# Patient Record
Sex: MALE | Race: WHITE | NOT HISPANIC OR LATINO | Employment: FULL TIME | URBAN - METROPOLITAN AREA
[De-identification: names, ages, dates, MRNs, and addresses within clinical notes are randomized per-mention and may not be internally consistent; named-entity substitution may affect disease eponyms.]

---

## 2020-04-24 ENCOUNTER — TRANSCRIBE ORDERS (OUTPATIENT)
Dept: CARDIOLOGY | Facility: CLINIC | Age: 45
End: 2020-04-24

## 2020-04-24 ENCOUNTER — OFFICE VISIT (OUTPATIENT)
Dept: CARDIOLOGY | Facility: CLINIC | Age: 45
End: 2020-04-24

## 2020-04-24 ENCOUNTER — HOSPITAL ENCOUNTER (OUTPATIENT)
Dept: CARDIOLOGY | Facility: HOSPITAL | Age: 45
Discharge: HOME | End: 2020-04-24
Admitting: INTERNAL MEDICINE

## 2020-04-24 VITALS
HEART RATE: 98 BPM | BODY MASS INDEX: 39.56 KG/M2 | DIASTOLIC BLOOD PRESSURE: 88 MMHG | WEIGHT: 282.6 LBS | SYSTOLIC BLOOD PRESSURE: 158 MMHG | HEIGHT: 71 IN | OXYGEN SATURATION: 98 %

## 2020-04-24 DIAGNOSIS — R06.02 SHORTNESS OF BREATH: ICD-10-CM

## 2020-04-24 DIAGNOSIS — Z13.6 SCREENING FOR CARDIOVASCULAR CONDITION: Primary | ICD-10-CM

## 2020-04-24 DIAGNOSIS — R07.2 PRECORDIAL PAIN: Primary | ICD-10-CM

## 2020-04-24 DIAGNOSIS — Z01.810 PREPROCEDURAL CARDIOVASCULAR EXAMINATION: ICD-10-CM

## 2020-04-24 DIAGNOSIS — R07.2 PRECORDIAL PAIN: ICD-10-CM

## 2020-04-24 LAB
AORTIC ROOT ANNULUS: 2.2 CM
ASCENDING AORTA: 3.3 CM
BH CV ECHO MEAS - ACS: 1.9 CM
BH CV ECHO MEAS - AO ROOT AREA (BSA CORRECTED): 1.2
BH CV ECHO MEAS - AO ROOT AREA: 6.7 CM^2
BH CV ECHO MEAS - AO ROOT DIAM: 2.9 CM
BH CV ECHO MEAS - ASC AORTA: 3.3 CM
BH CV ECHO MEAS - BSA(HAYCOCK): 2.6 M^2
BH CV ECHO MEAS - BSA: 2.4 M^2
BH CV ECHO MEAS - BZI_BMI: 40.2 KILOGRAMS/M^2
BH CV ECHO MEAS - BZI_METRIC_HEIGHT: 177.8 CM
BH CV ECHO MEAS - BZI_METRIC_WEIGHT: 127 KG
BH CV ECHO MEAS - EDV(MOD-SP2): 87 ML
BH CV ECHO MEAS - EDV(MOD-SP4): 94 ML
BH CV ECHO MEAS - EDV(TEICH): 65.5 ML
BH CV ECHO MEAS - EF(CUBED): 60.3 %
BH CV ECHO MEAS - EF(MOD-BP): 62 %
BH CV ECHO MEAS - EF(MOD-SP2): 66.7 %
BH CV ECHO MEAS - EF(MOD-SP4): 55.3 %
BH CV ECHO MEAS - EF(TEICH): 52.5 %
BH CV ECHO MEAS - ESV(MOD-SP2): 29 ML
BH CV ECHO MEAS - ESV(MOD-SP4): 42 ML
BH CV ECHO MEAS - ESV(TEICH): 31.1 ML
BH CV ECHO MEAS - FS: 26.5 %
BH CV ECHO MEAS - IVS/LVPW: 0.94
BH CV ECHO MEAS - IVSD: 1.1 CM
BH CV ECHO MEAS - LAT PEAK E' VEL: 10 CM/SEC
BH CV ECHO MEAS - LV DIASTOLIC VOL/BSA (35-75): 39 ML/M^2
BH CV ECHO MEAS - LV MASS(C)D: 152.6 GRAMS
BH CV ECHO MEAS - LV MASS(C)DI: 63.4 GRAMS/M^2
BH CV ECHO MEAS - LV MAX PG: 13.4 MMHG
BH CV ECHO MEAS - LV SYSTOLIC VOL/BSA (12-30): 17.4 ML/M^2
BH CV ECHO MEAS - LV V1 MAX: 183.2 CM/SEC
BH CV ECHO MEAS - LVIDD: 3.9 CM
BH CV ECHO MEAS - LVIDS: 2.9 CM
BH CV ECHO MEAS - LVLD AP2: 8.2 CM
BH CV ECHO MEAS - LVLD AP4: 8.4 CM
BH CV ECHO MEAS - LVLS AP2: 7.1 CM
BH CV ECHO MEAS - LVLS AP4: 7.3 CM
BH CV ECHO MEAS - LVPWD: 1.2 CM
BH CV ECHO MEAS - MED PEAK E' VEL: 8 CM/SEC
BH CV ECHO MEAS - MV A DUR: 0.1 SEC
BH CV ECHO MEAS - MV A MAX VEL: 64.9 CM/SEC
BH CV ECHO MEAS - MV DEC SLOPE: 680.8 CM/SEC^2
BH CV ECHO MEAS - MV DEC TIME: 0.14 SEC
BH CV ECHO MEAS - MV E MAX VEL: 101 CM/SEC
BH CV ECHO MEAS - MV E/A: 1.6
BH CV ECHO MEAS - MV P1/2T MAX VEL: 90.2 CM/SEC
BH CV ECHO MEAS - MV P1/2T: 38.8 MSEC
BH CV ECHO MEAS - MVA P1/2T LCG: 2.4 CM^2
BH CV ECHO MEAS - MVA(P1/2T): 5.7 CM^2
BH CV ECHO MEAS - PULM A REVS DUR: 0.1 SEC
BH CV ECHO MEAS - PULM A REVS VEL: 26.2 CM/SEC
BH CV ECHO MEAS - PULM DIAS VEL: 64.3 CM/SEC
BH CV ECHO MEAS - PULM S/D: 0.9
BH CV ECHO MEAS - PULM SYS VEL: 58.1 CM/SEC
BH CV ECHO MEAS - RAP SYSTOLE: 3 MMHG
BH CV ECHO MEAS - RVSP: 24.8 MMHG
BH CV ECHO MEAS - SI(CUBED): 14.7 ML/M^2
BH CV ECHO MEAS - SI(MOD-SP2): 24.1 ML/M^2
BH CV ECHO MEAS - SI(MOD-SP4): 21.6 ML/M^2
BH CV ECHO MEAS - SI(TEICH): 14.3 ML/M^2
BH CV ECHO MEAS - SV(CUBED): 35.5 ML
BH CV ECHO MEAS - SV(MOD-SP2): 58 ML
BH CV ECHO MEAS - SV(MOD-SP4): 52 ML
BH CV ECHO MEAS - SV(TEICH): 34.4 ML
BH CV ECHO MEAS - TAPSE (>1.6): 2.5 CM2
BH CV ECHO MEAS - TR MAX VEL: 233.5 CM/SEC
BH CV ECHO MEASUREMENTS AVERAGE E/E' RATIO: 11.22
BH CV STRESS BP STAGE 1: NORMAL
BH CV STRESS BP STAGE 2: NORMAL
BH CV STRESS BP STAGE 3: NORMAL
BH CV STRESS DURATION MIN STAGE 1: 3
BH CV STRESS DURATION MIN STAGE 2: 3
BH CV STRESS DURATION MIN STAGE 3: 3
BH CV STRESS DURATION SEC STAGE 1: 0
BH CV STRESS DURATION SEC STAGE 2: 0
BH CV STRESS DURATION SEC STAGE 3: 0
BH CV STRESS ECHO POST STRESS EJECTION FRACTION EF: 74 %
BH CV STRESS GRADE STAGE 1: 10
BH CV STRESS GRADE STAGE 2: 12
BH CV STRESS GRADE STAGE 3: 14
BH CV STRESS HR STAGE 1: 122
BH CV STRESS HR STAGE 2: 145
BH CV STRESS HR STAGE 3: 167
BH CV STRESS METS STAGE 1: 5
BH CV STRESS METS STAGE 2: 7.5
BH CV STRESS METS STAGE 3: 10
BH CV STRESS PROTOCOL 1: NORMAL
BH CV STRESS SPEED STAGE 1: 1.7
BH CV STRESS SPEED STAGE 2: 2.5
BH CV STRESS SPEED STAGE 3: 3.4
BH CV STRESS STAGE 1: 1
BH CV STRESS STAGE 2: 2
BH CV STRESS STAGE 3: 3
BH CV VAS BP RIGHT ARM: NORMAL MMHG
BH CV XLRA - RV BASE: 2.8 CM
BH CV XLRA - RV LENGTH: 7.9 CM
BH CV XLRA - RV MID: 2.8 CM
BH CV XLRA - TDI S': 13 CM/SEC
LEFT ATRIUM VOLUME INDEX: 20 ML/M2
MAXIMAL PREDICTED HEART RATE: 177 BPM
PERCENT MAX PREDICTED HR: 94.35 %
SINUS: 3 CM
STJ: 2.9 CM
STRESS BASELINE BP: NORMAL MMHG
STRESS BASELINE HR: 85 BPM
STRESS O2 SAT REST: 98 %
STRESS PERCENT HR: 111 %
STRESS POST ESTIMATED WORKLOAD: 10 METS
STRESS POST EXERCISE DUR MIN: 9 MIN
STRESS POST EXERCISE DUR SEC: 0 SEC
STRESS POST PEAK BP: NORMAL MMHG
STRESS POST PEAK HR: 167 BPM
STRESS TARGET HR: 150 BPM

## 2020-04-24 PROCEDURE — 99244 OFF/OP CNSLTJ NEW/EST MOD 40: CPT | Performed by: INTERNAL MEDICINE

## 2020-04-24 PROCEDURE — 93325 DOPPLER ECHO COLOR FLOW MAPG: CPT

## 2020-04-24 PROCEDURE — 93320 DOPPLER ECHO COMPLETE: CPT

## 2020-04-24 PROCEDURE — 93320 DOPPLER ECHO COMPLETE: CPT | Performed by: INTERNAL MEDICINE

## 2020-04-24 PROCEDURE — 25010000002 PERFLUTREN (DEFINITY) 8.476 MG IN SODIUM CHLORIDE (PF) 0.9 % 10 ML INJECTION: Performed by: INTERNAL MEDICINE

## 2020-04-24 PROCEDURE — 93350 STRESS TTE ONLY: CPT

## 2020-04-24 PROCEDURE — 93350 STRESS TTE ONLY: CPT | Performed by: INTERNAL MEDICINE

## 2020-04-24 PROCEDURE — 93017 CV STRESS TEST TRACING ONLY: CPT

## 2020-04-24 PROCEDURE — 93325 DOPPLER ECHO COLOR FLOW MAPG: CPT | Performed by: INTERNAL MEDICINE

## 2020-04-24 PROCEDURE — 93016 CV STRESS TEST SUPVJ ONLY: CPT | Performed by: INTERNAL MEDICINE

## 2020-04-24 PROCEDURE — 93018 CV STRESS TEST I&R ONLY: CPT | Performed by: INTERNAL MEDICINE

## 2020-04-24 PROCEDURE — 93352 ADMIN ECG CONTRAST AGENT: CPT | Performed by: INTERNAL MEDICINE

## 2020-04-24 RX ADMIN — PERFLUTREN 3 ML: 6.52 INJECTION, SUSPENSION INTRAVENOUS at 14:38

## 2020-04-24 NOTE — H&P (VIEW-ONLY)
Subjective:     Encounter Date:04/24/2020      Patient ID: Blaek Dennis is a 45 y.o. male.    Dear Herminia thank you for referring this patient for consultation of recurrent exertional chest pressure and shortness of breath.  Chief Complaint:  Shortness of Breath   This is a new problem. The current episode started 1 to 4 weeks ago. The problem has been gradually worsening. Associated symptoms include chest pain.   Chest Pain    Associated symptoms include shortness of breath.       45-year-old gentleman who presents today for evaluation.  He said back in March she started having an upper respiratory infection.  He said he had a cough chest pain fever.  He has had a persistent with a sore throat for about a month.  The cough ultimately resolved but he continues to still have chest pain as well as shortness of breath with exertion.  He said he had an episode at work where he started having symptoms.  He went and had his blood pressure checked which was 160/112 he was not sure if that was correct because it was so unusually high so he went to his primary care physician and they got 130/80.  He continues to have episodes of exertional shortness of breath and tightness in his chest.  With that he was seen today for further evaluation.    Review of Systems   Cardiovascular: Positive for chest pain.   Respiratory: Positive for shortness of breath and snoring.    Musculoskeletal: Positive for joint pain and joint swelling.   All other systems reviewed and are negative.      Procedures       Objective:     Physical Exam   Constitutional: He is oriented to person, place, and time. He appears well-developed.   HENT:   Head: Normocephalic.   Eyes: Conjunctivae are normal.   Neck: Normal range of motion.   Cardiovascular: Normal rate, regular rhythm and normal heart sounds.   Pulmonary/Chest: Breath sounds normal.   Abdominal: Soft. Bowel sounds are normal.   Musculoskeletal: Normal range of motion. He exhibits no edema.    Neurological: He is alert and oriented to person, place, and time.   Skin: Skin is warm and dry.   Psychiatric: He has a normal mood and affect. His behavior is normal.   Vitals reviewed.      Lab Review:       Assessment:          Diagnosis Plan   1. Precordial pain  Adult Stress Echo W/ Cont or Stress Agent if Necessary Per Protocol    Case Request Cath Lab: Coronary angiography, Left heart cath, Left ventriculography   2. Shortness of breath  Adult Stress Echo W/ Cont or Stress Agent if Necessary Per Protocol    Case Request Cath Lab: Coronary angiography, Left heart cath, Left ventriculography          Plan:       1.  Chest tightness and shortness of breath with exertion.  It is actually coming concerning his history.  He did have his illness back in March I wonder if he had COVID then but that was really not spoke of very much.  In either case although symptoms have resolved but he continues to have chest discomfort with exertion and shortness of breath.  In light of that I did a stress echocardiogram on him.  His ECG changed with 1 mm ST depression in his anterior apical wall became hypokinetic at peak exercise.  With that I would proceed with a cardiac catheterization risk and benefits of the cath were explained and were going to set him up for Monday.

## 2020-04-24 NOTE — PROGRESS NOTES
Subjective:     Encounter Date:04/24/2020      Patient ID: Blake Dennis is a 45 y.o. male.    Dear Herminia thank you for referring this patient for consultation of recurrent exertional chest pressure and shortness of breath.  Chief Complaint:  Shortness of Breath   This is a new problem. The current episode started 1 to 4 weeks ago. The problem has been gradually worsening. Associated symptoms include chest pain.   Chest Pain    Associated symptoms include shortness of breath.       45-year-old gentleman who presents today for evaluation.  He said back in March she started having an upper respiratory infection.  He said he had a cough chest pain fever.  He has had a persistent with a sore throat for about a month.  The cough ultimately resolved but he continues to still have chest pain as well as shortness of breath with exertion.  He said he had an episode at work where he started having symptoms.  He went and had his blood pressure checked which was 160/112 he was not sure if that was correct because it was so unusually high so he went to his primary care physician and they got 130/80.  He continues to have episodes of exertional shortness of breath and tightness in his chest.  With that he was seen today for further evaluation.    Review of Systems   Cardiovascular: Positive for chest pain.   Respiratory: Positive for shortness of breath and snoring.    Musculoskeletal: Positive for joint pain and joint swelling.   All other systems reviewed and are negative.      Procedures       Objective:     Physical Exam   Constitutional: He is oriented to person, place, and time. He appears well-developed.   HENT:   Head: Normocephalic.   Eyes: Conjunctivae are normal.   Neck: Normal range of motion.   Cardiovascular: Normal rate, regular rhythm and normal heart sounds.   Pulmonary/Chest: Breath sounds normal.   Abdominal: Soft. Bowel sounds are normal.   Musculoskeletal: Normal range of motion. He exhibits no edema.    Neurological: He is alert and oriented to person, place, and time.   Skin: Skin is warm and dry.   Psychiatric: He has a normal mood and affect. His behavior is normal.   Vitals reviewed.      Lab Review:       Assessment:          Diagnosis Plan   1. Precordial pain  Adult Stress Echo W/ Cont or Stress Agent if Necessary Per Protocol    Case Request Cath Lab: Coronary angiography, Left heart cath, Left ventriculography   2. Shortness of breath  Adult Stress Echo W/ Cont or Stress Agent if Necessary Per Protocol    Case Request Cath Lab: Coronary angiography, Left heart cath, Left ventriculography          Plan:       1.  Chest tightness and shortness of breath with exertion.  It is actually coming concerning his history.  He did have his illness back in March I wonder if he had COVID then but that was really not spoke of very much.  In either case although symptoms have resolved but he continues to have chest discomfort with exertion and shortness of breath.  In light of that I did a stress echocardiogram on him.  His ECG changed with 1 mm ST depression in his anterior apical wall became hypokinetic at peak exercise.  With that I would proceed with a cardiac catheterization risk and benefits of the cath were explained and were going to set him up for Monday.

## 2020-04-26 PROBLEM — R07.2 PRECORDIAL PAIN: Status: ACTIVE | Noted: 2020-04-26

## 2020-04-26 PROBLEM — R06.02 SHORTNESS OF BREATH: Status: ACTIVE | Noted: 2020-04-26

## 2020-04-27 ENCOUNTER — HOSPITAL ENCOUNTER (OUTPATIENT)
Facility: HOSPITAL | Age: 45
Setting detail: HOSPITAL OUTPATIENT SURGERY
Discharge: HOME | End: 2020-04-27
Attending: INTERNAL MEDICINE | Admitting: INTERNAL MEDICINE

## 2020-04-27 VITALS
SYSTOLIC BLOOD PRESSURE: 148 MMHG | DIASTOLIC BLOOD PRESSURE: 98 MMHG | RESPIRATION RATE: 18 BRPM | OXYGEN SATURATION: 98 % | TEMPERATURE: 98 F | HEART RATE: 59 BPM

## 2020-04-27 DIAGNOSIS — R07.2 PRECORDIAL PAIN: ICD-10-CM

## 2020-04-27 DIAGNOSIS — R06.02 SHORTNESS OF BREATH: ICD-10-CM

## 2020-04-27 DIAGNOSIS — I25.118 CORONARY ARTERY DISEASE OF NATIVE ARTERY OF NATIVE HEART WITH STABLE ANGINA PECTORIS (HCC): Primary | ICD-10-CM

## 2020-04-27 LAB
ANION GAP SERPL CALCULATED.3IONS-SCNC: 12.4 MMOL/L (ref 5–15)
BASOPHILS # BLD AUTO: 0.03 10*3/MM3 (ref 0–0.2)
BASOPHILS NFR BLD AUTO: 0.4 % (ref 0–1.5)
BUN BLD-MCNC: 11 MG/DL (ref 6–20)
BUN/CREAT SERPL: 11.5 (ref 7–25)
CALCIUM SPEC-SCNC: 9.6 MG/DL (ref 8.6–10.5)
CHLORIDE SERPL-SCNC: 101 MMOL/L (ref 98–107)
CO2 SERPL-SCNC: 27.6 MMOL/L (ref 22–29)
CREAT BLD-MCNC: 0.96 MG/DL (ref 0.76–1.27)
DEPRECATED RDW RBC AUTO: 43 FL (ref 37–54)
EOSINOPHIL # BLD AUTO: 0.07 10*3/MM3 (ref 0–0.4)
EOSINOPHIL NFR BLD AUTO: 0.9 % (ref 0.3–6.2)
ERYTHROCYTE [DISTWIDTH] IN BLOOD BY AUTOMATED COUNT: 13.7 % (ref 12.3–15.4)
GFR SERPL CREATININE-BSD FRML MDRD: 85 ML/MIN/1.73
GLUCOSE BLD-MCNC: 97 MG/DL (ref 65–99)
HCT VFR BLD AUTO: 46.6 % (ref 37.5–51)
HGB BLD-MCNC: 15.8 G/DL (ref 13–17.7)
IMM GRANULOCYTES # BLD AUTO: 0.05 10*3/MM3 (ref 0–0.05)
IMM GRANULOCYTES NFR BLD AUTO: 0.6 % (ref 0–0.5)
LYMPHOCYTES # BLD AUTO: 1.59 10*3/MM3 (ref 0.7–3.1)
LYMPHOCYTES NFR BLD AUTO: 19.7 % (ref 19.6–45.3)
MCH RBC QN AUTO: 29.2 PG (ref 26.6–33)
MCHC RBC AUTO-ENTMCNC: 33.9 G/DL (ref 31.5–35.7)
MCV RBC AUTO: 86 FL (ref 79–97)
MONOCYTES # BLD AUTO: 0.69 10*3/MM3 (ref 0.1–0.9)
MONOCYTES NFR BLD AUTO: 8.5 % (ref 5–12)
NEUTROPHILS # BLD AUTO: 5.66 10*3/MM3 (ref 1.7–7)
NEUTROPHILS NFR BLD AUTO: 69.9 % (ref 42.7–76)
NRBC BLD AUTO-RTO: 0 /100 WBC (ref 0–0.2)
PLATELET # BLD AUTO: 284 10*3/MM3 (ref 140–450)
PMV BLD AUTO: 9.9 FL (ref 6–12)
POTASSIUM BLD-SCNC: 4 MMOL/L (ref 3.5–5.2)
RBC # BLD AUTO: 5.42 10*6/MM3 (ref 4.14–5.8)
SODIUM BLD-SCNC: 141 MMOL/L (ref 136–145)
WBC NRBC COR # BLD: 8.09 10*3/MM3 (ref 3.4–10.8)

## 2020-04-27 PROCEDURE — 93458 L HRT ARTERY/VENTRICLE ANGIO: CPT | Performed by: INTERNAL MEDICINE

## 2020-04-27 PROCEDURE — C1769 GUIDE WIRE: HCPCS | Performed by: INTERNAL MEDICINE

## 2020-04-27 PROCEDURE — C1874 STENT, COATED/COV W/DEL SYS: HCPCS | Performed by: INTERNAL MEDICINE

## 2020-04-27 PROCEDURE — C1887 CATHETER, GUIDING: HCPCS | Performed by: INTERNAL MEDICINE

## 2020-04-27 PROCEDURE — C1725 CATH, TRANSLUMIN NON-LASER: HCPCS | Performed by: INTERNAL MEDICINE

## 2020-04-27 PROCEDURE — 85025 COMPLETE CBC W/AUTO DIFF WBC: CPT | Performed by: INTERNAL MEDICINE

## 2020-04-27 PROCEDURE — C9600 PERC DRUG-EL COR STENT SING: HCPCS | Performed by: INTERNAL MEDICINE

## 2020-04-27 PROCEDURE — 99153 MOD SED SAME PHYS/QHP EA: CPT | Performed by: INTERNAL MEDICINE

## 2020-04-27 PROCEDURE — 0 IOPAMIDOL PER 1 ML: Performed by: INTERNAL MEDICINE

## 2020-04-27 PROCEDURE — 25010000002 HEPARIN (PORCINE) PER 1000 UNITS: Performed by: INTERNAL MEDICINE

## 2020-04-27 PROCEDURE — 25010000002 MIDAZOLAM PER 1 MG: Performed by: INTERNAL MEDICINE

## 2020-04-27 PROCEDURE — C1894 INTRO/SHEATH, NON-LASER: HCPCS | Performed by: INTERNAL MEDICINE

## 2020-04-27 PROCEDURE — 80048 BASIC METABOLIC PNL TOTAL CA: CPT | Performed by: INTERNAL MEDICINE

## 2020-04-27 PROCEDURE — 92928 PRQ TCAT PLMT NTRAC ST 1 LES: CPT | Performed by: INTERNAL MEDICINE

## 2020-04-27 PROCEDURE — 99152 MOD SED SAME PHYS/QHP 5/>YRS: CPT | Performed by: INTERNAL MEDICINE

## 2020-04-27 PROCEDURE — 93010 ELECTROCARDIOGRAM REPORT: CPT | Performed by: INTERNAL MEDICINE

## 2020-04-27 PROCEDURE — 85347 COAGULATION TIME ACTIVATED: CPT

## 2020-04-27 PROCEDURE — 25010000002 FENTANYL CITRATE (PF) 100 MCG/2ML SOLUTION: Performed by: INTERNAL MEDICINE

## 2020-04-27 PROCEDURE — 93005 ELECTROCARDIOGRAM TRACING: CPT | Performed by: INTERNAL MEDICINE

## 2020-04-27 DEVICE — XIENCE SIERRA™ EVEROLIMUS ELUTING CORONARY STENT SYSTEM 2.50 MM X 23 MM / RAPID-EXCHANGE
Type: IMPLANTABLE DEVICE | Status: FUNCTIONAL
Brand: XIENCE SIERRA™

## 2020-04-27 DEVICE — XIENCE SIERRA™ EVEROLIMUS ELUTING CORONARY STENT SYSTEM 3.00 MM X 15 MM / RAPID-EXCHANGE
Type: IMPLANTABLE DEVICE | Status: FUNCTIONAL
Brand: XIENCE SIERRA™

## 2020-04-27 RX ORDER — ACETAMINOPHEN 325 MG/1
650 TABLET ORAL EVERY 4 HOURS PRN
Status: DISCONTINUED | OUTPATIENT
Start: 2020-04-27 | End: 2020-04-27 | Stop reason: HOSPADM

## 2020-04-27 RX ORDER — LIDOCAINE HYDROCHLORIDE 20 MG/ML
INJECTION, SOLUTION INFILTRATION; PERINEURAL AS NEEDED
Status: DISCONTINUED | OUTPATIENT
Start: 2020-04-27 | End: 2020-04-27 | Stop reason: HOSPADM

## 2020-04-27 RX ORDER — SODIUM CHLORIDE 9 MG/ML
75 INJECTION, SOLUTION INTRAVENOUS CONTINUOUS
Status: DISCONTINUED | OUTPATIENT
Start: 2020-04-27 | End: 2020-04-27 | Stop reason: HOSPADM

## 2020-04-27 RX ORDER — SODIUM CHLORIDE 9 MG/ML
100 INJECTION, SOLUTION INTRAVENOUS CONTINUOUS
Status: DISCONTINUED | OUTPATIENT
Start: 2020-04-27 | End: 2020-04-27 | Stop reason: HOSPADM

## 2020-04-27 RX ORDER — LIDOCAINE HYDROCHLORIDE 10 MG/ML
0.1 INJECTION, SOLUTION EPIDURAL; INFILTRATION; INTRACAUDAL; PERINEURAL ONCE AS NEEDED
Status: DISCONTINUED | OUTPATIENT
Start: 2020-04-27 | End: 2020-04-27 | Stop reason: HOSPADM

## 2020-04-27 RX ORDER — ASPIRIN 325 MG
TABLET ORAL AS NEEDED
Status: DISCONTINUED | OUTPATIENT
Start: 2020-04-27 | End: 2020-04-27 | Stop reason: HOSPADM

## 2020-04-27 RX ORDER — SODIUM CHLORIDE 0.9 % (FLUSH) 0.9 %
3 SYRINGE (ML) INJECTION EVERY 12 HOURS SCHEDULED
Status: DISCONTINUED | OUTPATIENT
Start: 2020-04-27 | End: 2020-04-27 | Stop reason: HOSPADM

## 2020-04-27 RX ORDER — NITROGLYCERIN 0.4 MG/1
0.4 TABLET SUBLINGUAL
Qty: 25 TABLET | Refills: 3 | Status: SHIPPED | OUTPATIENT
Start: 2020-04-27 | End: 2020-04-27 | Stop reason: SDUPTHER

## 2020-04-27 RX ORDER — CLOPIDOGREL BISULFATE 75 MG/1
75 TABLET ORAL DAILY
Status: DISCONTINUED | OUTPATIENT
Start: 2020-04-28 | End: 2020-04-27 | Stop reason: HOSPADM

## 2020-04-27 RX ORDER — ASPIRIN 81 MG/1
81 TABLET, CHEWABLE ORAL DAILY
Qty: 90 TABLET | Refills: 3 | Status: SHIPPED | OUTPATIENT
Start: 2020-04-27 | End: 2020-04-27 | Stop reason: SDUPTHER

## 2020-04-27 RX ORDER — SODIUM CHLORIDE 0.9 % (FLUSH) 0.9 %
10 SYRINGE (ML) INJECTION AS NEEDED
Status: DISCONTINUED | OUTPATIENT
Start: 2020-04-27 | End: 2020-04-27 | Stop reason: HOSPADM

## 2020-04-27 RX ORDER — CLOPIDOGREL BISULFATE 75 MG/1
75 TABLET ORAL DAILY
Qty: 90 TABLET | Refills: 3 | Status: SHIPPED | OUTPATIENT
Start: 2020-04-27 | End: 2020-05-14 | Stop reason: SDUPTHER

## 2020-04-27 RX ORDER — ASPIRIN 81 MG/1
81 TABLET, CHEWABLE ORAL DAILY
Qty: 90 TABLET | Refills: 3 | Status: SHIPPED | OUTPATIENT
Start: 2020-04-27 | End: 2020-05-14 | Stop reason: SDUPTHER

## 2020-04-27 RX ORDER — HEPARIN SODIUM 1000 [USP'U]/ML
INJECTION, SOLUTION INTRAVENOUS; SUBCUTANEOUS AS NEEDED
Status: DISCONTINUED | OUTPATIENT
Start: 2020-04-27 | End: 2020-04-27 | Stop reason: HOSPADM

## 2020-04-27 RX ORDER — ATORVASTATIN CALCIUM 40 MG/1
40 TABLET, FILM COATED ORAL DAILY
Qty: 90 TABLET | Refills: 3 | Status: SHIPPED | OUTPATIENT
Start: 2020-04-27 | End: 2020-05-14 | Stop reason: SDUPTHER

## 2020-04-27 RX ORDER — ASPIRIN 81 MG/1
81 TABLET, CHEWABLE ORAL DAILY
Status: DISCONTINUED | OUTPATIENT
Start: 2020-04-28 | End: 2020-04-27 | Stop reason: HOSPADM

## 2020-04-27 RX ORDER — NITROGLYCERIN 0.4 MG/1
0.4 TABLET SUBLINGUAL
Qty: 25 TABLET | Refills: 3 | Status: SHIPPED | OUTPATIENT
Start: 2020-04-27 | End: 2020-05-14 | Stop reason: SDUPTHER

## 2020-04-27 RX ORDER — ATORVASTATIN CALCIUM 40 MG/1
40 TABLET, FILM COATED ORAL DAILY
Qty: 90 TABLET | Refills: 3 | Status: SHIPPED | OUTPATIENT
Start: 2020-04-27 | End: 2020-04-27 | Stop reason: SDUPTHER

## 2020-04-27 RX ORDER — CLOPIDOGREL BISULFATE 75 MG/1
75 TABLET ORAL DAILY
Qty: 90 TABLET | Refills: 3 | Status: SHIPPED | OUTPATIENT
Start: 2020-04-27 | End: 2020-04-27 | Stop reason: SDUPTHER

## 2020-04-27 RX ORDER — CLOPIDOGREL BISULFATE 75 MG/1
TABLET ORAL AS NEEDED
Status: DISCONTINUED | OUTPATIENT
Start: 2020-04-27 | End: 2020-04-27 | Stop reason: HOSPADM

## 2020-04-27 RX ORDER — FENTANYL CITRATE 50 UG/ML
INJECTION, SOLUTION INTRAMUSCULAR; INTRAVENOUS AS NEEDED
Status: DISCONTINUED | OUTPATIENT
Start: 2020-04-27 | End: 2020-04-27 | Stop reason: HOSPADM

## 2020-04-27 RX ORDER — ATORVASTATIN CALCIUM 20 MG/1
40 TABLET, FILM COATED ORAL NIGHTLY
Status: DISCONTINUED | OUTPATIENT
Start: 2020-04-27 | End: 2020-04-27 | Stop reason: HOSPADM

## 2020-04-27 RX ORDER — MIDAZOLAM HYDROCHLORIDE 1 MG/ML
INJECTION INTRAMUSCULAR; INTRAVENOUS AS NEEDED
Status: DISCONTINUED | OUTPATIENT
Start: 2020-04-27 | End: 2020-04-27 | Stop reason: HOSPADM

## 2020-04-27 RX ADMIN — SODIUM CHLORIDE 75 ML/HR: 9 INJECTION, SOLUTION INTRAVENOUS at 08:06

## 2020-04-27 RX ADMIN — METOPROLOL TARTRATE 25 MG: 25 TABLET ORAL at 11:31

## 2020-04-27 NOTE — INTERVAL H&P NOTE
H&P reviewed. The patient was examined and there are no changes to the H&P.   Stress echo was abnormal with anterior wall hypokinesis

## 2020-04-27 NOTE — DISCHARGE INSTRUCTIONS
You have had your doses of aspirin, plavix, and metoprolol today.      [facility]  [address] [place] [id number]    Coronary Angiogram with Stent (Radial Approach) After Care    Refer to this sheet in the next few weeks. These instructions provide you with information on caring for yourself after your procedure. Your health care provider may also give you more specific instructions. Your treatment has been planned according to current medical practices, but problems sometimes occur. Call your health care provider if you have any problems or questions after your procedure.       Home Care Instructions:  · You may shower the day after the procedure. Remove the bandage (dressing) and gently wash the site with plain soap and water. Gently pat the site dry. You may apply a band aid daily for 2 days if desired.    · Do not apply powder or lotion to the site.  · Do not submerge the affected site in water for 3 to 5 days or until the site is completely healed.   · Do not flex or bend the affected arm for 24 hours.  · Do not lift, push or pull anything over 10 pounds for 2 days after your procedure.  · Do not operate machinery or power tools for 24 hours.  · Inspect the site at least twice daily. You may notice some bruising at the site and it may be tender for 1 to 2 weeks.     · Increase your fluid intake for the next 2 days.    · Keep arm elevated for 24 hours.  For the remainder of the day, keep your arm in the “Pledge of Allegiance” position when up and about.    · Limit your activity for the next 48 hours and avoid strenuous activity as directed by your physician.   · Cardiac Rehab may or may not be ordered.  Please consult with your physician  · You may drive 24 hours after the procedure unless otherwise instructed by your caregiver.  · A responsible adult should be with you for the first 24 hours after you arrive home.   · Do not make any important legal decisions or sign legal papers for 24  hours. Do not drink alcohol for 24 hours.    · Take medicines only as directed by your health care provider.  Blood thinners may be prescribed after your procedure to improve blood flow through the stent.    · Metformin or any medications containing Metformin should not be taken for 48 hours after your procedure.    · Eat a heart-healthy diet. This should include plenty of fresh fruits and vegetables. Meat should be lean cuts. Avoid the following types of food:    ¨ Food that is high in salt.    ¨ Canned or highly processed food.    ¨ Food that is high in saturated fat or sugar.    ¨ Fried food.    · Make any other lifestyle changes recommended by your health care provider. This may include:    ¨ Not using any tobacco products including cigarettes, chewing tobacco, or electronic cigarettes.   ¨ Managing your weight.    ¨ Getting regular exercise.    ¨ Managing your blood pressure.    ¨ Limiting your alcohol intake.    ¨ Managing other health problems, such as diabetes.    · If you need an MRI after your heart stent was placed, be sure to tell the health care provider who orders the MRI that you have a heart stent.    · Keep all follow-up visits as directed by your health care provider.    ·   Call Your Doctor If:  · You have unusual pain at the radial/ulnar (wrist) site.  · You have redness, warmth, swelling, or pain at the radial/ulnar (wrist) site.  · You have drainage (other than a small amount of blood on the dressing).  · `You have chills or a fever > 101.  · Your arm becomes pale or dark, cool, tingly, or numb.  · You develop chest pain, shortness of breath, feel faint or pass out.    · You have heavy bleeding from the site, hold pressure on the site for 20 minutes.  If the bleeding stops, apply a fresh bandage and call your cardiologist.  However, if you continue to have bleeding, call 911.        Make Sure You:   · Understand these instructions.  · Will watch your condition.  · Will get help right away if  you are not doing well or get worse.

## 2020-04-28 LAB
ACT BLD: 257 SECONDS (ref 82–152)
ACT BLD: 268 SECONDS (ref 82–152)

## 2020-05-06 ENCOUNTER — TELEMEDICINE (OUTPATIENT)
Dept: CARDIOLOGY | Facility: CLINIC | Age: 45
End: 2020-05-06

## 2020-05-06 VITALS — HEIGHT: 71 IN | BODY MASS INDEX: 39.41 KG/M2

## 2020-05-06 DIAGNOSIS — I25.10 CORONARY ARTERY DISEASE INVOLVING NATIVE CORONARY ARTERY OF NATIVE HEART WITHOUT ANGINA PECTORIS: Primary | ICD-10-CM

## 2020-05-06 PROCEDURE — 99214 OFFICE O/P EST MOD 30 MIN: CPT | Performed by: NURSE PRACTITIONER

## 2020-05-06 NOTE — PROGRESS NOTES
"    [facility] CARDIOLOGY  [address]  [facility] [address]  Phone: [phone]      Patient Name: Blake Dennis  :1975  Age: 45 y.o.  Primary Cardiologist: Bo Torres MD  Encounter Provider:  NIKI Pack      Chief Complaint:   Chief Complaint   Patient presents with   • Follow-up     video         HPI  Blake Dennis is a 45 y.o. male who presents today via video visit secondary to COVID pandemic. Patient presents today for hospital follow-up.     Pt has a  history significant for CAD, HTN.    Patient was recently seen by Dr. Torres for chest pain and shortness of breath.  Patient had stress echocardiogram performed in office which revealed evidence of myocardial ischemia in the anterior wall with ST segment depression of 1 mm during stress.  Patient was subsequently taken to the cardiac catheterization lab on 2020 which revealed 70% stenosis in the mid LAD as well as an 80% lesion in the mid LAD.  Patient was successfully treated with TANNER and PCI.  He was started on GDMT.    The following portions of the patient's history were reviewed and updated as appropriate: allergies, current medications, past family history, past medical history, past social history, past surgical history and problem list.      Review of Systems   Constitution: Negative for malaise/fatigue.   Cardiovascular: Negative for chest pain and leg swelling.   Respiratory: Negative for shortness of breath.    Neurological: Negative for light-headedness.   All other systems reviewed and are negative.      OBJECTIVE:     Vitals:    20 1440   Height: 180.3 cm (71\")     Body mass index is 39.41 kg/m².    Physical Exam   Constitutional: He is oriented to person, place, and time. He appears well-developed.   HENT:   Head: Normocephalic and atraumatic.   Eyes: Conjunctivae are normal.   Pulmonary/Chest: No respiratory distress.   Neurological: He is alert and oriented to " person, place, and time. GCS eye subscore is 4. GCS verbal subscore is 5. GCS motor subscore is 6.   Skin:   Right radial cath site well healed without erythema or ecchymosis,  good motor function of hand.     Psychiatric: He has a normal mood and affect. His speech is normal and behavior is normal. Judgment and thought content normal. Cognition and memory are normal.         Procedures    Cardiac Procedures:  1. Stress echocardiogram 4/24/2020.  Myocardial ischemia noted in the anterior wall.  2. Cardiac catheterization 4/27/2020.  Single-vessel CAD with 70% and 80% stenosis in the mid LAD.  Patient with successful PCI and 2 TANNER to the mid LAD.  Mildly elevated LV filling pressures.    ASSESSMENT:      Diagnosis Plan   1. Coronary artery disease involving native coronary artery of native heart without angina pectoris           PLAN OF CARE:     1. CAD.  Patient with recent episode of angina and dyspnea.  Patient found to have single-vessel CAD with 70% and 80% stenosis in the mid LAD.  Patient was successfully treated with PCI and TANNER to the LAD.  Patient is on DAPT with aspirin and Plavix.  He was educated on the importance of compliance for a minimum of 1 year without stopping.  Patient also on GDMT with atorvastatin and metoprolol tartrate.  Patient states that he has his lipid panel checked routinely by his primary care physician and reports that it is been stable.  He is unsure what caused him to have CAD at such a young age.  Patient was advised to get 30 minutes a day at least 3 days a week of cardiovascular exercise.  His catheter insertion site is healing well without complications.  Patient is currently asymptomatic since stent placement.  2. Follow-up with Dr. Torres in 3-4 months.  Sooner for any problems or complications.    This patient has consented to a telehealth visit via video. I spent 27 minutes in total including but not limited to the direct conversation with this patient. All vitals recorded  within this visit are reported by the patient.         Discharge Medications           Accurate as of May 6, 2020  3:16 PM. If you have any questions, ask your nurse or doctor.               Continue These Medications      Instructions Start Date   aspirin 81 MG chewable tablet   81 mg, Oral, Daily      atorvastatin 40 MG tablet  Commonly known as:  LIPITOR   40 mg, Oral, Daily      clopidogrel 75 MG tablet  Commonly known as:  PLAVIX   75 mg, Oral, Daily      metoprolol tartrate 25 MG tablet  Commonly known as:  LOPRESSOR   25 mg, Oral, 2 Times Daily      nitroglycerin 0.4 MG SL tablet  Commonly known as:  NITROSTAT   0.4 mg, Sublingual, Every 5 Minutes PRN, Place one tablet under tongue as needed for chest pain.             Thank you for allowing me to participate in the care of your patient,         NIKI Pack  [facility]  05/06/20  2:51 PM      **Adolph Disclaimer:**  Much of this encounter note is an electronic transcription/translation of spoken language to printed text. The electronic translation of spoken language may permit erroneous, or at times, nonsensical words or phrases to be inadvertently transcribed. Although I have reviewed the note for such errors, some may still exist.

## 2020-05-14 RX ORDER — ATORVASTATIN CALCIUM 40 MG/1
40 TABLET, FILM COATED ORAL DAILY
Qty: 90 TABLET | Refills: 3 | Status: SHIPPED | OUTPATIENT
Start: 2020-05-14 | End: 2021-04-22

## 2020-05-14 RX ORDER — CLOPIDOGREL BISULFATE 75 MG/1
75 TABLET ORAL DAILY
Qty: 90 TABLET | Refills: 3 | Status: SHIPPED | OUTPATIENT
Start: 2020-05-14 | End: 2021-04-16

## 2020-05-14 RX ORDER — ASPIRIN 81 MG/1
81 TABLET, CHEWABLE ORAL DAILY
Qty: 90 TABLET | Refills: 3 | Status: SHIPPED | OUTPATIENT
Start: 2020-05-14 | End: 2022-04-21 | Stop reason: HOSPADM

## 2020-05-14 RX ORDER — NITROGLYCERIN 0.4 MG/1
0.4 TABLET SUBLINGUAL
Qty: 25 TABLET | Refills: 3 | Status: SHIPPED | OUTPATIENT
Start: 2020-05-14

## 2020-06-17 ENCOUNTER — TRANSCRIBE ORDERS (OUTPATIENT)
Dept: NUTRITION | Facility: HOSPITAL | Age: 45
End: 2020-06-17

## 2020-06-17 DIAGNOSIS — E66.9 OBESITY, UNSPECIFIED CLASSIFICATION, UNSPECIFIED OBESITY TYPE, UNSPECIFIED WHETHER SERIOUS COMORBIDITY PRESENT: ICD-10-CM

## 2020-06-17 DIAGNOSIS — Z03.89 CORONARY ARTERY DISEASE (CAD) EXCLUDED: Primary | ICD-10-CM

## 2020-07-10 ENCOUNTER — HOSPITAL ENCOUNTER (OUTPATIENT)
Dept: NUTRITION | Facility: HOSPITAL | Age: 45
Discharge: HOME | End: 2020-07-10
Admitting: NURSE PRACTITIONER

## 2020-07-10 PROCEDURE — 97802 MEDICAL NUTRITION INDIV IN: CPT | Performed by: DIETITIAN, REGISTERED

## 2020-08-04 ENCOUNTER — TELEPHONE (OUTPATIENT)
Dept: CARDIOLOGY | Facility: CLINIC | Age: 45
End: 2020-08-04

## 2020-08-04 NOTE — TELEPHONE ENCOUNTER
Fax received from [facility] saying the pt needs fillings, extractions and crowns.  They are asking if the can have epi, if he needs premedicated and how long he can hold the Plavix and ASA?     Fax in your box to review.    LOV 05-06-20 tele med with APRN & Echo  04-24-20.  LOV with you 04-24-20    Heart cath on 04-27-20      HPI  05/06/2020  Blake Dennis is a 45 y.o. male who presents today via video visit secondary to COVID pandemic. Patient presents today for hospital follow-up.      Pt has a  history significant for CAD, HTN.     Patient was recently seen by Dr. Torres for chest pain and shortness of breath.  Patient had stress echocardiogram performed in office which revealed evidence of myocardial ischemia in the anterior wall with ST segment depression of 1 mm during stress.  Patient was subsequently taken to the cardiac catheterization lab on 4/27/2020 which revealed 70% stenosis in the mid LAD as well as an 80% lesion in the mid LAD.  Patient was successfully treated with TANNER and PCI.  He was started on GDMT.

## 2020-08-04 NOTE — TELEPHONE ENCOUNTER
So recommendations for dental procedures are the stents have to be over 30 days old which clearly this patient falls and that category.  They cannot hold the aspirin and Plavix need to minimize epinephrine as much as possible.

## 2020-08-04 NOTE — TELEPHONE ENCOUNTER
8/4/20  sg at 2:32  Left by Shaji with [facility] patient has appt today at 3:40 - asking for the med release form to be faxed backto them with instructions.  I called her back and read Dr. Torres's response to her but told her we fax to her.  Their fax is [phone].  /galen

## 2021-01-27 ENCOUNTER — OFFICE VISIT (OUTPATIENT)
Dept: CARDIOLOGY | Facility: CLINIC | Age: 46
End: 2021-01-27

## 2021-01-27 VITALS
HEIGHT: 70 IN | WEIGHT: 273 LBS | SYSTOLIC BLOOD PRESSURE: 122 MMHG | BODY MASS INDEX: 39.08 KG/M2 | OXYGEN SATURATION: 99 % | DIASTOLIC BLOOD PRESSURE: 68 MMHG | HEART RATE: 51 BPM

## 2021-01-27 DIAGNOSIS — R07.2 PRECORDIAL PAIN: ICD-10-CM

## 2021-01-27 DIAGNOSIS — I25.10 CORONARY ARTERY DISEASE INVOLVING NATIVE CORONARY ARTERY OF NATIVE HEART WITHOUT ANGINA PECTORIS: Primary | ICD-10-CM

## 2021-01-27 PROCEDURE — 99214 OFFICE O/P EST MOD 30 MIN: CPT | Performed by: INTERNAL MEDICINE

## 2021-01-27 NOTE — PROGRESS NOTES
"      CARDIOLOGY    Bo Torres MD    ENCOUNTER DATE:  01/27/2021    Blake Dennis / 45 y.o. / male        CHIEF COMPLAINT / REASON FOR OFFICE VISIT     Chest Pain  Coronary artery disease    HISTORY OF PRESENT ILLNESS       HPI  Blake Dennis is a 45 y.o. male who presents today for evaluation for recurrent chest discomfort.  Patient said that last week he had episodes of chest discomfort.  They were substernal in nature and said it was different than what he had prior to his angioplasties.  He says he walks about a mile to work and up three flights of stairs.  Before he could not do that he had chest pain shortness of breath.  Has not been the current scenario.  Said last week he started having chest discomforts that pretty much persisted for many days.  He saw his primary care provider did an EKG that was available to me which was completely normal.  He also had a troponin that was negative this was after having several days of chest discomfort.  He said the pain went away has not come back he is feeling fine.  His primary care provider also gave him a nitroglycerin which did not affect the pain.      The following portions of the patient's history were reviewed and updated as appropriate: allergies, current medications, past family history, past medical history, past social history, past surgical history and problem list.      VITAL SIGNS     Visit Vitals  /68   Pulse 51   Ht 177.8 cm (70\")   Wt 124 kg (273 lb)   SpO2 99%   BMI 39.17 kg/m²         Wt Readings from Last 3 Encounters:   01/27/21 124 kg (273 lb)   04/24/20 128 kg (282 lb 9.6 oz)     Body mass index is 39.17 kg/m².      REVIEW OF SYSTEMS   Review of Systems   All other systems reviewed and are negative.          PHYSICAL EXAMINATION     Vitals signs reviewed.   Constitutional:       Appearance: Healthy appearance.   Pulmonary:      Effort: Pulmonary effort is normal.   Cardiovascular:      PMI at left midclavicular line. Normal rate. " Regular rhythm. Normal S1. Normal S2.      Murmurs: There is no murmur.      No gallop. No click. No rub.   Pulses:     Intact distal pulses.   Edema:     Peripheral edema absent.           REVIEWED DATA     Procedures    Cardiac Procedures:  1.           ASSESSMENT & PLAN      Diagnosis Plan   1. Coronary artery disease involving native coronary artery of native heart without angina pectoris     2. Precordial pain           SUMMARY/DISCUSSION  1. Patient with known coronary artery disease who is now having chest discomfort.  Patient's pain is very atypical.  ECG is unremarkable pain resolved on its own and was not exertional.  Nitroglycerin also did not help.  At this point I do not think any further testing is necessary.  Follow him clinically he is going to get back in the gym I told him to do so see how he does.  It does not sound to be cardiac in origin.  2. He did ask about taking his medications due to his young age I would continue them indefinitely unless there is a contraindication.  3. Follow-up 1 year        MEDICATIONS         Discharge Medications          Accurate as of January 27, 2021  1:41 PM. If you have any questions, ask your nurse or doctor.            Continue These Medications      Instructions Start Date   aspirin 81 MG chewable tablet   81 mg, Oral, Daily      atorvastatin 40 MG tablet  Commonly known as: LIPITOR   40 mg, Oral, Daily      clopidogrel 75 MG tablet  Commonly known as: PLAVIX   75 mg, Oral, Daily      metoprolol tartrate 25 MG tablet  Commonly known as: LOPRESSOR   25 mg, Oral, 2 Times Daily      nitroglycerin 0.4 MG SL tablet  Commonly known as: NITROSTAT   0.4 mg, Sublingual, Every 5 Minutes PRN, Place one tablet under tongue as needed for chest pain.                 **Dragon Disclaimer:   Much of this encounter note is an electronic transcription/translation of spoken language to printed text. The electronic translation of spoken language may permit erroneous, or at times,  nonsensical words or phrases to be inadvertently transcribed. Although I have reviewed the note for such errors, some may still exist.

## 2021-02-16 ENCOUNTER — TELEPHONE (OUTPATIENT)
Dept: CARDIOLOGY | Facility: CLINIC | Age: 46
End: 2021-02-16

## 2021-02-16 NOTE — TELEPHONE ENCOUNTER
The patient would like to talk to you about his labs that were sent over from [facility] on 1/21/21. These have been scanned into epic. According to the patient the APRN was waiting to hear back and discuss the labs.

## 2021-02-17 ENCOUNTER — TELEPHONE (OUTPATIENT)
Dept: CARDIOLOGY | Facility: CLINIC | Age: 46
End: 2021-02-17

## 2021-02-17 NOTE — TELEPHONE ENCOUNTER
Patient calling wanting to know if we received the labs from his PCP. I informed him that we did and we are awaiting BH to review when he is back in office.

## 2021-02-18 NOTE — TELEPHONE ENCOUNTER
02/18/21  15:07 EST    Called and spoke with patient.  Informed him that laboratory results were normal.  He states that he continues to have chest discomfort from time to time that is described as midsternal.  He is unable to ascertain any exacerbating or alleviating symptoms.  He does work out very vigorously and does not have any exertional symptoms.  He states that when he has pain that he gets anxious and then feels like the pain worsens.  I explained to patient that this sounded atypical and could be from stress/anxiety.  Patient verbalized understanding.  I informed him that if pain became exertional to please notify the office.      NIKI Ojeda  [facility]

## 2021-04-16 RX ORDER — CLOPIDOGREL BISULFATE 75 MG/1
TABLET ORAL
Qty: 90 TABLET | Refills: 3 | Status: SHIPPED | OUTPATIENT
Start: 2021-04-16 | End: 2022-04-11

## 2021-04-22 RX ORDER — ATORVASTATIN CALCIUM 40 MG/1
TABLET, FILM COATED ORAL
Qty: 90 TABLET | Refills: 3 | Status: SHIPPED | OUTPATIENT
Start: 2021-04-22 | End: 2022-03-16

## 2022-01-10 ENCOUNTER — HOSPITAL ENCOUNTER (EMERGENCY)
Facility: HOSPITAL | Age: 47
Discharge: HOME | End: 2022-01-10
Attending: EMERGENCY MEDICINE | Admitting: EMERGENCY MEDICINE

## 2022-01-10 ENCOUNTER — APPOINTMENT (OUTPATIENT)
Dept: GENERAL RADIOLOGY | Facility: HOSPITAL | Age: 47
End: 2022-01-10

## 2022-01-10 VITALS
BODY MASS INDEX: 40.09 KG/M2 | SYSTOLIC BLOOD PRESSURE: 123 MMHG | RESPIRATION RATE: 16 BRPM | WEIGHT: 280 LBS | TEMPERATURE: 98 F | HEART RATE: 58 BPM | HEIGHT: 70 IN | DIASTOLIC BLOOD PRESSURE: 80 MMHG | OXYGEN SATURATION: 100 %

## 2022-01-10 DIAGNOSIS — R07.89 ATYPICAL CHEST PAIN: Primary | ICD-10-CM

## 2022-01-10 LAB
ALBUMIN SERPL-MCNC: 4.9 G/DL (ref 3.5–5.2)
ALBUMIN/GLOB SERPL: 1.8 G/DL
ALP SERPL-CCNC: 58 U/L (ref 39–117)
ALT SERPL W P-5'-P-CCNC: 49 U/L (ref 1–41)
ANION GAP SERPL CALCULATED.3IONS-SCNC: 13 MMOL/L (ref 5–15)
AST SERPL-CCNC: 30 U/L (ref 1–40)
BASOPHILS # BLD AUTO: 0.03 10*3/MM3 (ref 0–0.2)
BASOPHILS NFR BLD AUTO: 0.3 % (ref 0–1.5)
BILIRUB SERPL-MCNC: 0.7 MG/DL (ref 0–1.2)
BUN SERPL-MCNC: 18 MG/DL (ref 6–20)
BUN/CREAT SERPL: 12.9 (ref 7–25)
CALCIUM SPEC-SCNC: 10 MG/DL (ref 8.6–10.5)
CHLORIDE SERPL-SCNC: 99 MMOL/L (ref 98–107)
CO2 SERPL-SCNC: 25 MMOL/L (ref 22–29)
CREAT SERPL-MCNC: 1.39 MG/DL (ref 0.76–1.27)
DEPRECATED RDW RBC AUTO: 39.5 FL (ref 37–54)
EOSINOPHIL # BLD AUTO: 0.04 10*3/MM3 (ref 0–0.4)
EOSINOPHIL NFR BLD AUTO: 0.4 % (ref 0.3–6.2)
ERYTHROCYTE [DISTWIDTH] IN BLOOD BY AUTOMATED COUNT: 12.8 % (ref 12.3–15.4)
GFR SERPL CREATININE-BSD FRML MDRD: 56 ML/MIN/1.73
GLOBULIN UR ELPH-MCNC: 2.8 GM/DL
GLUCOSE SERPL-MCNC: 91 MG/DL (ref 65–99)
HCT VFR BLD AUTO: 44.9 % (ref 37.5–51)
HGB BLD-MCNC: 15.1 G/DL (ref 13–17.7)
HOLD SPECIMEN: NORMAL
HOLD SPECIMEN: NORMAL
IMM GRANULOCYTES # BLD AUTO: 0.04 10*3/MM3 (ref 0–0.05)
IMM GRANULOCYTES NFR BLD AUTO: 0.4 % (ref 0–0.5)
LYMPHOCYTES # BLD AUTO: 1.38 10*3/MM3 (ref 0.7–3.1)
LYMPHOCYTES NFR BLD AUTO: 14.7 % (ref 19.6–45.3)
MCH RBC QN AUTO: 28.8 PG (ref 26.6–33)
MCHC RBC AUTO-ENTMCNC: 33.6 G/DL (ref 31.5–35.7)
MCV RBC AUTO: 85.7 FL (ref 79–97)
MONOCYTES # BLD AUTO: 0.79 10*3/MM3 (ref 0.1–0.9)
MONOCYTES NFR BLD AUTO: 8.4 % (ref 5–12)
NEUTROPHILS NFR BLD AUTO: 7.1 10*3/MM3 (ref 1.7–7)
NEUTROPHILS NFR BLD AUTO: 75.8 % (ref 42.7–76)
NRBC BLD AUTO-RTO: 0 /100 WBC (ref 0–0.2)
PLATELET # BLD AUTO: 272 10*3/MM3 (ref 140–450)
PMV BLD AUTO: 10.7 FL (ref 6–12)
POTASSIUM SERPL-SCNC: 4.1 MMOL/L (ref 3.5–5.2)
PROT SERPL-MCNC: 7.7 G/DL (ref 6–8.5)
QT INTERVAL: 413 MS
QT INTERVAL: 421 MS
RBC # BLD AUTO: 5.24 10*6/MM3 (ref 4.14–5.8)
SODIUM SERPL-SCNC: 137 MMOL/L (ref 136–145)
TROPONIN T SERPL-MCNC: <0.01 NG/ML (ref 0–0.03)
TROPONIN T SERPL-MCNC: <0.01 NG/ML (ref 0–0.03)
WBC NRBC COR # BLD: 9.38 10*3/MM3 (ref 3.4–10.8)
WHOLE BLOOD HOLD SPECIMEN: NORMAL
WHOLE BLOOD HOLD SPECIMEN: NORMAL

## 2022-01-10 PROCEDURE — 84484 ASSAY OF TROPONIN QUANT: CPT | Performed by: EMERGENCY MEDICINE

## 2022-01-10 PROCEDURE — 85025 COMPLETE CBC W/AUTO DIFF WBC: CPT | Performed by: EMERGENCY MEDICINE

## 2022-01-10 PROCEDURE — 36415 COLL VENOUS BLD VENIPUNCTURE: CPT

## 2022-01-10 PROCEDURE — 93005 ELECTROCARDIOGRAM TRACING: CPT | Performed by: EMERGENCY MEDICINE

## 2022-01-10 PROCEDURE — 99283 EMERGENCY DEPT VISIT LOW MDM: CPT

## 2022-01-10 PROCEDURE — 71045 X-RAY EXAM CHEST 1 VIEW: CPT

## 2022-01-10 PROCEDURE — 93010 ELECTROCARDIOGRAM REPORT: CPT | Performed by: INTERNAL MEDICINE

## 2022-01-10 PROCEDURE — 99283 EMERGENCY DEPT VISIT LOW MDM: CPT | Performed by: EMERGENCY MEDICINE

## 2022-01-10 PROCEDURE — 80053 COMPREHEN METABOLIC PANEL: CPT | Performed by: EMERGENCY MEDICINE

## 2022-01-10 RX ORDER — SODIUM CHLORIDE 0.9 % (FLUSH) 0.9 %
10 SYRINGE (ML) INJECTION AS NEEDED
Status: DISCONTINUED | OUTPATIENT
Start: 2022-01-10 | End: 2022-01-11 | Stop reason: HOSPADM

## 2022-01-10 NOTE — ED PROVIDER NOTES
"Subjective     History provided by:  Patient and medical records    History of Present Illness    · Chief complaint: Chest pain    · Location: Lower substernal that moves to the lower left chest into the lower right chest.  Not radiating, just changing location.    · Quality/Severity: Patient describes it as a a dull pain.  Is been intermittent until today when it was constant more severe.  The pain is not similar to the pain he had before he had his stents placed.    · Timing/Onset: Been present for over a month.    · Modifying Factors: No aggravating factors.  He did took a sublingual nitroglycerin today and that improve the pain.    · Associated symptoms: Denies associated shortness of breath, diaphoresis or fatigue.    · Narrative: The patient is a 46-year-old white male who states he has had a localized chest pain that he describes as dull in the lower substernal area that goes and comes and sometimes occurs in the lower left chest or in the lower right chest.  The pain does not radiate.  He saw his PCP Herminia Simmons in [facility] for the pain the week before [place] and was diagnosed with \"a pulled muscle\".  He states the pain today has been constant and more intense.  He took a sublingual nitroglycerin that improved the discomfort.  The patient states that he walked and ran 5 miles yesterday on the treadmill without any chest discomfort.  He is status post 2 stents placed in his mid and distal LAD 4/26/2020.  At that time that Showed some luminal irregularities in the RCA and the 2 diagonal branches.  There is 30% disease in the circumflex.  His mother does not have a history of heart disease and he does not know his father.  He is not a smoker and is not diabetic.  He does have a history of hypertension.    Review of Systems   Constitutional: Negative for activity change, appetite change, chills, diaphoresis, fatigue and fever.   HENT: Negative for congestion, dental problem, ear pain, hearing " "loss, mouth sores, postnasal drip, rhinorrhea, sinus pressure, sore throat and voice change.    Eyes: Negative for photophobia, pain, discharge, redness and visual disturbance.   Respiratory: Negative for cough, chest tightness, shortness of breath, wheezing and stridor.    Cardiovascular: Positive for chest pain. Negative for palpitations and leg swelling.   Gastrointestinal: Negative for abdominal pain, diarrhea, nausea and vomiting.   Genitourinary: Negative for difficulty urinating, dysuria, flank pain, frequency, hematuria and urgency.   Musculoskeletal: Negative for arthralgias, back pain, gait problem, joint swelling, myalgias, neck pain and neck stiffness.   Skin: Negative for color change and rash.   Neurological: Negative for dizziness, tremors, seizures, syncope, facial asymmetry, speech difficulty, weakness, light-headedness, numbness and headaches.   Hematological: Negative for adenopathy.   Psychiatric/Behavioral: Negative.  Negative for confusion and decreased concentration. The patient is not nervous/anxious.      Past Medical History:   Diagnosis Date   • BMI 38.0-38.9,adult    • CAD (coronary artery disease)    • Chest pain    • Hypertension      /80   Pulse 58   Temp 98 °F (36.7 °C) (Oral)   Resp 16   Ht 177.8 cm (70\")   Wt 127 kg (280 lb)   SpO2 100%   BMI 40.18 kg/m²     Past Medical History:   Diagnosis Date   • BMI 38.0-38.9,adult    • CAD (coronary artery disease)    • Chest pain    • Hypertension        No Known Allergies    Past Surgical History:   Procedure Laterality Date   • CARDIAC CATHETERIZATION N/A 4/27/2020    Procedure: Coronary angiography;  Surgeon: Mj Anne MD;  Location: [facility] INVASIVE LOCATION;  Service: Cardiovascular;  Laterality: N/A;   • CARDIAC CATHETERIZATION N/A 4/27/2020    Procedure: Left heart cath;  Surgeon: Mj Anne MD;  Location: [facility] CATH INVASIVE LOCATION;  Service: Cardiovascular;  Laterality: N/A;   • CARDIAC " CATHETERIZATION N/A 4/27/2020    Procedure: Left ventriculography;  Surgeon: Mj Anne MD;  Location:  [facility] CATH INVASIVE LOCATION;  Service: Cardiovascular;  Laterality: N/A;   • CARDIAC CATHETERIZATION N/A 4/27/2020    Procedure: Stent TANNER coronary;  Surgeon: Mj Anne MD;  Location:  [facility] CATH INVASIVE LOCATION;  Service: Cardiovascular;  Laterality: N/A;       Family History   Problem Relation Age of Onset   • Hypertension Mother    • Cancer Mother        Social History     Socioeconomic History   • Marital status:    Tobacco Use   • Smoking status: Never Smoker   • Smokeless tobacco: Never Used   • Tobacco comment: caffeine use 1 cup coffee daily   Substance and Sexual Activity   • Alcohol use: Yes     Comment: social   • Drug use: Not Currently   • Sexual activity: Defer           Objective   Physical Exam  Vitals and nursing note reviewed.   Constitutional:       General: He is not in acute distress.     Appearance: Normal appearance. He is well-developed. He is not ill-appearing, toxic-appearing or diaphoretic.   HENT:      Head: Normocephalic and atraumatic.      Nose: Nose normal. No congestion or rhinorrhea.      Mouth/Throat:      Mouth: Mucous membranes are moist.      Pharynx: Oropharynx is clear. No oropharyngeal exudate.   Eyes:      General: No scleral icterus.        Right eye: No discharge.         Left eye: No discharge.      Conjunctiva/sclera: Conjunctivae normal.      Pupils: Pupils are equal, round, and reactive to light.   Neck:      Thyroid: No thyromegaly.      Vascular: No JVD.   Cardiovascular:      Rate and Rhythm: Normal rate and regular rhythm.      Heart sounds: Normal heart sounds. No murmur heard.      Pulmonary:      Effort: Pulmonary effort is normal.      Breath sounds: Normal breath sounds. No wheezing, rhonchi or rales.   Chest:      Chest wall: No tenderness.   Abdominal:      General: Bowel sounds are normal. There is no distension.       Palpations: Abdomen is soft.      Tenderness: There is no abdominal tenderness.   Musculoskeletal:         General: No tenderness or deformity. Normal range of motion.      Cervical back: Normal range of motion and neck supple.   Lymphadenopathy:      Cervical: No cervical adenopathy.   Skin:     General: Skin is warm and dry.      Capillary Refill: Capillary refill takes less than 2 seconds.      Coloration: Skin is not pale.      Findings: No erythema or rash.   Neurological:      General: No focal deficit present.      Mental Status: He is alert and oriented to person, place, and time.      Cranial Nerves: No cranial nerve deficit.      Coordination: Coordination normal.      Comments: No focal motor sensory deficit   Psychiatric:         Mood and Affect: Mood normal.         Behavior: Behavior normal.         Thought Content: Thought content normal.         Judgment: Judgment normal.         Procedures           ED Course  ED Course as of 01/10/22 2321   Mon Valdez 10, 2022   1926 My interpretation of the patient is EKG tracing performed 18:05 is normal sinus rhythm with a rate of 64, normal axis, normal conduction, no acute ST segment elevation or depression consistent with ischemia, no ectopy, normal R wave transition, normal SC and QT intervals.  Normal EKG. [TP]   1927 The patient's chest x-ray was interpreted by me and the radiologist as no acute disease. [TP]   1927 Review of the patient's laboratory studies: His cardiac troponin was negative.  CBC normal.  CMP has normal electrolytes and normal liver function test.  His creatinine was elevated at 1.39 with a diminished GFR at 56 with a normal BUN of 18 consistent with mild renal insufficiency. [TP]   1927 My interpretation of the patient's delta EKG tracing performed 20: 16 is sinus bradycardia with a rate of 50, normal axis, normal conduction, no acute ST segment elevation or depression consistent with ischemia, no ectopy, normal SC and QT intervals.   Normal EKG.  No change in comparison to tracing performed 18: 07 today. [TP]   2104 The patient's delta troponin is negative. [TP]   2105 The patient's heart score is a 1 for risk factors only. [TP]   2319 Is my impression the patient has atypical chest pain.  At 22:12 the patient was discussed with his cardiologist Dr. Torres who requested the patient call tomorrow to make an appointment to follow-up with him or his nurse practitioner in a week. [TP]      ED Course User Index  [TP] Amando Rico MD                                                 MDM  Number of Diagnoses or Management Options  Atypical chest pain: new and requires workup     Amount and/or Complexity of Data Reviewed  Clinical lab tests: reviewed and ordered  Tests in the radiology section of CPT®: ordered and reviewed  Tests in the medicine section of CPT®: ordered and reviewed    Risk of Complications, Morbidity, and/or Mortality  Presenting problems: high  Diagnostic procedures: high  Management options: high  General comments: My differential diagnosis for chest pain includes but is not limited to:  Muscle strain, costochondritis, myositis, pleurisy, rib fracture, intercostal neuritis, herpes zoster, tumor, myocardial infarction, coronary syndrome, unstable angina, angina, aortic dissection, mitral valve prolapse, pericarditis, palpitations, pulmonary embolus, pneumonia, pneumothorax, lung cancer, GERD, esophagitis, esophageal spasm    Patient Progress  Patient progress: stable      Final diagnoses:   Atypical chest pain       ED Disposition  ED Disposition     ED Disposition Condition Comment    Discharge Stable           Bo Torres MD  [address] [address]  [phone]    Call in 1 day  Discussed with him when he can see you.         Medication List      No changes were made to your prescriptions during this visit.         Labs Reviewed   COMPREHENSIVE METABOLIC PANEL - Abnormal; Notable for the following  components:       Result Value    Creatinine 1.39 (*)     ALT (SGPT) 49 (*)     eGFR Non  Amer 56 (*)     All other components within normal limits    Narrative:     GFR Normal >60  Chronic Kidney Disease <60  Kidney Failure <15     CBC WITH AUTO DIFFERENTIAL - Abnormal; Notable for the following components:    Lymphocyte % 14.7 (*)     Neutrophils, Absolute 7.10 (*)     All other components within normal limits   TROPONIN (IN-HOUSE) - Normal    Narrative:     Troponin T Reference Range:  <= 0.03 ng/mL-   Negative for AMI  >0.03 ng/mL-     Abnormal for myocardial necrosis.  Clinicians would have to utilize clinical acumen, EKG, Troponin and serial changes to determine if it is an Acute Myocardial Infarction or myocardial injury due to an underlying chronic condition.       Results may be falsely decreased if patient taking Biotin.     TROPONIN (IN-HOUSE) - Normal    Narrative:     Troponin T Reference Range:  <= 0.03 ng/mL-   Negative for AMI  >0.03 ng/mL-     Abnormal for myocardial necrosis.  Clinicians would have to utilize clinical acumen, EKG, Troponin and serial changes to determine if it is an Acute Myocardial Infarction or myocardial injury due to an underlying chronic condition.       Results may be falsely decreased if patient taking Biotin.     RAINBOW DRAW    Narrative:     The following orders were created for panel order [place] Draw.  Procedure                               Abnormality         Status                     ---------                               -----------         ------                     Green Top (Gel)[[id number]]                                  Final result               Lavender Top[[id number]]                                     Final result               Gold Top - SST[[id number]]                                   Final result               Light Blue Top[[id number]]                                   Final result                 Please view results for these tests on the  individual orders.   GREEN TOP   LAVENDER TOP   GOLD TOP - SST   LIGHT BLUE TOP   CBC AND DIFFERENTIAL    Narrative:     The following orders were created for panel order CBC & Differential.  Procedure                               Abnormality         Status                     ---------                               -----------         ------                     CBC Auto Differential[[id number]]        Abnormal            Final result                 Please view results for these tests on the individual orders.     XR Chest 1 View   Final Result   Low lung volumes with crowding of pulmonary parenchymal markings at bases, otherwise no active disease.      Signer Name: Ayesha Del Real MD    Signed: 1/10/2022 6:52 PM    Workstation Name: [email or web address]     [facility]             Medication List      No changes were made to your prescriptions during this visit.              Amando Rico MD  01/10/22 1842

## 2022-01-11 NOTE — ED NOTES
Called [facility].  Dr Torres to return call.     Bridgett Reyes  01/10/22 2118

## 2022-01-12 ENCOUNTER — OFFICE VISIT (OUTPATIENT)
Dept: CARDIOLOGY | Facility: CLINIC | Age: 47
End: 2022-01-12

## 2022-01-12 ENCOUNTER — TELEPHONE (OUTPATIENT)
Dept: CARDIOLOGY | Facility: CLINIC | Age: 47
End: 2022-01-12

## 2022-01-12 VITALS
HEART RATE: 53 BPM | HEIGHT: 70 IN | DIASTOLIC BLOOD PRESSURE: 78 MMHG | BODY MASS INDEX: 40.49 KG/M2 | WEIGHT: 282.8 LBS | SYSTOLIC BLOOD PRESSURE: 124 MMHG

## 2022-01-12 DIAGNOSIS — G47.33 OSA (OBSTRUCTIVE SLEEP APNEA): ICD-10-CM

## 2022-01-12 DIAGNOSIS — I25.10 CORONARY ARTERY DISEASE INVOLVING NATIVE CORONARY ARTERY OF NATIVE HEART WITHOUT ANGINA PECTORIS: Primary | ICD-10-CM

## 2022-01-12 DIAGNOSIS — R07.89 CHEST PAIN, ATYPICAL: ICD-10-CM

## 2022-01-12 PROCEDURE — 99214 OFFICE O/P EST MOD 30 MIN: CPT | Performed by: NURSE PRACTITIONER

## 2022-01-12 PROCEDURE — 93000 ELECTROCARDIOGRAM COMPLETE: CPT | Performed by: NURSE PRACTITIONER

## 2022-01-12 RX ORDER — LANSOPRAZOLE 30 MG/1
30 CAPSULE, DELAYED RELEASE ORAL DAILY
Qty: 30 CAPSULE | Refills: 3 | Status: ON HOLD | OUTPATIENT
Start: 2022-01-12 | End: 2022-04-21

## 2022-01-12 NOTE — TELEPHONE ENCOUNTER
Please contact PCP for copy of most recent labs.  Please inquire if there is a lipid panel and hemoglobin A1c as well.

## 2022-01-12 NOTE — PROGRESS NOTES
Date of Office Visit: 22  Encounter Provider: NIKI Abdullahi  Place of Service: [facility] CARDIOLOGY  Patient Name: Blake Dennis  :1975    Chief Complaint   Patient presents with   • Coronary Artery Disease   :     HPI: Blake Dennis is a 46 y.o. male  with hypertension, coronary artery disease, obstructive sleep apnea, and obesity.    He has no pertinent family history however he does not know his father's side history.    He is followed by Dr. Torres. I will visit with him for the first time and have reviewed his medical record.     In approximately  he was followed for obstructive sleep apnea but lost approximately 100 pounds and then was retested and was told he did not have sleep apnea.  In 2020 he had an abnormal stress echo with evidence of ischemia in the anterior wall.  He had ST segment depression of 1 mm during stress beginning at 6 minutes.  He had preserved left ventricular systolic function.  Proceeded to have cardiac catheterization where he was found to have a 70% mid LAD stenosis with a sequential 90% mid to distal segment stenosis.  There was 30% in the circumflex.  He received 2 drug-eluting stents to the mid LAD.      Presents for reassessment.  He was in the ER recently with chest pain and ruled out with 2 negative troponin and unremarkable chest x-ray.  He continues to complain of chest pain and he currently has chest pain in the right chest described as a pressure which is rated at 3 on a scale of 10.  There is no radiation or associated symptoms of nausea.  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  He normally does the treadmill 3 days a week for an hour and the last time he did that he actually did not have any chest pain.  He states his current chest pain is not similar to what he had prior to stenting in .  He also reports some weight gain and he has arranged for repeat sleep study.  He is scheduled to have that completed  "tomorrow  No Known Allergies        Family and social history reviewed.     ROS  All other systems were reviewed and are negative          Objective:     Vitals:    01/12/22 1406   BP: 124/78   BP Location: Left arm   Pulse: 53   Weight: 128 kg (282 lb 12.8 oz)   Height: 177.8 cm (70\")     Body mass index is 40.58 kg/m².    PHYSICAL EXAM:  Cardiovascular:      Regular rhythm.           ECG 12 Lead    Date/Time: 1/12/2022 4:34 PM  Performed by: Za Irizarry APRN  Authorized by: Za Irizarry APRN   Comparison: compared with previous ECG   Similar to previous ECG  Rhythm: sinus rhythm  Rate: normal  QRS axis: normal  Comments: No ischemic changes              Current Outpatient Medications   Medication Sig Dispense Refill   • aspirin 81 MG chewable tablet Chew 1 tablet Daily. 90 tablet 3   • atorvastatin (LIPITOR) 40 MG tablet TAKE 1 TABLET DAILY 90 tablet 3   • clopidogrel (PLAVIX) 75 MG tablet TAKE 1 TABLET DAILY 90 tablet 3   • metoprolol tartrate (LOPRESSOR) 25 MG tablet TAKE 1 TABLET TWICE A  tablet 3   • nitroglycerin (NITROSTAT) 0.4 MG SL tablet Place 1 tablet under the tongue Every 5 (Five) Minutes As Needed for Chest Pain. Place one tablet under tongue as needed for chest pain. 25 tablet 3   • lansoprazole (Prevacid) 30 MG capsule Take 1 capsule by mouth Daily. 30 capsule 3     No current facility-administered medications for this visit.     Assessment:       Diagnosis Plan   1. Coronary artery disease involving native coronary artery of native heart without angina pectoris     2. Chest pain, atypical  Adult Stress Echo W/ Cont or Stress Agent if Necessary Per Protocol        Orders Placed This Encounter   Procedures   • ECG 12 Lead     This order was created via procedure documentation     Order Specific Question:   Release to patient     Answer:   Immediate   • Adult Stress Echo W/ Cont or Stress Agent if Necessary Per Protocol     Standing Status:   Future     Standing Expiration Date:   " 1/12/2023     Order Specific Question:   What stress agent will be used?     Answer:   Exercise with [employer]     Order Specific Question:   Reason for exam?     Answer:   Coronary Artery Disease     Order Specific Question:   Reason for exam?     Answer:   Chest Pain     Order Specific Question:   Release to patient     Answer:   Immediate         Plan:   1.  46-year-old gentleman with coronary artery disease, preserved left ventricular systolic function status post 2 drug-eluting stents April 2020.  He currently has atypical chest pain.  This is actually nonexertional.  He has been exercising routinely on treadmill able to achieve up to 60 minutes without chest pain.  He is to return for stress echo to evaluate for ischemia.  In the meantime I will send in Prevacid I believe his symptoms may be GI related  2.  Hypertension blood pressure appears adequate  3.  Obesity BMI 40.58-he would benefit from routine exercise, weight loss, smaller portion sizes, minimal carbohydrates and increase vegetables  4.  History of obstructive sleep apnea.  He is scheduled for repeat sleep study tomorrow  5 hyperlipidemia-we will contact his PCP for most recent labs                  It has been a pleasure to participate in this patient's care.      Thank you,  NIKI Abdullahi      **I used Dragon to dictate this note:**

## 2022-01-13 ENCOUNTER — HOSPITAL ENCOUNTER (OUTPATIENT)
Dept: CARDIOLOGY | Facility: HOSPITAL | Age: 47
Discharge: HOME | End: 2022-01-13
Admitting: NURSE PRACTITIONER

## 2022-01-13 VITALS
SYSTOLIC BLOOD PRESSURE: 124 MMHG | HEIGHT: 70 IN | WEIGHT: 282.19 LBS | DIASTOLIC BLOOD PRESSURE: 80 MMHG | BODY MASS INDEX: 40.4 KG/M2 | HEART RATE: 67 BPM

## 2022-01-13 DIAGNOSIS — R07.89 CHEST PAIN, ATYPICAL: ICD-10-CM

## 2022-01-13 LAB
ASCENDING AORTA: 2.3 CM
BH CV ECHO MEAS - ACS: 1.7 CM
BH CV ECHO MEAS - AO ARCH DIAM (PROXIMAL TRANS.): 2.8 CM
BH CV ECHO MEAS - AO MAX PG (FULL): 5.2 MMHG
BH CV ECHO MEAS - AO MAX PG: 12.6 MMHG
BH CV ECHO MEAS - AO MEAN PG (FULL): 2.8 MMHG
BH CV ECHO MEAS - AO MEAN PG: 6.9 MMHG
BH CV ECHO MEAS - AO ROOT AREA (BSA CORRECTED): 1.3
BH CV ECHO MEAS - AO ROOT AREA: 8 CM^2
BH CV ECHO MEAS - AO ROOT DIAM: 3.2 CM
BH CV ECHO MEAS - AO V2 MAX: 177.8 CM/SEC
BH CV ECHO MEAS - AO V2 MEAN: 122.4 CM/SEC
BH CV ECHO MEAS - AO V2 VTI: 36.2 CM
BH CV ECHO MEAS - ASC AORTA: 2.3 CM
BH CV ECHO MEAS - AVA(I,A): 3.1 CM^2
BH CV ECHO MEAS - AVA(I,D): 3.1 CM^2
BH CV ECHO MEAS - AVA(V,A): 2.7 CM^2
BH CV ECHO MEAS - AVA(V,D): 2.7 CM^2
BH CV ECHO MEAS - BSA(HAYCOCK): 2.6 M^2
BH CV ECHO MEAS - BSA: 2.4 M^2
BH CV ECHO MEAS - BZI_BMI: 40.5 KILOGRAMS/M^2
BH CV ECHO MEAS - BZI_METRIC_HEIGHT: 177.8 CM
BH CV ECHO MEAS - BZI_METRIC_WEIGHT: 127.9 KG
BH CV ECHO MEAS - EDV(MOD-SP2): 129 ML
BH CV ECHO MEAS - EDV(MOD-SP4): 161 ML
BH CV ECHO MEAS - EDV(TEICH): 84.6 ML
BH CV ECHO MEAS - EF(CUBED): 63 %
BH CV ECHO MEAS - EF(MOD-BP): 69.3 %
BH CV ECHO MEAS - EF(MOD-SP2): 68.2 %
BH CV ECHO MEAS - EF(MOD-SP4): 68.3 %
BH CV ECHO MEAS - EF(TEICH): 54.8 %
BH CV ECHO MEAS - ESV(MOD-SP2): 41 ML
BH CV ECHO MEAS - ESV(MOD-SP4): 51 ML
BH CV ECHO MEAS - ESV(TEICH): 38.2 ML
BH CV ECHO MEAS - FS: 28.2 %
BH CV ECHO MEAS - IVS/LVPW: 0.89
BH CV ECHO MEAS - IVSD: 1 CM
BH CV ECHO MEAS - LAT PEAK E' VEL: 12.3 CM/SEC
BH CV ECHO MEAS - LV DIASTOLIC VOL/BSA (35-75): 66.7 ML/M^2
BH CV ECHO MEAS - LV MASS(C)D: 167.3 GRAMS
BH CV ECHO MEAS - LV MASS(C)DI: 69.3 GRAMS/M^2
BH CV ECHO MEAS - LV MAX PG: 7.4 MMHG
BH CV ECHO MEAS - LV MEAN PG: 4.2 MMHG
BH CV ECHO MEAS - LV SYSTOLIC VOL/BSA (12-30): 21.1 ML/M^2
BH CV ECHO MEAS - LV V1 MAX: 136.3 CM/SEC
BH CV ECHO MEAS - LV V1 MEAN: 96.8 CM/SEC
BH CV ECHO MEAS - LV V1 VTI: 31.2 CM
BH CV ECHO MEAS - LVIDD: 4.3 CM
BH CV ECHO MEAS - LVIDS: 3.1 CM
BH CV ECHO MEAS - LVLD AP2: 8 CM
BH CV ECHO MEAS - LVLD AP4: 8.7 CM
BH CV ECHO MEAS - LVLS AP2: 7.3 CM
BH CV ECHO MEAS - LVLS AP4: 7.1 CM
BH CV ECHO MEAS - LVOT AREA (M): 3.5 CM^2
BH CV ECHO MEAS - LVOT AREA: 3.6 CM^2
BH CV ECHO MEAS - LVOT DIAM: 2.1 CM
BH CV ECHO MEAS - LVPWD: 1.2 CM
BH CV ECHO MEAS - MED PEAK E' VEL: 14.5 CM/SEC
BH CV ECHO MEAS - MV A DUR: 0.13 SEC
BH CV ECHO MEAS - MV A MAX VEL: 64.7 CM/SEC
BH CV ECHO MEAS - MV DEC SLOPE: 372.2 CM/SEC^2
BH CV ECHO MEAS - MV DEC TIME: 0.17 SEC
BH CV ECHO MEAS - MV E MAX VEL: 101 CM/SEC
BH CV ECHO MEAS - MV E/A: 1.6
BH CV ECHO MEAS - MV MAX PG: 5 MMHG
BH CV ECHO MEAS - MV MEAN PG: 1.5 MMHG
BH CV ECHO MEAS - MV P1/2T MAX VEL: 108.6 CM/SEC
BH CV ECHO MEAS - MV P1/2T: 85.5 MSEC
BH CV ECHO MEAS - MV V2 MAX: 111.3 CM/SEC
BH CV ECHO MEAS - MV V2 MEAN: 56.1 CM/SEC
BH CV ECHO MEAS - MV V2 VTI: 38 CM
BH CV ECHO MEAS - MVA P1/2T LCG: 2 CM^2
BH CV ECHO MEAS - MVA(P1/2T): 2.6 CM^2
BH CV ECHO MEAS - MVA(VTI): 2.9 CM^2
BH CV ECHO MEAS - PA ACC TIME: 0.13 SEC
BH CV ECHO MEAS - PA MAX PG (FULL): 3.5 MMHG
BH CV ECHO MEAS - PA MAX PG: 6.4 MMHG
BH CV ECHO MEAS - PA PR(ACCEL): 19.6 MMHG
BH CV ECHO MEAS - PA V2 MAX: 126.2 CM/SEC
BH CV ECHO MEAS - PULM A REVS DUR: 0.17 SEC
BH CV ECHO MEAS - PULM A REVS VEL: 29.7 CM/SEC
BH CV ECHO MEAS - PULM DIAS VEL: 45.4 CM/SEC
BH CV ECHO MEAS - PULM S/D: 1.2
BH CV ECHO MEAS - PULM SYS VEL: 52.2 CM/SEC
BH CV ECHO MEAS - PVA(V,A): 2.9 CM^2
BH CV ECHO MEAS - PVA(V,D): 2.9 CM^2
BH CV ECHO MEAS - QP/QS: 0.79
BH CV ECHO MEAS - RV MAX PG: 2.9 MMHG
BH CV ECHO MEAS - RV MEAN PG: 1.9 MMHG
BH CV ECHO MEAS - RV V1 MAX: 85.3 CM/SEC
BH CV ECHO MEAS - RV V1 MEAN: 66.7 CM/SEC
BH CV ECHO MEAS - RV V1 VTI: 20.8 CM
BH CV ECHO MEAS - RVOT AREA: 4.3 CM^2
BH CV ECHO MEAS - RVOT DIAM: 2.3 CM
BH CV ECHO MEAS - SI(AO): 119.6 ML/M^2
BH CV ECHO MEAS - SI(CUBED): 21.3 ML/M^2
BH CV ECHO MEAS - SI(LVOT): 46.2 ML/M^2
BH CV ECHO MEAS - SI(MOD-SP2): 36.4 ML/M^2
BH CV ECHO MEAS - SI(MOD-SP4): 45.5 ML/M^2
BH CV ECHO MEAS - SI(TEICH): 19.2 ML/M^2
BH CV ECHO MEAS - SV(AO): 289 ML
BH CV ECHO MEAS - SV(CUBED): 51.3 ML
BH CV ECHO MEAS - SV(LVOT): 111.5 ML
BH CV ECHO MEAS - SV(MOD-SP2): 88 ML
BH CV ECHO MEAS - SV(MOD-SP4): 110 ML
BH CV ECHO MEAS - SV(RVOT): 88.5 ML
BH CV ECHO MEAS - SV(TEICH): 46.4 ML
BH CV ECHO MEAS - TR MAX VEL: 158.8 CM/SEC
BH CV ECHO MEASUREMENTS AVERAGE E/E' RATIO: 7.54
BH CV STRESS BP STAGE 1: NORMAL
BH CV STRESS BP STAGE 2: NORMAL
BH CV STRESS BP STAGE 3: NORMAL
BH CV STRESS DURATION MIN STAGE 1: 3
BH CV STRESS DURATION MIN STAGE 2: 3
BH CV STRESS DURATION MIN STAGE 3: 3
BH CV STRESS DURATION SEC STAGE 1: 0
BH CV STRESS DURATION SEC STAGE 2: 0
BH CV STRESS DURATION SEC STAGE 3: 0
BH CV STRESS ECHO POST STRESS EJECTION FRACTION EF: 83 %
BH CV STRESS GRADE STAGE 1: 10
BH CV STRESS GRADE STAGE 2: 12
BH CV STRESS GRADE STAGE 3: 14
BH CV STRESS HR STAGE 1: 109
BH CV STRESS HR STAGE 2: 133
BH CV STRESS HR STAGE 3: 158
BH CV STRESS METS STAGE 1: 5
BH CV STRESS METS STAGE 2: 7.5
BH CV STRESS METS STAGE 3: 10
BH CV STRESS PROTOCOL 1: NORMAL
BH CV STRESS SPEED STAGE 1: 1.7
BH CV STRESS SPEED STAGE 2: 2.5
BH CV STRESS SPEED STAGE 3: 3.4
BH CV STRESS STAGE 1: 1
BH CV STRESS STAGE 2: 2
BH CV STRESS STAGE 3: 3
BH CV XLRA - RV BASE: 3.7 CM
BH CV XLRA - RV LENGTH: 7 CM
BH CV XLRA - RV MID: 3.5 CM
BH CV XLRA - TDI S': 12.7 CM/SEC
LEFT ATRIUM VOLUME INDEX: 21 ML/M2
LV EF 2D ECHO EST: 69 %
MAXIMAL PREDICTED HEART RATE: 176 BPM
PERCENT MAX PREDICTED HR: 89.77 %
SINUS: 2.9 CM
STJ: 2.4 CM
STRESS BASELINE BP: NORMAL MMHG
STRESS BASELINE HR: 67 BPM
STRESS PERCENT HR: 106 %
STRESS POST ESTIMATED WORKLOAD: 10 METS
STRESS POST EXERCISE DUR MIN: 9 MIN
STRESS POST EXERCISE DUR SEC: 0 SEC
STRESS POST PEAK BP: NORMAL MMHG
STRESS POST PEAK HR: 158 BPM
STRESS TARGET HR: 150 BPM

## 2022-01-13 PROCEDURE — 93325 DOPPLER ECHO COLOR FLOW MAPG: CPT

## 2022-01-13 PROCEDURE — 93018 CV STRESS TEST I&R ONLY: CPT | Performed by: INTERNAL MEDICINE

## 2022-01-13 PROCEDURE — 93325 DOPPLER ECHO COLOR FLOW MAPG: CPT | Performed by: INTERNAL MEDICINE

## 2022-01-13 PROCEDURE — 93350 STRESS TTE ONLY: CPT | Performed by: INTERNAL MEDICINE

## 2022-01-13 PROCEDURE — 25010000002 PERFLUTREN (DEFINITY) 8.476 MG IN SODIUM CHLORIDE (PF) 0.9 % 10 ML INJECTION: Performed by: NURSE PRACTITIONER

## 2022-01-13 PROCEDURE — 93016 CV STRESS TEST SUPVJ ONLY: CPT | Performed by: INTERNAL MEDICINE

## 2022-01-13 PROCEDURE — 93350 STRESS TTE ONLY: CPT

## 2022-01-13 PROCEDURE — 93320 DOPPLER ECHO COMPLETE: CPT | Performed by: INTERNAL MEDICINE

## 2022-01-13 PROCEDURE — 93352 ADMIN ECG CONTRAST AGENT: CPT | Performed by: INTERNAL MEDICINE

## 2022-01-13 PROCEDURE — 93017 CV STRESS TEST TRACING ONLY: CPT

## 2022-01-13 PROCEDURE — 93320 DOPPLER ECHO COMPLETE: CPT

## 2022-01-13 RX ADMIN — PERFLUTREN 3 ML: 6.52 INJECTION, SUSPENSION INTRAVENOUS at 13:52

## 2022-01-13 NOTE — TELEPHONE ENCOUNTER
Left message with Dr. Herminia Simmons's office re [place]. Office number is [phone]./ ARNAUD

## 2022-01-13 NOTE — TELEPHONE ENCOUNTER
Reviewed blood work.  Called and discussed with patient.  No concern for diabetes with normal fasting glucose.  LDL is 74.  Discussed target LDL 70 or less.  He is cleared to continue exercising on his own with normal stress echo today.  He also stated he picked up Prevacid he will start that.  I advised that he take it for 2 weeks straight we will see if his chest pains improves.  He verbalized understanding

## 2022-01-14 ENCOUNTER — TELEPHONE (OUTPATIENT)
Dept: CARDIOLOGY | Facility: CLINIC | Age: 47
End: 2022-01-14

## 2022-01-14 ENCOUNTER — HOSPITAL ENCOUNTER (OUTPATIENT)
Dept: CT IMAGING | Facility: HOSPITAL | Age: 47
Discharge: HOME | End: 2022-01-14
Admitting: NURSE PRACTITIONER

## 2022-01-14 DIAGNOSIS — R07.89 CHEST PAIN, ATYPICAL: Primary | ICD-10-CM

## 2022-01-14 PROCEDURE — 0 IOPAMIDOL PER 1 ML: Performed by: NURSE PRACTITIONER

## 2022-01-14 PROCEDURE — 71275 CT ANGIOGRAPHY CHEST: CPT

## 2022-01-14 RX ADMIN — IOPAMIDOL 100 ML: 755 INJECTION, SOLUTION INTRAVENOUS at 17:32

## 2022-01-14 NOTE — TELEPHONE ENCOUNTER
1/14/22  Pt called she states he would like to discuss test because he is still having chest pain today.   He is worried that he has blockage.   Pt's call back [phone]  Thanks Mu MONTGOMERY

## 2022-01-14 NOTE — TELEPHONE ENCOUNTER
I called and spoke with patient.  We will arrange for CT of his chest to rule out PE at [facility].  This is closer to his house.  He is to continue Pepcid daily and if he has no PE and continues to have chest pain despite 1 to 2 weeks of Pepcid daily then we will proceed to cardiac catheterization.  He verbalized understanding

## 2022-01-24 ENCOUNTER — TELEPHONE (OUTPATIENT)
Dept: CARDIOLOGY | Facility: CLINIC | Age: 47
End: 2022-01-24

## 2022-01-24 NOTE — TELEPHONE ENCOUNTER
Called patient to follow up on CP. He has not used inhaler at all. He only has inhaler on standby. He had less chest pain overall but still reports intermittent chest discomfort. He is now thinking is may be a musculoskeletal. He is going to increase his physical activity at the gym.  We will give it another 2 weeks.  He understands that my plan is to proceed to cardiac catheterization laboratory if he continues to have issues.  He verbalized understanding

## 2022-02-01 ENCOUNTER — TELEPHONE (OUTPATIENT)
Dept: CARDIOLOGY | Facility: CLINIC | Age: 47
End: 2022-02-01

## 2022-02-01 NOTE — TELEPHONE ENCOUNTER
Triage please call to follow up with patient and how he is feeling. He had recent normal stress echo and unremarkable CTA chest. I started prevacid and his pain started to improve. His pain was not exertional, please verify again. I told him we would check on him in another week.

## 2022-02-01 NOTE — TELEPHONE ENCOUNTER
I recommend no additional testing. He should follow up with his PCP for musculoskeletal imaging in a couple weeks if still present

## 2022-03-01 ENCOUNTER — TELEPHONE (OUTPATIENT)
Dept: CARDIOLOGY | Facility: CLINIC | Age: 47
End: 2022-03-01

## 2022-03-01 NOTE — TELEPHONE ENCOUNTER
Patient called to report a side effect to one of his medications.  He states on 2/12/22 his face, neck and chest broke out in a rash and had tingling of his arms and legs.   He contacted his PCP and was advised to cut Lipitor 40 mg to 20 mg daily.  Patient states there was some improvement but not completely resolved. Patient has taken Lipitor for 2 years due to having two cardiac stents.  He has not started any new medications.  He tried to contact his PCP again but their system has been hacked and can't access any patient records. In addition, his PCP is out of office on vacation this week.  He was reaching out to us due to this issue.  Patient is aware that Za is out of office currently and Dr. Torres is in the hospital this week. He preferred to wait until she returns rather than involve another Provider./ ARNAUD     Patient can be reached at [phone]

## 2022-03-07 NOTE — TELEPHONE ENCOUNTER
Triage- please let him know I was out of the office all last week but was able to review this today. he can stop Lipitor 20 mg for a week to see if symptoms improve off of it. Otherwise, recommend getting back on lipitor 20 mg since he previously tolerated it.

## 2022-03-07 NOTE — TELEPHONE ENCOUNTER
Reviewed recommendations with Blake Dennis.   I asked patient to update office next week and he verbalized understanding.     Thank you,  Sheree Judd RN  Triage Nurse [facility]

## 2022-03-15 NOTE — TELEPHONE ENCOUNTER
"Patient called to report he has been off Lipitor since last Monday.  He states he has improvement in the \"cloudiness in his head\".  Blurred vision has almost resolved.  He still has intermittent chest and abdominal pain but this is improving as well since stopping the Lipitor.      Please advise what plan of action is next for patient. / ARNAUD        He can be reached at [phone]  "

## 2022-03-16 NOTE — TELEPHONE ENCOUNTER
Reviewed recommendations and plan of care with Blake Dennis.  Patient verbalized understanding.     Patient is scheduled to see you on 4/26.    Thank you,  Sheree Judd RN  Triage Nurse [facility]

## 2022-03-16 NOTE — TELEPHONE ENCOUNTER
Triage- advise that he continue off lipitor another 2-4 weeks. I added it to his allergy list. If symptoms persist in 2 weeks, I want him to touch base with his PCP.      He also needs to see me in 6 weeks and we can discuss if chest pain is still happening, possible cath as well as alternative statin therapy to replace lipitor but now is not the best time to do so since still have suspected symptoms from lipitor.

## 2022-03-29 ENCOUNTER — TRANSCRIBE ORDERS (OUTPATIENT)
Dept: ADMINISTRATIVE | Facility: HOSPITAL | Age: 47
End: 2022-03-29

## 2022-03-29 DIAGNOSIS — R06.00 DYSPNEA, UNSPECIFIED TYPE: ICD-10-CM

## 2022-03-29 DIAGNOSIS — Z01.818 OTHER SPECIFIED PRE-OPERATIVE EXAMINATION: ICD-10-CM

## 2022-03-29 DIAGNOSIS — I26.99 PULMONARY THROMBOSIS: Primary | ICD-10-CM

## 2022-04-05 ENCOUNTER — LAB (OUTPATIENT)
Dept: LAB | Facility: HOSPITAL | Age: 47
End: 2022-04-05

## 2022-04-05 DIAGNOSIS — Z01.818 OTHER SPECIFIED PRE-OPERATIVE EXAMINATION: ICD-10-CM

## 2022-04-05 LAB — SARS-COV-2 RNA PNL SPEC NAA+PROBE: NOT DETECTED

## 2022-04-05 PROCEDURE — C9803 HOPD COVID-19 SPEC COLLECT: HCPCS

## 2022-04-05 PROCEDURE — 87635 SARS-COV-2 COVID-19 AMP PRB: CPT | Performed by: INTERNAL MEDICINE

## 2022-04-07 ENCOUNTER — HOSPITAL ENCOUNTER (OUTPATIENT)
Dept: GENERAL RADIOLOGY | Facility: HOSPITAL | Age: 47
Discharge: HOME | End: 2022-04-07

## 2022-04-07 ENCOUNTER — TRANSCRIBE ORDERS (OUTPATIENT)
Dept: ADMINISTRATIVE | Facility: HOSPITAL | Age: 47
End: 2022-04-07

## 2022-04-07 ENCOUNTER — HOSPITAL ENCOUNTER (OUTPATIENT)
Dept: NUCLEAR MEDICINE | Facility: HOSPITAL | Age: 47
Discharge: HOME | End: 2022-04-07

## 2022-04-07 ENCOUNTER — APPOINTMENT (OUTPATIENT)
Dept: PULMONOLOGY | Facility: HOSPITAL | Age: 47
End: 2022-04-07

## 2022-04-07 ENCOUNTER — HOSPITAL ENCOUNTER (OUTPATIENT)
Dept: PULMONOLOGY | Facility: HOSPITAL | Age: 47
Discharge: HOME | End: 2022-04-07

## 2022-04-07 DIAGNOSIS — I26.99 PULMONARY THROMBOSIS: ICD-10-CM

## 2022-04-07 DIAGNOSIS — R06.00 DYSPNEA, UNSPECIFIED TYPE: ICD-10-CM

## 2022-04-07 DIAGNOSIS — R06.00 DYSPNEA, UNSPECIFIED TYPE: Primary | ICD-10-CM

## 2022-04-07 LAB
BDY SITE: ABNORMAL
HGB BLDA-MCNC: 14.9 G/DL (ref 14–18)
Lab: ABNORMAL
SAO2 % BLDCOA: 93.1 % (ref 94–99)

## 2022-04-07 PROCEDURE — 94726 PLETHYSMOGRAPHY LUNG VOLUMES: CPT

## 2022-04-07 PROCEDURE — 94010 BREATHING CAPACITY TEST: CPT

## 2022-04-07 PROCEDURE — 78582 LUNG VENTILAT&PERFUS IMAGING: CPT

## 2022-04-07 PROCEDURE — 78580 LUNG PERFUSION IMAGING: CPT

## 2022-04-07 PROCEDURE — 71045 X-RAY EXAM CHEST 1 VIEW: CPT

## 2022-04-07 PROCEDURE — A9540 TC99M MAA: HCPCS | Performed by: INTERNAL MEDICINE

## 2022-04-07 PROCEDURE — 94729 DIFFUSING CAPACITY: CPT

## 2022-04-07 PROCEDURE — 82820 HEMOGLOBIN-OXYGEN AFFINITY: CPT

## 2022-04-07 PROCEDURE — 0 TECHNETIUM ALBUMIN AGGREGATED: Performed by: INTERNAL MEDICINE

## 2022-04-07 RX ADMIN — KIT FOR THE PREPARATION OF TECHNETIUM TC 99M ALBUMIN AGGREGATED 1 DOSE: 2.5 INJECTION, POWDER, FOR SOLUTION INTRAVENOUS at 12:11

## 2022-04-11 RX ORDER — CLOPIDOGREL BISULFATE 75 MG/1
TABLET ORAL
Qty: 90 TABLET | Refills: 3 | Status: SHIPPED | OUTPATIENT
Start: 2022-04-11 | End: 2023-02-24

## 2022-04-18 RX ORDER — ATORVASTATIN CALCIUM 40 MG/1
TABLET, FILM COATED ORAL
Qty: 90 TABLET | Refills: 3 | OUTPATIENT
Start: 2022-04-18

## 2022-04-20 ENCOUNTER — TELEPHONE (OUTPATIENT)
Dept: CARDIOLOGY | Facility: CLINIC | Age: 47
End: 2022-04-20

## 2022-04-20 ENCOUNTER — HOSPITAL ENCOUNTER (OUTPATIENT)
Facility: HOSPITAL | Age: 47
Setting detail: OBSERVATION
Discharge: ANOTHER HOSPITAL | End: 2022-04-21
Attending: EMERGENCY MEDICINE | Admitting: HOSPITALIST

## 2022-04-20 DIAGNOSIS — R07.9 CHEST PAIN, UNSPECIFIED TYPE: Primary | ICD-10-CM

## 2022-04-20 LAB
BASOPHILS # BLD AUTO: 0.03 10*3/MM3 (ref 0–0.2)
BASOPHILS NFR BLD AUTO: 0.4 % (ref 0–1.5)
DEPRECATED RDW RBC AUTO: 39.9 FL (ref 37–54)
EOSINOPHIL # BLD AUTO: 0.07 10*3/MM3 (ref 0–0.4)
EOSINOPHIL NFR BLD AUTO: 0.8 % (ref 0.3–6.2)
ERYTHROCYTE [DISTWIDTH] IN BLOOD BY AUTOMATED COUNT: 12.4 % (ref 12.3–15.4)
HCT VFR BLD AUTO: 43.5 % (ref 37.5–51)
HGB BLD-MCNC: 14.4 G/DL (ref 13–17.7)
IMM GRANULOCYTES # BLD AUTO: 0.02 10*3/MM3 (ref 0–0.05)
IMM GRANULOCYTES NFR BLD AUTO: 0.2 % (ref 0–0.5)
LYMPHOCYTES # BLD AUTO: 1.83 10*3/MM3 (ref 0.7–3.1)
LYMPHOCYTES NFR BLD AUTO: 22 % (ref 19.6–45.3)
MCH RBC QN AUTO: 28.7 PG (ref 26.6–33)
MCHC RBC AUTO-ENTMCNC: 33.1 G/DL (ref 31.5–35.7)
MCV RBC AUTO: 86.7 FL (ref 79–97)
MONOCYTES # BLD AUTO: 0.72 10*3/MM3 (ref 0.1–0.9)
MONOCYTES NFR BLD AUTO: 8.7 % (ref 5–12)
NEUTROPHILS NFR BLD AUTO: 5.64 10*3/MM3 (ref 1.7–7)
NEUTROPHILS NFR BLD AUTO: 67.9 % (ref 42.7–76)
PLATELET # BLD AUTO: 315 10*3/MM3 (ref 140–450)
PMV BLD AUTO: 10.3 FL (ref 6–12)
RBC # BLD AUTO: 5.02 10*6/MM3 (ref 4.14–5.8)
WBC NRBC COR # BLD: 8.31 10*3/MM3 (ref 3.4–10.8)

## 2022-04-20 PROCEDURE — 85379 FIBRIN DEGRADATION QUANT: CPT | Performed by: EMERGENCY MEDICINE

## 2022-04-20 PROCEDURE — 93005 ELECTROCARDIOGRAM TRACING: CPT | Performed by: EMERGENCY MEDICINE

## 2022-04-20 PROCEDURE — 80053 COMPREHEN METABOLIC PANEL: CPT | Performed by: EMERGENCY MEDICINE

## 2022-04-20 PROCEDURE — 84484 ASSAY OF TROPONIN QUANT: CPT | Performed by: EMERGENCY MEDICINE

## 2022-04-20 PROCEDURE — 99284 EMERGENCY DEPT VISIT MOD MDM: CPT | Performed by: EMERGENCY MEDICINE

## 2022-04-20 PROCEDURE — 85025 COMPLETE CBC W/AUTO DIFF WBC: CPT | Performed by: EMERGENCY MEDICINE

## 2022-04-20 PROCEDURE — 93010 ELECTROCARDIOGRAM REPORT: CPT | Performed by: INTERNAL MEDICINE

## 2022-04-20 PROCEDURE — 99284 EMERGENCY DEPT VISIT MOD MDM: CPT

## 2022-04-20 PROCEDURE — 93005 ELECTROCARDIOGRAM TRACING: CPT

## 2022-04-20 RX ORDER — SODIUM CHLORIDE 0.9 % (FLUSH) 0.9 %
10 SYRINGE (ML) INJECTION AS NEEDED
Status: DISCONTINUED | OUTPATIENT
Start: 2022-04-20 | End: 2022-04-21 | Stop reason: HOSPADM

## 2022-04-20 NOTE — TELEPHONE ENCOUNTER
Spoke with NIKI Bernal. Patient was having chest and jaw pain both yesterday and today. I want him seen tomorrow 4/21 at 3:30 pm. I called patient left voicemail that I will see him at 3:30 tomorrow.     Scheduling- can you try to call him again with this time and confirm he can make it?     Roddy JOAQUIN if he calls voicemail he needs to confirm he can make it

## 2022-04-21 ENCOUNTER — APPOINTMENT (OUTPATIENT)
Dept: GENERAL RADIOLOGY | Facility: HOSPITAL | Age: 47
End: 2022-04-21

## 2022-04-21 ENCOUNTER — APPOINTMENT (OUTPATIENT)
Dept: CARDIOLOGY | Facility: HOSPITAL | Age: 47
End: 2022-04-21

## 2022-04-21 ENCOUNTER — HOSPITAL ENCOUNTER (OUTPATIENT)
Facility: HOSPITAL | Age: 47
Setting detail: HOSPITAL OUTPATIENT SURGERY
Discharge: HOME | End: 2022-04-21
Attending: INTERNAL MEDICINE | Admitting: INTERNAL MEDICINE

## 2022-04-21 VITALS
BODY MASS INDEX: 36.4 KG/M2 | HEART RATE: 45 BPM | RESPIRATION RATE: 16 BRPM | OXYGEN SATURATION: 97 % | WEIGHT: 260 LBS | HEIGHT: 71 IN | SYSTOLIC BLOOD PRESSURE: 114 MMHG | TEMPERATURE: 97 F | DIASTOLIC BLOOD PRESSURE: 77 MMHG

## 2022-04-21 VITALS
BODY MASS INDEX: 36.42 KG/M2 | SYSTOLIC BLOOD PRESSURE: 138 MMHG | TEMPERATURE: 97.9 F | HEIGHT: 71 IN | RESPIRATION RATE: 16 BRPM | DIASTOLIC BLOOD PRESSURE: 89 MMHG | WEIGHT: 260.14 LBS | OXYGEN SATURATION: 98 % | HEART RATE: 50 BPM

## 2022-04-21 PROBLEM — R07.9 CHEST PAIN: Status: ACTIVE | Noted: 2022-04-21

## 2022-04-21 LAB
ALBUMIN SERPL-MCNC: 4.5 G/DL (ref 3.5–5.2)
ALBUMIN/GLOB SERPL: 1.6 G/DL
ALP SERPL-CCNC: 43 U/L (ref 39–117)
ALT SERPL W P-5'-P-CCNC: 23 U/L (ref 1–41)
ANION GAP SERPL CALCULATED.3IONS-SCNC: 11.5 MMOL/L (ref 5–15)
ANION GAP SERPL CALCULATED.3IONS-SCNC: 8.6 MMOL/L (ref 5–15)
AORTIC DIMENSIONLESS INDEX: 0.83 (DI)
AST SERPL-CCNC: 27 U/L (ref 1–40)
BASOPHILS # BLD AUTO: 0.04 10*3/MM3 (ref 0–0.2)
BASOPHILS NFR BLD AUTO: 0.6 % (ref 0–1.5)
BH CV ECHO MEAS - AO MAX PG: 10.9 MMHG
BH CV ECHO MEAS - AO MEAN PG: 5.6 MMHG
BH CV ECHO MEAS - AO V2 MAX: 165.1 CM/SEC
BH CV ECHO MEAS - AO V2 VTI: 38 CM
BH CV ECHO MEAS - AVA(I,D): 3.1 CM2
BH CV ECHO MEAS - EDV(CUBED): 133.9 ML
BH CV ECHO MEAS - EDV(MOD-SP2): 133 ML
BH CV ECHO MEAS - EDV(MOD-SP4): 122 ML
BH CV ECHO MEAS - EF(MOD-BP): 61.7 %
BH CV ECHO MEAS - EF(MOD-SP2): 59.6 %
BH CV ECHO MEAS - EF(MOD-SP4): 62.8 %
BH CV ECHO MEAS - ESV(MOD-SP2): 53.7 ML
BH CV ECHO MEAS - ESV(MOD-SP4): 45.4 ML
BH CV ECHO MEAS - IVS/LVPW: 1.18 CM
BH CV ECHO MEAS - IVSD: 1.11 CM
BH CV ECHO MEAS - LAT PEAK E' VEL: 12.5 CM/SEC
BH CV ECHO MEAS - LV DIASTOLIC VOL/BSA (35-75): 51.8 CM2
BH CV ECHO MEAS - LV MASS(C)D: 195.6 GRAMS
BH CV ECHO MEAS - LV MAX PG: 9.8 MMHG
BH CV ECHO MEAS - LV MEAN PG: 4.2 MMHG
BH CV ECHO MEAS - LV SYSTOLIC VOL/BSA (12-30): 19.3 CM2
BH CV ECHO MEAS - LV V1 MAX: 156.5 CM/SEC
BH CV ECHO MEAS - LV V1 VTI: 32.6 CM
BH CV ECHO MEAS - LVIDD: 5.1 CM
BH CV ECHO MEAS - LVOT AREA: 3.6 CM2
BH CV ECHO MEAS - LVOT DIAM: 2.14 CM
BH CV ECHO MEAS - LVPWD: 0.94 CM
BH CV ECHO MEAS - MED PEAK E' VEL: 8.6 CM/SEC
BH CV ECHO MEAS - MV A MAX VEL: 53.9 CM/SEC
BH CV ECHO MEAS - MV DEC SLOPE: 686.1 CM/SEC2
BH CV ECHO MEAS - MV DEC TIME: 0.14 MSEC
BH CV ECHO MEAS - MV E MAX VEL: 91.2 CM/SEC
BH CV ECHO MEAS - MV E/A: 1.69
BH CV ECHO MEAS - MV MEAN PG: 1.41 MMHG
BH CV ECHO MEAS - MV V2 VTI: 34.6 CM
BH CV ECHO MEAS - MVA(VTI): 3.4 CM2
BH CV ECHO MEAS - PA ACC TIME: 0.14 SEC
BH CV ECHO MEAS - PA PR(ACCEL): 17.2 MMHG
BH CV ECHO MEAS - PA V2 MAX: 89.5 CM/SEC
BH CV ECHO MEAS - PULM A REVS DUR: 0.08 SEC
BH CV ECHO MEAS - PULM A REVS VEL: 24 CM/SEC
BH CV ECHO MEAS - PULM DIAS VEL: 65.7 CM/SEC
BH CV ECHO MEAS - PULM SYS VEL: 58.6 CM/SEC
BH CV ECHO MEAS - RAP SYSTOLE: 8 MMHG
BH CV ECHO MEAS - RV V1 VTI: 14.3 CM
BH CV ECHO MEAS - RVOT DIAM: 2.31 CM
BH CV ECHO MEAS - RVSP: 17 MMHG
BH CV ECHO MEAS - SI(MOD-SP2): 33.7 ML/M2
BH CV ECHO MEAS - SI(MOD-SP4): 32.5 ML/M2
BH CV ECHO MEAS - SV(LVOT): 116.9 ML
BH CV ECHO MEAS - SV(MOD-SP2): 79.3 ML
BH CV ECHO MEAS - SV(MOD-SP4): 76.6 ML
BH CV ECHO MEAS - SV(RVOT): 60.1 ML
BH CV ECHO MEAS - TAPSE (>1.6): 2.6 CM
BH CV ECHO MEAS - TR MAX PG: 9 MMHG
BH CV ECHO MEAS - TR MAX VEL: 149.6 CM/SEC
BH CV ECHO MEASUREMENTS AVERAGE E/E' RATIO: 8.64
BH CV XLRA - RV BASE: 3.7 CM
BH CV XLRA - RV LENGTH: 7.1 CM
BH CV XLRA - TDI S': 13.3 CM/SEC
BILIRUB SERPL-MCNC: 0.5 MG/DL (ref 0–1.2)
BUN SERPL-MCNC: 15 MG/DL (ref 6–20)
BUN SERPL-MCNC: 15 MG/DL (ref 6–20)
BUN/CREAT SERPL: 15.3 (ref 7–25)
BUN/CREAT SERPL: 16.3 (ref 7–25)
CALCIUM SPEC-SCNC: 9.2 MG/DL (ref 8.6–10.5)
CALCIUM SPEC-SCNC: 9.7 MG/DL (ref 8.6–10.5)
CHLORIDE SERPL-SCNC: 101 MMOL/L (ref 98–107)
CHLORIDE SERPL-SCNC: 106 MMOL/L (ref 98–107)
CHOLEST SERPL-MCNC: 169 MG/DL (ref 0–200)
CO2 SERPL-SCNC: 25.4 MMOL/L (ref 22–29)
CO2 SERPL-SCNC: 25.5 MMOL/L (ref 22–29)
CREAT SERPL-MCNC: 0.92 MG/DL (ref 0.76–1.27)
CREAT SERPL-MCNC: 0.98 MG/DL (ref 0.76–1.27)
D DIMER PPP FEU-MCNC: 0.28 MCGFEU/ML (ref 0–0.46)
DEPRECATED RDW RBC AUTO: 40 FL (ref 37–54)
EGFRCR SERPLBLD CKD-EPI 2021: 104.5 ML/MIN/1.73
EGFRCR SERPLBLD CKD-EPI 2021: 96.9 ML/MIN/1.73
EOSINOPHIL # BLD AUTO: 0.11 10*3/MM3 (ref 0–0.4)
EOSINOPHIL NFR BLD AUTO: 1.7 % (ref 0.3–6.2)
ERYTHROCYTE [DISTWIDTH] IN BLOOD BY AUTOMATED COUNT: 12.6 % (ref 12.3–15.4)
FLUAV RNA RESP QL NAA+PROBE: NOT DETECTED
FLUBV RNA RESP QL NAA+PROBE: NOT DETECTED
GLOBULIN UR ELPH-MCNC: 2.8 GM/DL
GLUCOSE SERPL-MCNC: 85 MG/DL (ref 65–99)
GLUCOSE SERPL-MCNC: 87 MG/DL (ref 65–99)
HBA1C MFR BLD: 4.9 % (ref 4.8–5.6)
HCT VFR BLD AUTO: 41.1 % (ref 37.5–51)
HDLC SERPL-MCNC: 33 MG/DL (ref 40–60)
HGB BLD-MCNC: 13.5 G/DL (ref 13–17.7)
HOLD SPECIMEN: NORMAL
HOLD SPECIMEN: NORMAL
IMM GRANULOCYTES # BLD AUTO: 0.02 10*3/MM3 (ref 0–0.05)
IMM GRANULOCYTES NFR BLD AUTO: 0.3 % (ref 0–0.5)
LDLC SERPL CALC-MCNC: 118 MG/DL (ref 0–100)
LDLC/HDLC SERPL: 3.55 {RATIO}
LEFT ATRIUM VOLUME INDEX: 23.9 ML/M2
LYMPHOCYTES # BLD AUTO: 1.72 10*3/MM3 (ref 0.7–3.1)
LYMPHOCYTES NFR BLD AUTO: 27.2 % (ref 19.6–45.3)
MAXIMAL PREDICTED HEART RATE: 175 BPM
MCH RBC QN AUTO: 28.7 PG (ref 26.6–33)
MCHC RBC AUTO-ENTMCNC: 32.8 G/DL (ref 31.5–35.7)
MCV RBC AUTO: 87.4 FL (ref 79–97)
MONOCYTES # BLD AUTO: 0.66 10*3/MM3 (ref 0.1–0.9)
MONOCYTES NFR BLD AUTO: 10.4 % (ref 5–12)
NEUTROPHILS NFR BLD AUTO: 3.77 10*3/MM3 (ref 1.7–7)
NEUTROPHILS NFR BLD AUTO: 59.8 % (ref 42.7–76)
NRBC BLD AUTO-RTO: 0 /100 WBC (ref 0–0.2)
PLATELET # BLD AUTO: 259 10*3/MM3 (ref 140–450)
PMV BLD AUTO: 10.7 FL (ref 6–12)
POTASSIUM SERPL-SCNC: 3.7 MMOL/L (ref 3.5–5.2)
POTASSIUM SERPL-SCNC: 4.4 MMOL/L (ref 3.5–5.2)
PROT SERPL-MCNC: 7.3 G/DL (ref 6–8.5)
QT INTERVAL: 417 MS
RBC # BLD AUTO: 4.7 10*6/MM3 (ref 4.14–5.8)
SARS-COV-2 RNA RESP QL NAA+PROBE: NOT DETECTED
SODIUM SERPL-SCNC: 138 MMOL/L (ref 136–145)
SODIUM SERPL-SCNC: 140 MMOL/L (ref 136–145)
STRESS TARGET HR: 149 BPM
TRIGL SERPL-MCNC: 95 MG/DL (ref 0–150)
TROPONIN T SERPL-MCNC: <0.01 NG/ML (ref 0–0.03)
TROPONIN T SERPL-MCNC: <0.01 NG/ML (ref 0–0.03)
TSH SERPL DL<=0.05 MIU/L-ACNC: 3.06 UIU/ML (ref 0.27–4.2)
VLDLC SERPL-MCNC: 18 MG/DL (ref 5–40)
WBC NRBC COR # BLD: 6.32 10*3/MM3 (ref 3.4–10.8)
WHOLE BLOOD HOLD SPECIMEN: NORMAL
WHOLE BLOOD HOLD SPECIMEN: NORMAL

## 2022-04-21 PROCEDURE — G0378 HOSPITAL OBSERVATION PER HR: HCPCS

## 2022-04-21 PROCEDURE — 83036 HEMOGLOBIN GLYCOSYLATED A1C: CPT | Performed by: HOSPITALIST

## 2022-04-21 PROCEDURE — 85025 COMPLETE CBC W/AUTO DIFF WBC: CPT | Performed by: HOSPITALIST

## 2022-04-21 PROCEDURE — 25010000002 MIDAZOLAM PER 1 MG: Performed by: INTERNAL MEDICINE

## 2022-04-21 PROCEDURE — 99152 MOD SED SAME PHYS/QHP 5/>YRS: CPT | Performed by: INTERNAL MEDICINE

## 2022-04-21 PROCEDURE — C1769 GUIDE WIRE: HCPCS | Performed by: INTERNAL MEDICINE

## 2022-04-21 PROCEDURE — C1894 INTRO/SHEATH, NON-LASER: HCPCS | Performed by: INTERNAL MEDICINE

## 2022-04-21 PROCEDURE — 87636 SARSCOV2 & INF A&B AMP PRB: CPT | Performed by: EMERGENCY MEDICINE

## 2022-04-21 PROCEDURE — 93306 TTE W/DOPPLER COMPLETE: CPT

## 2022-04-21 PROCEDURE — 25010000002 HEPARIN (PORCINE) PER 1000 UNITS: Performed by: INTERNAL MEDICINE

## 2022-04-21 PROCEDURE — 80061 LIPID PANEL: CPT | Performed by: HOSPITALIST

## 2022-04-21 PROCEDURE — 99214 OFFICE O/P EST MOD 30 MIN: CPT | Performed by: INTERNAL MEDICINE

## 2022-04-21 PROCEDURE — 25010000002 PERFLUTREN (DEFINITY) 8.476 MG IN SODIUM CHLORIDE (PF) 0.9 % 10 ML INJECTION: Performed by: HOSPITALIST

## 2022-04-21 PROCEDURE — 0 IOPAMIDOL PER 1 ML: Performed by: INTERNAL MEDICINE

## 2022-04-21 PROCEDURE — 93458 L HRT ARTERY/VENTRICLE ANGIO: CPT | Performed by: INTERNAL MEDICINE

## 2022-04-21 PROCEDURE — 93306 TTE W/DOPPLER COMPLETE: CPT | Performed by: INTERNAL MEDICINE

## 2022-04-21 PROCEDURE — 84443 ASSAY THYROID STIM HORMONE: CPT | Performed by: HOSPITALIST

## 2022-04-21 PROCEDURE — 71046 X-RAY EXAM CHEST 2 VIEWS: CPT

## 2022-04-21 PROCEDURE — 25010000002 FENTANYL CITRATE (PF) 50 MCG/ML SOLUTION: Performed by: INTERNAL MEDICINE

## 2022-04-21 PROCEDURE — 99235 HOSP IP/OBS SAME DATE MOD 70: CPT | Performed by: HOSPITALIST

## 2022-04-21 PROCEDURE — 84484 ASSAY OF TROPONIN QUANT: CPT | Performed by: HOSPITALIST

## 2022-04-21 PROCEDURE — 80048 BASIC METABOLIC PNL TOTAL CA: CPT | Performed by: HOSPITALIST

## 2022-04-21 RX ORDER — MIDAZOLAM HYDROCHLORIDE 1 MG/ML
INJECTION INTRAMUSCULAR; INTRAVENOUS AS NEEDED
Status: DISCONTINUED | OUTPATIENT
Start: 2022-04-21 | End: 2022-04-21 | Stop reason: HOSPADM

## 2022-04-21 RX ORDER — ASPIRIN 81 MG/1
324 TABLET, CHEWABLE ORAL ONCE
Status: COMPLETED | OUTPATIENT
Start: 2022-04-21 | End: 2022-04-21

## 2022-04-21 RX ORDER — SODIUM CHLORIDE 0.9 % (FLUSH) 0.9 %
10 SYRINGE (ML) INJECTION EVERY 12 HOURS SCHEDULED
Status: DISCONTINUED | OUTPATIENT
Start: 2022-04-21 | End: 2022-04-21 | Stop reason: HOSPADM

## 2022-04-21 RX ORDER — SODIUM CHLORIDE 0.9 % (FLUSH) 0.9 %
10 SYRINGE (ML) INJECTION AS NEEDED
Status: DISCONTINUED | OUTPATIENT
Start: 2022-04-21 | End: 2022-04-21 | Stop reason: HOSPADM

## 2022-04-21 RX ORDER — SODIUM CHLORIDE 9 MG/ML
40 INJECTION, SOLUTION INTRAVENOUS AS NEEDED
Status: DISCONTINUED | OUTPATIENT
Start: 2022-04-21 | End: 2022-04-21 | Stop reason: HOSPADM

## 2022-04-21 RX ORDER — NITROGLYCERIN 0.4 MG/1
0.4 TABLET SUBLINGUAL
Status: DISCONTINUED | OUTPATIENT
Start: 2022-04-21 | End: 2022-04-21 | Stop reason: HOSPADM

## 2022-04-21 RX ORDER — SODIUM CHLORIDE 9 MG/ML
75 INJECTION, SOLUTION INTRAVENOUS CONTINUOUS
Status: DISCONTINUED | OUTPATIENT
Start: 2022-04-21 | End: 2022-04-21 | Stop reason: HOSPADM

## 2022-04-21 RX ORDER — CLOPIDOGREL BISULFATE 75 MG/1
75 TABLET ORAL DAILY
Status: DISCONTINUED | OUTPATIENT
Start: 2022-04-21 | End: 2022-04-21 | Stop reason: HOSPADM

## 2022-04-21 RX ORDER — FENTANYL CITRATE 50 UG/ML
INJECTION, SOLUTION INTRAMUSCULAR; INTRAVENOUS AS NEEDED
Status: DISCONTINUED | OUTPATIENT
Start: 2022-04-21 | End: 2022-04-21 | Stop reason: HOSPADM

## 2022-04-21 RX ORDER — ACETAMINOPHEN 325 MG/1
650 TABLET ORAL EVERY 4 HOURS PRN
Status: DISCONTINUED | OUTPATIENT
Start: 2022-04-21 | End: 2022-04-21 | Stop reason: HOSPADM

## 2022-04-21 RX ORDER — PANTOPRAZOLE SODIUM 40 MG/1
40 TABLET, DELAYED RELEASE ORAL DAILY
Qty: 90 TABLET | Refills: 0 | Status: SHIPPED | OUTPATIENT
Start: 2022-04-21 | End: 2022-12-08

## 2022-04-21 RX ORDER — ASPIRIN 81 MG/1
81 TABLET, CHEWABLE ORAL DAILY
Status: DISCONTINUED | OUTPATIENT
Start: 2022-04-21 | End: 2022-04-21 | Stop reason: HOSPADM

## 2022-04-21 RX ORDER — LIDOCAINE HYDROCHLORIDE 20 MG/ML
INJECTION, SOLUTION INFILTRATION; PERINEURAL AS NEEDED
Status: DISCONTINUED | OUTPATIENT
Start: 2022-04-21 | End: 2022-04-21 | Stop reason: HOSPADM

## 2022-04-21 RX ADMIN — SODIUM CHLORIDE 75 ML/HR: 9 INJECTION, SOLUTION INTRAVENOUS at 12:10

## 2022-04-21 RX ADMIN — ASPIRIN 81 MG CHEWABLE TABLET 324 MG: 81 TABLET CHEWABLE at 00:05

## 2022-04-21 RX ADMIN — SODIUM CHLORIDE 2 ML: 9 INJECTION INTRAMUSCULAR; INTRAVENOUS; SUBCUTANEOUS at 09:57

## 2022-04-21 RX ADMIN — ASPIRIN 81 MG CHEWABLE TABLET 81 MG: 81 TABLET CHEWABLE at 08:30

## 2022-04-21 RX ADMIN — Medication 10 ML: at 10:10

## 2022-04-21 NOTE — PLAN OF CARE
Goal Outcome Evaluation:  Plan of Care Reviewed With: patient        Progress: no change  Outcome Evaluation: New admit, VSS, Tele SB, rested well after admit to the floor, NPO this AM

## 2022-04-21 NOTE — DISCHARGE SUMMARY
Blake Dennis  1975  [id number]    Hospitalists Discharge Summary    Date of Admission: 4/20/2022  Date of Discharge:  4/21/2022    Primary Discharge Diagnoses:  1.  Atypical Chest Pain  2.  Obesity Body mass index is 36.28 kg/m².    Secondary Discharge Diagnoses:  1.  CAD    History of Present Illness (taken from H&P):    Blake Dennis is a 47 y.o. male who presented to [facility] on 4/20/2022 complaint of chest pain, retrosternal, radiating to both shoulders, off-and-on for last few months.  Patient had a stress test earlier in the year which was negative for ischemia.  Patient carries medical history of coronary artery disease with a stent in LAD almost 2 years ago.  Patient says he does exercise almost 4-5 times in a week, aerobic in nature.  He do not get pain in chest with exercise.  It just to have any correlation but chest pain can last up to the 2 hours.  Patient has seen the family physician.  Today he said when he took the nitro with chest pain, he realized the pain is getting better, he got concerned and he came into the ER.  Following this we were asked to admit the patient for the further care.  Initial EKG and cardia markers unremarkable.    Hospital Course:  Mr. Dennis was admitted to the Med/Surg unit.  AMI was excluded by serial biomarkers.  He was seen in consultation by Cardiology and felt he needed to undergo coronary angiogram.  I held his dose of Lopressor this morning due to bradycardia in the 40's.      PCP  Patient Care Team:  Herminia Simmons APRN as PCP - General (Family Medicine)    Consults:   Consults     Date and Time Order Name Status Description    4/21/2022  5:01 AM Inpatient Cardiology Consult Completed           Operations and Procedures Performed:       Pulmonary Function Test (Transcribed)    Result Date: 4/10/2022  Narrative: Spirometry No evidence of any obstruction Lung volume study Normal lung volume study with no evidence of hyperinflation or air  trapping noted Normal diffusion capacity flow volume loop does not reflect any obstruction    XR Chest 2 View    Result Date: 4/21/2022  Narrative: CR Chest 2 Vws INDICATION:  Chronic chest pain. COMPARISON:  4/7/2022 FINDINGS: PA and lateral views of the chest.  Heart and mediastinal contours are normal. The lungs are clear. No pneumothorax or pleural effusion.      Impression: No acute cardiopulmonary findings. Signer Name: Ry Mosher MD  Signed: 4/21/2022 1:05 AM  Workstation Name: [facility]  [facility] Lung Ventilation Perfusion    Result Date: 4/7/2022  Narrative: EXAM: PERFUSION STUDY LUNGS 04/07/2022    HISTORY: Chest pain since November thousand 21. Cardiac stents have been placed in the past  DOSE: 5.6 mCi technetium 99m MAA  COMPARISON: Chest x-ray done the day  FINDINGS: After administration of pharmaceutical, images of the chest were obtained in multiple projections. There are no perfusion defects. Study appears normal.  Ventilation study was not performed because of Covid precautions  Today's chest x-ray is normal      Impression: Normal perfusion study of the lungs.  This report was finalized on 4/7/2022 12:39 PM by Dr. Richard Bermudez MD.      XR Chest 1 View    Result Date: 4/7/2022  Narrative: AP PORTABLE CHEST, 04/07/2022  HISTORY: Chest pain since November 2021  COMPARISON: 01/10/2022  TECHNIQUE: AP portable chest x-ray.  FINDINGS: Heart size and pulmonary vascularity are normal. The lungs are expanded and clear. No visible pulmonary infiltrate or pleural effusion.       Impression: Negative single view chest  This report was finalized on 4/7/2022 12:40 PM by Dr. Richard Bermudez MD.        Allergies:  is allergic to atorvastatin.    Uzair  reviewed    Discharge Medications:     Discharge Medications      Continue These Medications      Instructions Start Date   aspirin 81 MG chewable tablet   81 mg, Oral, Daily      clopidogrel 75 MG tablet  Commonly known as: PLAVIX    TAKE 1 TABLET DAILY      metoprolol tartrate 25 MG tablet  Commonly known as: LOPRESSOR   TAKE 1 TABLET TWICE A DAY      nitroglycerin 0.4 MG SL tablet  Commonly known as: NITROSTAT   0.4 mg, Sublingual, Every 5 Minutes PRN, Place one tablet under tongue as needed for chest pain.             Last Lab Results:   Lab Results (most recent)     Procedure Component Value Units Date/Time    TSH [[id number]]  (Normal) Collected: 04/21/22 0607    Specimen: Blood Updated: 04/21/22 0754     TSH 3.060 uIU/mL     Troponin [[id number]]  (Normal) Collected: 04/21/22 0607    Specimen: Blood Updated: 04/21/22 0636     Troponin T <0.010 ng/mL     Narrative:      Troponin T Reference Range:  <= 0.03 ng/mL-   Negative for AMI  >0.03 ng/mL-     Abnormal for myocardial necrosis.  Clinicians would have to utilize clinical acumen, EKG, Troponin and serial changes to determine if it is an Acute Myocardial Infarction or myocardial injury due to an underlying chronic condition.       Results may be falsely decreased if patient taking Biotin.      Basic Metabolic Panel [[id number]]  (Normal) Collected: 04/21/22 0607    Specimen: Blood Updated: 04/21/22 0634     Glucose 85 mg/dL      BUN 15 mg/dL      Creatinine 0.92 mg/dL      Sodium 140 mmol/L      Potassium 3.7 mmol/L      Chloride 106 mmol/L      CO2 25.4 mmol/L      Calcium 9.2 mg/dL      BUN/Creatinine Ratio 16.3     Anion Gap 8.6 mmol/L      eGFR 104.5 mL/min/1.73      Comment: National Kidney Foundation and American Society of Nephrology (ASN) Task Force recommended calculation based on the Chronic Kidney Disease Epidemiology Collaboration (CKD-EPI) equation refit without adjustment for race.       Narrative:      GFR Normal >60  Chronic Kidney Disease <60  Kidney Failure <15      Lipid Panel [[id number]]  (Abnormal) Collected: 04/21/22 0607    Specimen: Blood Updated: 04/21/22 0634     Total Cholesterol 169 mg/dL      Triglycerides 95 mg/dL      HDL Cholesterol 33 mg/dL      LDL  Cholesterol  118 mg/dL      VLDL Cholesterol 18 mg/dL      LDL/HDL Ratio 3.55    Narrative:      Cholesterol Reference Ranges  (U.S. Department of Health and Human Services ATP III Classifications)    Desirable          <200 mg/dL  Borderline High    200-239 mg/dL  High Risk          >240 mg/dL      Triglyceride Reference Ranges  (U.S. Department of Health and Human Services ATP III Classifications)    Normal           <150 mg/dL  Borderline High  150-199 mg/dL  High             200-499 mg/dL  Very High        >500 mg/dL    HDL Reference Ranges  (U.S. Department of Health and Human Services ATP III Classifications)    Low     <40 mg/dl (major risk factor for CHD)  High    >60 mg/dl ('negative' risk factor for CHD)        LDL Reference Ranges  (U.S. Department of Health and Human Services ATP III Classifications)    Optimal          <100 mg/dL  Near Optimal     100-129 mg/dL  Borderline High  130-159 mg/dL  High             160-189 mg/dL  Very High        >189 mg/dL    Hemoglobin A1c [[id number]]  (Normal) Collected: 04/21/22 0607    Specimen: Blood Updated: 04/21/22 0629     Hemoglobin A1C 4.90 %     Narrative:      Hemoglobin A1C Ranges:    Increased Risk for Diabetes  5.7% to 6.4%  Diabetes                     >= 6.5%  Diabetic Goal                < 7.0%    CBC & Differential [[id number]]  (Normal) Collected: 04/21/22 0607    Specimen: Blood Updated: 04/21/22 0615    Narrative:      The following orders were created for panel order CBC & Differential.  Procedure                               Abnormality         Status                     ---------                               -----------         ------                     CBC Auto Differential[[id number]]        Normal              Final result                 Please view results for these tests on the individual orders.    CBC Auto Differential [[id number]]  (Normal) Collected: 04/21/22 0607    Specimen: Blood Updated: 04/21/22 0615     WBC 6.32 10*3/mm3      RBC  4.70 10*6/mm3      Hemoglobin 13.5 g/dL      Hematocrit 41.1 %      MCV 87.4 fL      MCH 28.7 pg      MCHC 32.8 g/dL      RDW 12.6 %      RDW-SD 40.0 fl      MPV 10.7 fL      Platelets 259 10*3/mm3      Neutrophil % 59.8 %      Lymphocyte % 27.2 %      Monocyte % 10.4 %      Eosinophil % 1.7 %      Basophil % 0.6 %      Immature Grans % 0.3 %      Neutrophils, Absolute 3.77 10*3/mm3      Lymphocytes, Absolute 1.72 10*3/mm3      Monocytes, Absolute 0.66 10*3/mm3      Eosinophils, Absolute 0.11 10*3/mm3      Basophils, Absolute 0.04 10*3/mm3      Immature Grans, Absolute 0.02 10*3/mm3      nRBC 0.0 /100 WBC     COVID-19 and FLU A/B PCR - Swab, Nasopharynx [[id number]]  (Normal) Collected: 04/21/22 0122    Specimen: Swab from Nasopharynx Updated: 04/21/22 0144     COVID19 Not Detected     Influenza A PCR Not Detected     Influenza B PCR Not Detected    Narrative:      Fact sheet for providers: https://www.fda.gov/media/561339/download    Fact sheet for patients: https://www.fda.gov/media/254971/download    Test performed by PCR.    Bells Draw [[id number]] Collected: 04/20/22 2351    Specimen: Blood Updated: 04/21/22 0102    Narrative:      The following orders were created for panel order Bells Draw.  Procedure                               Abnormality         Status                     ---------                               -----------         ------                     Green Top (Gel)[[id number]]                                  Final result               Lavender Top[[id number]]                                     Final result               Gold Top - SST[[id number]]                                   Final result               Light Blue Top[[id number]]                                   Final result                 Please view results for these tests on the individual orders.    Green Top (Gel) [[id number]] Collected: 04/20/22 2351    Specimen: Blood Updated: 04/21/22 0102     Extra Tube Hold for add-ons.     Comment:  Auto resulted.       Lavender Top [[id number]] Collected: 04/20/22 2351    Specimen: Blood Updated: 04/21/22 0102     Extra Tube hold for add-on     Comment: Auto resulted       Gold Top - SST [[id number]] Collected: 04/20/22 2351    Specimen: Blood Updated: 04/21/22 0102     Extra Tube Hold for add-ons.     Comment: Auto resulted.       Light Blue Top [[id number]] Collected: 04/20/22 2351    Specimen: Blood Updated: 04/21/22 0102     Extra Tube hold for add-on     Comment: Auto resulted       Troponin [[id number]]  (Normal) Collected: 04/20/22 2351    Specimen: Blood Updated: 04/21/22 0036     Troponin T <0.010 ng/mL     Narrative:      Troponin T Reference Range:  <= 0.03 ng/mL-   Negative for AMI  >0.03 ng/mL-     Abnormal for myocardial necrosis.  Clinicians would have to utilize clinical acumen, EKG, Troponin and serial changes to determine if it is an Acute Myocardial Infarction or myocardial injury due to an underlying chronic condition.       Results may be falsely decreased if patient taking Biotin.      Comprehensive Metabolic Panel [[id number]] Collected: 04/20/22 2351    Specimen: Blood Updated: 04/21/22 0020     Glucose 87 mg/dL      BUN 15 mg/dL      Creatinine 0.98 mg/dL      Sodium 138 mmol/L      Potassium 4.4 mmol/L      Comment: Slight hemolysis detected by analyzer. Results may be affected.        Chloride 101 mmol/L      CO2 25.5 mmol/L      Calcium 9.7 mg/dL      Total Protein 7.3 g/dL      Albumin 4.50 g/dL      ALT (SGPT) 23 U/L      AST (SGOT) 27 U/L      Alkaline Phosphatase 43 U/L      Total Bilirubin 0.5 mg/dL      Globulin 2.8 gm/dL      A/G Ratio 1.6 g/dL      BUN/Creatinine Ratio 15.3     Anion Gap 11.5 mmol/L      eGFR 96.9 mL/min/1.73      Comment: National Kidney Foundation and American Society of Nephrology (ASN) Task Force recommended calculation based on the Chronic Kidney Disease Epidemiology Collaboration (CKD-EPI) equation refit without adjustment for race.       Narrative:       GFR Normal >60  Chronic Kidney Disease <60  Kidney Failure <15      D-dimer, Quantitative [[id number]]  (Normal) Collected: 04/20/22 2351    Specimen: Blood Updated: 04/21/22 0015     D-Dimer, Quantitative 0.28 MCGFEU/mL     Narrative:      Can be elevated in, but is not diagnostic for deep vein thrombosis (DVT) or pulmonary embolis (PE).  It is also elevated in other medical conditions.  Clinical correlation is required.  The negative cut-off value for the D-Dimer is 0.50 mcg FEU/mL for DVT and PE.      CBC & Differential [[id number]]  (Normal) Collected: 04/20/22 2351    Specimen: Blood Updated: 04/20/22 2359    Narrative:      The following orders were created for panel order CBC & Differential.  Procedure                               Abnormality         Status                     ---------                               -----------         ------                     CBC Auto Differential[[id number]]        Normal              Final result                 Please view results for these tests on the individual orders.    CBC Auto Differential [[id number]]  (Normal) Collected: 04/20/22 2351    Specimen: Blood Updated: 04/20/22 2359     WBC 8.31 10*3/mm3      RBC 5.02 10*6/mm3      Hemoglobin 14.4 g/dL      Hematocrit 43.5 %      MCV 86.7 fL      MCH 28.7 pg      MCHC 33.1 g/dL      RDW 12.4 %      RDW-SD 39.9 fl      MPV 10.3 fL      Platelets 315 10*3/mm3      Neutrophil % 67.9 %      Lymphocyte % 22.0 %      Monocyte % 8.7 %      Eosinophil % 0.8 %      Basophil % 0.4 %      Immature Grans % 0.2 %      Neutrophils, Absolute 5.64 10*3/mm3      Lymphocytes, Absolute 1.83 10*3/mm3      Monocytes, Absolute 0.72 10*3/mm3      Eosinophils, Absolute 0.07 10*3/mm3      Basophils, Absolute 0.03 10*3/mm3      Immature Grans, Absolute 0.02 10*3/mm3         Imaging Results (Most Recent)     Procedure Component Value Units Date/Time    XR Chest 2 View [[id number]] Collected: 04/21/22 0105     Updated: 04/21/22 0108    Narrative:       CR Chest 2 Vws    INDICATION:    Chronic chest pain.    COMPARISON:    4/7/2022    FINDINGS:   PA and lateral views of the chest.  Heart and mediastinal contours are normal. The lungs are clear. No pneumothorax or pleural effusion.        Impression:      No acute cardiopulmonary findings.    Signer Name: Ry Mosher MD   Signed: 4/21/2022 1:05 AM   Workstation Name: [facility]    [facility]          PROCEDURES      Condition on Discharge:  Stable    Physical Exam at Discharge  Vital Signs  Temp:  [96.9 °F (36.1 °C)-98.2 °F (36.8 °C)] 96.9 °F (36.1 °C)  Heart Rate:  [50-78] 78  Resp:  [16-20] 16  BP: ()/(61-86) 94/61    Physical Exam:  Physical Exam   Constitutional: Patient appears well-developed and well-nourished and in no acute distress   Cardiovascular: Regular rate, regular rhythm, S1 normal and S2 normal.  Exam reveals no gallop and no friction rub.  No murmur heard.  Pulmonary/Chest: Lungs are clear to auscultation bilaterally. No respiratory distress. No wheezes. No rhonchi. No rales.   Abdominal: Obese. Soft. Bowel sounds are normal. There is no tenderness.   Neurological: Cranial nerves II-XII are grossly intact with no focal deficits.    Discharge Disposition  [facility]    Visiting Nurse:    No     Home PT/OT:  No     Home Safety Evaluation:  No     DME  None    Discharge Diet:      Dietary Orders (From admission, onward)     Start     Ordered    04/20/22 2350  NPO Diet NPO Type: Strict NPO  (Chest Pain > 35 (Standing Order))  Diet Effective Now        Question:  NPO Type  Answer:  Strict NPO    04/20/22 2350                Activity at Discharge:  Bedrest      Follow-up Appointments  Future Appointments   Date Time Provider Department Center   4/21/2022  3:30 PM Za Irizarry, NIKI [facility] [place]         Test Results Pending at Discharge  None     Aram Mendes MD  04/21/22  08:51 EDT

## 2022-04-21 NOTE — CASE MANAGEMENT/SOCIAL WORK
Case Management Discharge Note      Final Note: Discharged to [facility] for cardiac cath.    Provided Post Acute Provider List?: Refused  Refused Provider List Comment: Offered community resources but patient declines the need for them at this time.    Selected Continued Care - Discharged on 4/21/2022 Admission date: 4/20/2022 - Discharge disposition: Short Term Hospital (DC - External)    Destination Coordination complete.    Service Provider Selected Services Address Phone Fax Patient Preferred    [facility] [address] [id number] [phone] -- --          Durable Medical Equipment    No services have been selected for the patient.              Dialysis/Infusion    No services have been selected for the patient.              Home Medical Care    No services have been selected for the patient.              Therapy    No services have been selected for the patient.              Community Resources    No services have been selected for the patient.              Community & DME    No services have been selected for the patient.                       Final Discharge Disposition Code: 02 - short [facility] hospital for  care

## 2022-04-21 NOTE — INTERVAL H&P NOTE
H&P reviewed. The patient was examined and there are no changes to the H&P. I have explained the risks and benefits of the procedure to the patient.  The patient understands and agrees to proceed

## 2022-04-21 NOTE — CASE MANAGEMENT/SOCIAL WORK
Discharge Planning Assessment  CAD[facility]     Patient Name: Blake Dennis  MRN: [id number]  Today's Date: 4/21/2022    Admit Date: 4/20/2022     Discharge Needs Assessment     Row Name 04/21/22 0908       Living Environment    People in Home spouse    Name(s) of People in Home Kaylene Dennis, wife    Current Living Arrangements home  two story house with one step to gain entry    Duration at Residence 16 M    Potentially Unsafe Housing Conditions --  None    Primary Care Provided by self    Provides Primary Care For no one    Caregiving Concerns No care giving concerns voiced by patient at this time.    Family Caregiver if Needed spouse    Family Caregiver Names [place] Maurice    Quality of Family Relationships unable to assess    Able to Return to Prior Arrangements yes    Living Arrangement Comments Patient states he lives with his wife in a two story house with one step to gain entry       Resource/Environmental Concerns    Resource/Environmental Concerns none    Transportation Concerns none       Transition Planning    Patient/Family Anticipates Transition to home with family    Patient/Family Anticipated Services at Transition none    Transportation Anticipated family or friend will provide  pt states wife will be able to provide ride home at discharge       Discharge Needs Assessment    Readmission Within the Last 30 Days no previous admission in last 30 days    Current Outpatient/Agency/Support Group --  none    Equipment Currently Used at Home none    Concerns to be Addressed denies needs/concerns at this time;discharge planning    Concerns Comments no discharge needs voiced by patient at this time.    Anticipated Changes Related to Illness none    Equipment Needed After Discharge none    Outpatient/Agency/Support Group Needs --  pt states he will not need these services at discharge    Discharge Facility/Level of Care Needs --  pt states he will not need these services at discharge    Provided Post  "Acute Provider List? Refused    Refused Provider List Comment Offered community resources but patient declines the need for them at this time.    Patient's Choice of Community Agency(s) none    Current Discharge Risk --  none    Discharge Coordination/Progress Patient states he plans on returning home at discharge with his wife to help if needed, no discharge needs voiced by patient at this time.               Discharge Plan     Row Name 04/21/22 0913       Plan    Plan Home with wife    Patient/Family in Agreement with Plan yes    Plan Comments Into room and introduced self and role of CM. Discussed discharge disposition with patient at bedside. Patient is currently resting in bed with no complaints and states \"they are going to send me to [place] today for a heart cath\". Patient confirms that the info on his face sheet is correct and that he see's NIKI Bernal as PCP. He states that he uses [facility] in [place] and normally has no problem picking up or paying for his meds. He also states that he does not have a living will and declines information regarding one. Patient states he lives with his wife in a two story house with one step to gain entry and has no problem maneuvering the steps or within the home. He states that he is independent with his ADL's, works and drives but that his wife will be able to provide ride home at discharge. He also states that he currently does no use any DME at home and does not anticipate needing any equipment at discharge. Patient states he has not used home health at home and states he will not need this service at discharge. CM offered community resources such as home health, STR and LTC but patient declines the need for them at this time. Patient states he plans on returning home at after being diischarged from [facility] with his wife to help if needed, no discharge needs voiced by patient at this time. Patient had no other questions or concerns " regarding discharge plans. Name and number placed on white board in room. CM will continue to follow for needs.              Continued Care and Services - Admitted Since 4/20/2022    Coordination has not been started for this encounter.       Expected Discharge Date and Time     Expected Discharge Date Expected Discharge Time    Apr 21, 2022          Demographic Summary     Row Name 04/21/22 0907       General Information    Admission Type observation    Arrived From home    Referral Source admission list    Reason for Consult discharge planning       Contact Information    Permission Granted to Share Info With                Functional Status    No documentation.                Psychosocial    No documentation.                Abuse/Neglect    No documentation.                Legal    No documentation.                Substance Abuse    No documentation.                Patient Forms    No documentation.                   Rosalee Souza RN

## 2022-04-21 NOTE — H&P (VIEW-ONLY)
Patient Name: Blake Dennis  :1975  47 y.o.    Date of Admission: 2022  Date of Consultation:  22  Encounter Provider: Rogelio Lang III, MD  Place of Service: [facility] CARDIOLOGY  Referring Provider: No ref. provider found  Patient Care Team:  Herminia Simmons APRN as PCP - General (Family Medicine)      Chief complaint: Chest pain    History of Present Illness:    Pleasant 47-year-old male who presented with complaint of chest discomfort.    He has a history of CAD with prior stent placement in his LAD.    Patient relates that started in October he started having recurrent episodes of chest discomfort.  This is a dull precordial chest discomfort that radiates into his jaw, into his posterior left shoulder, sometimes into both arms.  When he gets that he feels weak.  No associated nausea or vomiting, no diaphoresis.    He is undergone substantial evaluation since October with no definition of the etiology of his chest discomfort.  He had a stress echocardiogram in January that showed no abnormality.  He has been seen in the emergency room and had both a CT angiogram and a VQ scan on different occasions that have been normal.  Serial EKGs have been normal.  Serial measurements of cardiac troponin have been unremarkable.  He has been treated with empiric PPI with no relief of or change in his discomfort.    Yesterday patient again was having chest discomfort.  He took a nitroglycerin and noted that the chest pain and jaw pain went away.  He then came to the emergency room and is admitted for further evaluation treatment.    Overnight he has had no further chest discomfort, EKG and troponin here are normal.    Patient does note that he had COVID in 2020 and then it was a couple of months after that when he was found to have the coronary artery disease.  He relates that he again got COVID in 2021 and it was about 6 weeks after that that he started  having the chest discomfort and he wonders whether the 2 could be related.    He has not noticed any correlation with activity or exercise.  He is active and exercises lose weight and lost about 30 pounds.    In April 2020 had abnormal stress echocardiogram with evidence of ischemia in the anterior wall.  He had preserved left-ventricular systolic function.  He proceeded to have a cardiac catheterization or is found to have a 70% mid LAD stenosis with a sequential 90% mid to distal segment stenosis.  There is a 30% stenosis in the circumflex.  He received 2 drug-eluting stents to the mid LAD.    Past Medical History:   Diagnosis Date   • BMI 38.0-38.9,adult    • CAD (coronary artery disease)    • Chest pain    • Elevated cholesterol    • Hypertension    • Sleep apnea        Past Surgical History:   Procedure Laterality Date   • CARDIAC CATHETERIZATION N/A 4/27/2020    Procedure: Coronary angiography;  Surgeon: Mj Anne MD;  Location: [facility] CATH INVASIVE LOCATION;  Service: Cardiovascular;  Laterality: N/A;   • CARDIAC CATHETERIZATION N/A 4/27/2020    Procedure: Left heart cath;  Surgeon: Mj Anne MD;  Location: [facility] CATH INVASIVE LOCATION;  Service: Cardiovascular;  Laterality: N/A;   • CARDIAC CATHETERIZATION N/A 4/27/2020    Procedure: Left ventriculography;  Surgeon: Mj Anne MD;  Location: [facility] CATH INVASIVE LOCATION;  Service: Cardiovascular;  Laterality: N/A;   • CARDIAC CATHETERIZATION N/A 4/27/2020    Procedure: Stent TANNER coronary;  Surgeon: Mj Anne MD;  Location: [facility] CATH INVASIVE LOCATION;  Service: Cardiovascular;  Laterality: N/A;         Prior to Admission medications    Medication Sig Start Date End Date Taking? Authorizing Provider   aspirin 81 MG chewable tablet Chew 1 tablet Daily. 5/14/20   Mj Anne MD   clopidogrel (PLAVIX) 75 MG tablet TAKE 1 TABLET DAILY 4/11/22   Bo Torres MD   metoprolol tartrate (LOPRESSOR) 25 MG tablet TAKE  "1 TABLET TWICE A DAY 4/11/22   Bo Torres MD   nitroglycerin (NITROSTAT) 0.4 MG SL tablet Place 1 tablet under the tongue Every 5 (Five) Minutes As Needed for Chest Pain. Place one tablet under tongue as needed for chest pain. 5/14/20   Mj Anne MD   lansoprazole (Prevacid) 30 MG capsule Take 1 capsule by mouth Daily. 1/12/22 4/21/22  Za Irizarry APRN       Allergies   Allergen Reactions   • Atorvastatin Dizziness      \"cloudiness in his head\".  Blurred vision and abdominal pain       Social History     Socioeconomic History   • Marital status:      Spouse name: Kaylene Dennis   • Number of children: 2   • Years of education: 12   • Highest education level: High school graduate   Tobacco Use   • Smoking status: Never Smoker   • Smokeless tobacco: Never Used   • Tobacco comment: caffeine use 1 cup coffee daily   Vaping Use   • Vaping Use: Never used   Substance and Sexual Activity   • Alcohol use: Not Currently     Alcohol/week: 6.0 standard drinks     Types: 6 Shots of liquor per week     Comment: very rarely   • Drug use: Never   • Sexual activity: Defer       Family History   Problem Relation Age of Onset   • Hypertension Mother    • Cancer Mother        REVIEW OF SYSTEMS:   All systems reviewed.  Pertinent positives identified in HPI.  All other systems are negative.      Objective:     Vitals:    04/21/22 0001 04/21/22 0031 04/21/22 0125 04/21/22 0526   BP: 121/83 117/82 128/82 94/61   BP Location:   Right arm Left arm   Patient Position:   Sitting Lying   Pulse: 50  53 78   Resp: 16  16 16   Temp:   97.5 °F (36.4 °C) 96.9 °F (36.1 °C)   TempSrc:   Oral Oral   SpO2: 96% 99% 100% 99%   Weight:   118 kg (260 lb 1.6 oz)    Height:   180.3 cm (71\")      Body mass index is 36.28 kg/m².    Physical Exam:  General Appearance:    Alert, cooperative, in no acute distress   Head:    Normocephalic, without obvious abnormality   Eyes:            Lids and lashes normal, conjunctivae and sclerae " normal, no icterus, no pallor, corneas clear   Ears:    Ears appear intact with no abnormalities noted   Throat:   No oral lesions, oral mucosa moist   Neck:   No adenopathy, supple, trachea midline, no thyromegaly, no carotid bruit, no JVD   Back:     No kyphosis present, no erythema or scars, no tenderness to palpation    Lungs:     Clear to auscultation,respirations regular, even and unlabored    Heart:    Regular rhythm and normal rate, normal S1 and S2, no murmur, no gallop, no rub, no click   Chest Wall:    No abnormalities observed   Abdomen:     Normal bowel sounds, no masses, no organomegaly, soft        non-tender, non-distended, no guarding   Extremities:   Moves all extremities well, no edema, no cyanosis, no redness   Pulses:  Bilateral carotids brisk   Skin:  Psychiatric:   No bleeding or rash    Alert and oriented, normal mood and affect         Lab Review:     Results from last 7 days   Lab Units 04/21/22  0607 04/20/22  2351   SODIUM mmol/L 140 138   POTASSIUM mmol/L 3.7 4.4   CHLORIDE mmol/L 106 101   CO2 mmol/L 25.4 25.5   BUN mg/dL 15 15   CREATININE mg/dL 0.92 0.98   CALCIUM mg/dL 9.2 9.7   BILIRUBIN mg/dL  --  0.5   ALK PHOS U/L  --  43   ALT (SGPT) U/L  --  23   AST (SGOT) U/L  --  27   GLUCOSE mg/dL 85 87     Results from last 7 days   Lab Units 04/21/22  0607 04/20/22  2351   TROPONIN T ng/mL <0.010 <0.010     Results from last 7 days   Lab Units 04/21/22  0607   WBC 10*3/mm3 6.32   HEMOGLOBIN g/dL 13.5   HEMATOCRIT % 41.1   PLATELETS 10*3/mm3 259             Results from last 7 days   Lab Units 04/21/22  0607   CHOLESTEROL mg/dL 169   TRIGLYCERIDES mg/dL 95   HDL CHOL mg/dL 33*   LDL CHOL mg/dL 118*               I personally viewed and interpreted the patient's EKG/Telemetry data.      Current Facility-Administered Medications:   •  aspirin chewable tablet 81 mg, 81 mg, Oral, Daily, Efrain Simon MD  •  clopidogrel (PLAVIX) tablet 75 mg, 75 mg, Oral, Daily, Efrain Simon  MD  •  metoprolol tartrate (LOPRESSOR) tablet 25 mg, 25 mg, Oral, BID, Efrain Simon MD  •  nitroglycerin (NITROSTAT) SL tablet 0.4 mg, 0.4 mg, Sublingual, Q5 Min PRN, Efrain Simon MD  •  sodium chloride 0.9 % flush 10 mL, 10 mL, Intravenous, PRN, Efrain Simon MD  •  sodium chloride 0.9 % flush 10 mL, 10 mL, Intravenous, PRN, Efrain Simon MD  •  sodium chloride 0.9 % flush 10 mL, 10 mL, Intravenous, Q12H, Efrain Simon MD  •  sodium chloride 0.9 % infusion 40 mL, 40 mL, Intravenous, PRN, Efrain Simon MD    Assessment and Plan:       Active Hospital Problems    Diagnosis  POA   • Chest pain [R07.9]  Yes   • Coronary artery disease involving native coronary artery of native heart without angina pectoris [I25.10]  Yes      Resolved Hospital Problems   No resolved problems to display.     1.  Coronary disease, status post 2 drug-eluting stents placed in LAD in April 2020  2.  Chest discomfort- somewhat atypical but he reports that it was nitrate responsive.  Patient's been having recurrent chest discomfort the last 6 months with no etiology found despite fairly comprehensive evaluation including stress echocardiogram, CT angiogram of the chest that was negative for PE, VQ scan on a separate occasion, and empiric PPI therapy.  Given that he was now admitted for recurrent chest pain radiating to jaw that was nitrate responsive I think were obligated to proceed with a diagnostic cardiac catheterization.  If that is unremarkable then I would favor referral to GI for evaluation of noncardiac etiologies of chest discomfort.    Rogelio Lang III, MD  04/21/22  08:30 EDT

## 2022-04-21 NOTE — CONSULTS
Patient Name: Blake Dennis  :1975  47 y.o.    Date of Admission: 2022  Date of Consultation:  22  Encounter Provider: Rogelio Lang III, MD  Place of Service: [facility] CARDIOLOGY  Referring Provider: No ref. provider found  Patient Care Team:  Herminia Simmons APRN as PCP - General (Family Medicine)      Chief complaint: Chest pain    History of Present Illness:    Pleasant 47-year-old male who presented with complaint of chest discomfort.    He has a history of CAD with prior stent placement in his LAD.    Patient relates that started in October he started having recurrent episodes of chest discomfort.  This is a dull precordial chest discomfort that radiates into his jaw, into his posterior left shoulder, sometimes into both arms.  When he gets that he feels weak.  No associated nausea or vomiting, no diaphoresis.    He is undergone substantial evaluation since October with no definition of the etiology of his chest discomfort.  He had a stress echocardiogram in January that showed no abnormality.  He has been seen in the emergency room and had both a CT angiogram and a VQ scan on different occasions that have been normal.  Serial EKGs have been normal.  Serial measurements of cardiac troponin have been unremarkable.  He has been treated with empiric PPI with no relief of or change in his discomfort.    Yesterday patient again was having chest discomfort.  He took a nitroglycerin and noted that the chest pain and jaw pain went away.  He then came to the emergency room and is admitted for further evaluation treatment.    Overnight he has had no further chest discomfort, EKG and troponin here are normal.    Patient does note that he had COVID in 2020 and then it was a couple of months after that when he was found to have the coronary artery disease.  He relates that he again got COVID in 2021 and it was about 6 weeks after that that he started  having the chest discomfort and he wonders whether the 2 could be related.    He has not noticed any correlation with activity or exercise.  He is active and exercises lose weight and lost about 30 pounds.    In April 2020 had abnormal stress echocardiogram with evidence of ischemia in the anterior wall.  He had preserved left-ventricular systolic function.  He proceeded to have a cardiac catheterization or is found to have a 70% mid LAD stenosis with a sequential 90% mid to distal segment stenosis.  There is a 30% stenosis in the circumflex.  He received 2 drug-eluting stents to the mid LAD.    Past Medical History:   Diagnosis Date   • BMI 38.0-38.9,adult    • CAD (coronary artery disease)    • Chest pain    • Elevated cholesterol    • Hypertension    • Sleep apnea        Past Surgical History:   Procedure Laterality Date   • CARDIAC CATHETERIZATION N/A 4/27/2020    Procedure: Coronary angiography;  Surgeon: Mj Anne MD;  Location: [facility] CATH INVASIVE LOCATION;  Service: Cardiovascular;  Laterality: N/A;   • CARDIAC CATHETERIZATION N/A 4/27/2020    Procedure: Left heart cath;  Surgeon: Mj Anne MD;  Location: [facility] CATH INVASIVE LOCATION;  Service: Cardiovascular;  Laterality: N/A;   • CARDIAC CATHETERIZATION N/A 4/27/2020    Procedure: Left ventriculography;  Surgeon: Mj Anne MD;  Location: [facility] CATH INVASIVE LOCATION;  Service: Cardiovascular;  Laterality: N/A;   • CARDIAC CATHETERIZATION N/A 4/27/2020    Procedure: Stent TANNER coronary;  Surgeon: Mj Anne MD;  Location: [facility] CATH INVASIVE LOCATION;  Service: Cardiovascular;  Laterality: N/A;         Prior to Admission medications    Medication Sig Start Date End Date Taking? Authorizing Provider   aspirin 81 MG chewable tablet Chew 1 tablet Daily. 5/14/20   Mj Anne MD   clopidogrel (PLAVIX) 75 MG tablet TAKE 1 TABLET DAILY 4/11/22   Bo Torres MD   metoprolol tartrate (LOPRESSOR) 25 MG tablet TAKE  "1 TABLET TWICE A DAY 4/11/22   Bo Torres MD   nitroglycerin (NITROSTAT) 0.4 MG SL tablet Place 1 tablet under the tongue Every 5 (Five) Minutes As Needed for Chest Pain. Place one tablet under tongue as needed for chest pain. 5/14/20   Mj Anne MD   lansoprazole (Prevacid) 30 MG capsule Take 1 capsule by mouth Daily. 1/12/22 4/21/22  Za Irizarry APRN       Allergies   Allergen Reactions   • Atorvastatin Dizziness      \"cloudiness in his head\".  Blurred vision and abdominal pain       Social History     Socioeconomic History   • Marital status:      Spouse name: Kaylene Dennis   • Number of children: 2   • Years of education: 12   • Highest education level: High school graduate   Tobacco Use   • Smoking status: Never Smoker   • Smokeless tobacco: Never Used   • Tobacco comment: caffeine use 1 cup coffee daily   Vaping Use   • Vaping Use: Never used   Substance and Sexual Activity   • Alcohol use: Not Currently     Alcohol/week: 6.0 standard drinks     Types: 6 Shots of liquor per week     Comment: very rarely   • Drug use: Never   • Sexual activity: Defer       Family History   Problem Relation Age of Onset   • Hypertension Mother    • Cancer Mother        REVIEW OF SYSTEMS:   All systems reviewed.  Pertinent positives identified in HPI.  All other systems are negative.      Objective:     Vitals:    04/21/22 0001 04/21/22 0031 04/21/22 0125 04/21/22 0526   BP: 121/83 117/82 128/82 94/61   BP Location:   Right arm Left arm   Patient Position:   Sitting Lying   Pulse: 50  53 78   Resp: 16  16 16   Temp:   97.5 °F (36.4 °C) 96.9 °F (36.1 °C)   TempSrc:   Oral Oral   SpO2: 96% 99% 100% 99%   Weight:   118 kg (260 lb 1.6 oz)    Height:   180.3 cm (71\")      Body mass index is 36.28 kg/m².    Physical Exam:  General Appearance:    Alert, cooperative, in no acute distress   Head:    Normocephalic, without obvious abnormality   Eyes:            Lids and lashes normal, conjunctivae and sclerae " normal, no icterus, no pallor, corneas clear   Ears:    Ears appear intact with no abnormalities noted   Throat:   No oral lesions, oral mucosa moist   Neck:   No adenopathy, supple, trachea midline, no thyromegaly, no carotid bruit, no JVD   Back:     No kyphosis present, no erythema or scars, no tenderness to palpation    Lungs:     Clear to auscultation,respirations regular, even and unlabored    Heart:    Regular rhythm and normal rate, normal S1 and S2, no murmur, no gallop, no rub, no click   Chest Wall:    No abnormalities observed   Abdomen:     Normal bowel sounds, no masses, no organomegaly, soft        non-tender, non-distended, no guarding   Extremities:   Moves all extremities well, no edema, no cyanosis, no redness   Pulses:  Bilateral carotids brisk   Skin:  Psychiatric:   No bleeding or rash    Alert and oriented, normal mood and affect         Lab Review:     Results from last 7 days   Lab Units 04/21/22  0607 04/20/22  2351   SODIUM mmol/L 140 138   POTASSIUM mmol/L 3.7 4.4   CHLORIDE mmol/L 106 101   CO2 mmol/L 25.4 25.5   BUN mg/dL 15 15   CREATININE mg/dL 0.92 0.98   CALCIUM mg/dL 9.2 9.7   BILIRUBIN mg/dL  --  0.5   ALK PHOS U/L  --  43   ALT (SGPT) U/L  --  23   AST (SGOT) U/L  --  27   GLUCOSE mg/dL 85 87     Results from last 7 days   Lab Units 04/21/22  0607 04/20/22  2351   TROPONIN T ng/mL <0.010 <0.010     Results from last 7 days   Lab Units 04/21/22  0607   WBC 10*3/mm3 6.32   HEMOGLOBIN g/dL 13.5   HEMATOCRIT % 41.1   PLATELETS 10*3/mm3 259             Results from last 7 days   Lab Units 04/21/22  0607   CHOLESTEROL mg/dL 169   TRIGLYCERIDES mg/dL 95   HDL CHOL mg/dL 33*   LDL CHOL mg/dL 118*               I personally viewed and interpreted the patient's EKG/Telemetry data.      Current Facility-Administered Medications:   •  aspirin chewable tablet 81 mg, 81 mg, Oral, Daily, Efrain Simon MD  •  clopidogrel (PLAVIX) tablet 75 mg, 75 mg, Oral, Daily, Efrain Simon  MD  •  metoprolol tartrate (LOPRESSOR) tablet 25 mg, 25 mg, Oral, BID, Efrain Simon MD  •  nitroglycerin (NITROSTAT) SL tablet 0.4 mg, 0.4 mg, Sublingual, Q5 Min PRN, Efrain Simon MD  •  sodium chloride 0.9 % flush 10 mL, 10 mL, Intravenous, PRN, Efrain Simon MD  •  sodium chloride 0.9 % flush 10 mL, 10 mL, Intravenous, PRN, Efrain Simon MD  •  sodium chloride 0.9 % flush 10 mL, 10 mL, Intravenous, Q12H, Efrain Simon MD  •  sodium chloride 0.9 % infusion 40 mL, 40 mL, Intravenous, PRN, Efrain Simon MD    Assessment and Plan:       Active Hospital Problems    Diagnosis  POA   • Chest pain [R07.9]  Yes   • Coronary artery disease involving native coronary artery of native heart without angina pectoris [I25.10]  Yes      Resolved Hospital Problems   No resolved problems to display.     1.  Coronary disease, status post 2 drug-eluting stents placed in LAD in April 2020  2.  Chest discomfort- somewhat atypical but he reports that it was nitrate responsive.  Patient's been having recurrent chest discomfort the last 6 months with no etiology found despite fairly comprehensive evaluation including stress echocardiogram, CT angiogram of the chest that was negative for PE, VQ scan on a separate occasion, and empiric PPI therapy.  Given that he was now admitted for recurrent chest pain radiating to jaw that was nitrate responsive I think were obligated to proceed with a diagnostic cardiac catheterization.  If that is unremarkable then I would favor referral to GI for evaluation of noncardiac etiologies of chest discomfort.    Rogelio Lang III, MD  04/21/22  08:30 EDT

## 2022-04-21 NOTE — ED PROVIDER NOTES
Subjective   History of Present Illness  History of Present Illness    Chief complaint: Chest pain    Location: Substernal, radiating to the jaw    Quality/Severity: 6/10 at its worst, 0/10 currently the pain is like his anginal equivalent.  Pressure type pain    Timing/Onset/Duration: 2 hours ago    Modifying Factors: Relieved with nitroglycerin    Associated Symptoms: No headache.  No fever chills or cough.  No sore throat earache or nasal congestion.  No nausea or vomiting.  No diaphoresis.  No abdominal pain.  No diarrhea or burning when he urinates.    Narrative: This 47-year-old male presents with chest pain.  The patient has a history of coronary artery disease and hypertension.  The patient had stents placed in 2020.  He had a stress test in February.  The patient has had increasing bouts of chest pain over the last 2 days.  The patient's had a baby aspirin today.  The patient is supposed to see Evelin Lovell, midlevel with local cardiology, tomorrow.    PCP: Herminia Simmons    Cardiology: [facility] [facility]    Review of Systems   Constitutional: Negative for chills, diaphoresis and fever.   HENT: Negative for ear pain and sore throat.    Respiratory: Negative for shortness of breath.    Cardiovascular: Positive for chest pain.   Gastrointestinal: Negative for abdominal pain, diarrhea, nausea and vomiting.   Genitourinary: Negative for difficulty urinating.   Musculoskeletal: Negative for back pain.   Skin: Negative for rash.   Neurological: Negative for headaches.        Medication List      ASK your doctor about these medications    aspirin 81 MG chewable tablet  Chew 1 tablet Daily.     clopidogrel 75 MG tablet  Commonly known as: PLAVIX  TAKE 1 TABLET DAILY     lansoprazole 30 MG capsule  Commonly known as: Prevacid  Take 1 capsule by mouth Daily.     metoprolol tartrate 25 MG tablet  Commonly known as: LOPRESSOR  TAKE 1 TABLET TWICE A DAY     nitroglycerin 0.4 MG SL tablet  Commonly known as:  "NITROSTAT  Place 1 tablet under the tongue Every 5 (Five) Minutes As Needed for Chest Pain. Place one tablet under tongue as needed for chest pain.            Past Medical History:   Diagnosis Date   • BMI 38.0-38.9,adult    • CAD (coronary artery disease)    • Chest pain    • Hypertension        Allergies   Allergen Reactions   • Atorvastatin Dizziness      \"cloudiness in his head\".  Blurred vision and abdominal pain       Past Surgical History:   Procedure Laterality Date   • CARDIAC CATHETERIZATION N/A 4/27/2020    Procedure: Coronary angiography;  Surgeon: Mj Anne MD;  Location:  [facility];  Service: Cardiovascular;  Laterality: N/A;   • CARDIAC CATHETERIZATION N/A 4/27/2020    Procedure: Left heart cath;  Surgeon: Mj Anne MD;  Location:  [facility];  Service: Cardiovascular;  Laterality: N/A;   • CARDIAC CATHETERIZATION N/A 4/27/2020    Procedure: Left ventriculography;  Surgeon: Mj Anne MD;  Location:  [facility];  Service: Cardiovascular;  Laterality: N/A;   • CARDIAC CATHETERIZATION N/A 4/27/2020    Procedure: Stent TANNER coronary;  Surgeon: Mj Anne MD;  Location:  [facility];  Service: Cardiovascular;  Laterality: N/A;       Family History   Problem Relation Age of Onset   • Hypertension Mother    • Cancer Mother        Social History     Socioeconomic History   • Marital status:    Tobacco Use   • Smoking status: Never Smoker   • Smokeless tobacco: Never Used   • Tobacco comment: caffeine use 1 cup coffee daily   Substance and Sexual Activity   • Alcohol use: Yes     Comment: social   • Drug use: Not Currently   • Sexual activity: Defer           Objective   Physical Exam  Vitals (The temperature is 98.2 °F, pulse 50, respirations 20, /86, room air pulse ox 100%.) and nursing note reviewed.   Constitutional:       Appearance: He is well-developed.   HENT:      Head: Normocephalic " and atraumatic.   Cardiovascular:      Rate and Rhythm: Normal rate and regular rhythm.      Heart sounds: No murmur heard.    No friction rub. No gallop.   Pulmonary:      Effort: Pulmonary effort is normal.      Breath sounds: Normal breath sounds.   Chest:      Chest wall: No tenderness.   Abdominal:      General: Bowel sounds are normal.      Palpations: Abdomen is soft. There is no mass.      Tenderness: There is no abdominal tenderness. There is no guarding or rebound.   Musculoskeletal:      Cervical back: Normal range of motion and neck supple.      Right lower leg: No edema.      Left lower leg: No edema.   Skin:     General: Skin is warm and dry.   Neurological:      General: No focal deficit present.      Mental Status: He is alert and oriented to person, place, and time.         Procedures           ED Course  ED Course as of 04/21/22 0045   Thu Apr 21, 2022   0030 The laboratory values were reviewed by me.  They are unremarkable.  The troponin is pending. [RC]   0039 The troponin is normal. [RC]      ED Course User Index  [RC] Pop Lopes MD      23:46 EDT, 04/20/22:  The EKG was obtained at 2342 and read by me at 2343.  The EKG shows a sinus bradycardia with rate of 50.  There is a normal axis with no hypertrophy.  The AL, QRS, and QT intervals are unremarkable.  There is no ectopy.  There is ST elevation is probably normal early repull pattern.  There is no acute ST elevation or depression.          00:45 EDT, 04/21/22:  The patient was reassessed.  He has no new complaints.  His vital signs reviewed and are stable.  He has no chest pain.    00:45 EDT, 04/21/22:  Spoke with Dr. Simon, on-call for the hospitalist, he will bring the patient in for observation.    00:45 EDT, 04/21/22:  The patient's diagnosis of chest pain was discussed with him.  He will be brought in for observation and cardiology consultation.  Patient's question were answered he will be admitted in stable condition                                  MDM    Final diagnoses:   Chest pain, unspecified type       ED Disposition  ED Disposition     None          No follow-up provider specified.       Medication List      No changes were made to your prescriptions during this visit.          Pop Lopes MD  04/21/22 0048

## 2022-04-21 NOTE — H&P
[facility]      Patient Name: Blake Dennis  : 1975  MRN: [id number]  Primary Care Physician:  Herminia Simmons APRN  Date of admission: 2022      Subjective      Chief Complaint: Chest pain    History of Present Illness: Blake Dennis is a 47 y.o. male who presented to [facility] on 2022 complaint of chest pain, retrosternal, radiating to both shoulders, off-and-on for last few months.  Patient had a stress test earlier in the year which was negative for ischemia.  Patient carries medical history of coronary artery disease with a stent in LAD almost 2 years ago.  Patient says he does exercise almost 4-5 times in a week, aerobic in nature.  He do not get pain in chest with exercise.  It just to have any correlation but chest pain can last up to the 2 hours.  Patient has seen the family physician.  Today he said when he took the nitro with chest pain, he realized the pain is getting better, he got concerned and he came into the ER.  Following this we were asked to admit the patient for the further care.  Initial EKG and cardia markers unremarkable.      Review of Systems   All other systems reviewed and are negative.       Personal History     Past Medical History:   Diagnosis Date   • BMI 38.0-38.9,adult    • CAD (coronary artery disease)    • Chest pain    • Elevated cholesterol    • Hypertension    • Sleep apnea        Past Surgical History:   Procedure Laterality Date   • CARDIAC CATHETERIZATION N/A 2020    Procedure: Coronary angiography;  Surgeon: Mj Anne MD;  Location: [facility] INVASIVE LOCATION;  Service: Cardiovascular;  Laterality: N/A;   • CARDIAC CATHETERIZATION N/A 2020    Procedure: Left heart cath;  Surgeon: Mj Anne MD;  Location: [facility] CATH INVASIVE LOCATION;  Service: Cardiovascular;  Laterality: N/A;   • CARDIAC CATHETERIZATION N/A 2020    Procedure: Left ventriculography;  Surgeon:  "Mj Anne MD;  Location: [facility] CATH INVASIVE LOCATION;  Service: Cardiovascular;  Laterality: N/A;   • CARDIAC CATHETERIZATION N/A 4/27/2020    Procedure: Stent TANNER coronary;  Surgeon: Mj Anne MD;  Location: [facility] CATH INVASIVE LOCATION;  Service: Cardiovascular;  Laterality: N/A;       Family History: family history includes Cancer in his mother; Hypertension in his mother. Otherwise pertinent FHx was reviewed and not pertinent to current issue.    Social History:  reports that he has never smoked. He has never used smokeless tobacco. He reports previous alcohol use of about 6.0 standard drinks of alcohol per week. He reports that he does not use drugs.    Home Medications:  Prior to Admission Medications     Prescriptions Last Dose Informant Patient Reported? Taking?    aspirin 81 MG chewable tablet   No No    Chew 1 tablet Daily.    clopidogrel (PLAVIX) 75 MG tablet   No No    TAKE 1 TABLET DAILY    metoprolol tartrate (LOPRESSOR) 25 MG tablet   No No    TAKE 1 TABLET TWICE A DAY    nitroglycerin (NITROSTAT) 0.4 MG SL tablet   No No    Place 1 tablet under the tongue Every 5 (Five) Minutes As Needed for Chest Pain. Place one tablet under tongue as needed for chest pain.            Allergies:  Allergies   Allergen Reactions   • Atorvastatin Dizziness      \"cloudiness in his head\".  Blurred vision and abdominal pain       Objective      Vitals:   Temp:  [97.5 °F (36.4 °C)-98.2 °F (36.8 °C)] 97.5 °F (36.4 °C)  Heart Rate:  [50-53] 53  Resp:  [16-20] 16  BP: (117-128)/(82-86) 128/82    Physical Exam  Vitals and nursing note reviewed.   Constitutional:       General: He is not in acute distress.     Appearance: Normal appearance. He is well-developed. He is not ill-appearing, toxic-appearing or diaphoretic.   HENT:      Head: Normocephalic and atraumatic.      Right Ear: Ear canal and external ear normal.      Left Ear: Ear canal and external ear normal.      Nose: Nose normal. No congestion or " rhinorrhea.      Mouth/Throat:      Mouth: Mucous membranes are moist.      Pharynx: No oropharyngeal exudate.   Eyes:      General: No scleral icterus.        Right eye: No discharge.         Left eye: No discharge.      Extraocular Movements: Extraocular movements intact.      Conjunctiva/sclera: Conjunctivae normal.      Pupils: Pupils are equal, round, and reactive to light.   Neck:      Thyroid: No thyromegaly.      Vascular: No carotid bruit or JVD.      Trachea: No tracheal deviation.   Cardiovascular:      Rate and Rhythm: Normal rate and regular rhythm.      Pulses: Normal pulses.      Heart sounds: Normal heart sounds. No murmur heard.    No friction rub. No gallop.   Pulmonary:      Effort: Pulmonary effort is normal. No respiratory distress.      Breath sounds: Normal breath sounds. No stridor. No wheezing, rhonchi or rales.   Chest:      Chest wall: No tenderness.   Abdominal:      General: Bowel sounds are normal. There is no distension.      Palpations: Abdomen is soft. There is no mass.      Tenderness: There is no abdominal tenderness. There is no guarding or rebound.      Hernia: No hernia is present.   Musculoskeletal:         General: No swelling, tenderness, deformity or signs of injury. Normal range of motion.      Cervical back: Normal range of motion and neck supple. No rigidity. No muscular tenderness.      Right lower leg: No edema.      Left lower leg: No edema.   Lymphadenopathy:      Cervical: No cervical adenopathy.   Skin:     General: Skin is warm and dry.      Coloration: Skin is not jaundiced or pale.      Findings: No bruising, erythema or rash.   Neurological:      General: No focal deficit present.      Mental Status: He is alert and oriented to person, place, and time. Mental status is at baseline.      Cranial Nerves: No cranial nerve deficit.      Sensory: No sensory deficit.      Motor: No weakness or abnormal muscle tone.      Coordination: Coordination normal.    Psychiatric:         Mood and Affect: Mood normal.         Behavior: Behavior normal.         Thought Content: Thought content normal.         Judgment: Judgment normal.          Result Review    Result Review:  I have personally reviewed the results from the time of this admission to 4/21/2022 05:05 EDT and agree with these findings:  [x]  Laboratory  [x]  Microbiology  [x]  Radiology  [x]  EKG/Telemetry   []  Cardiology/Vascular   []  Pathology  []  Old records  []  Other:  Most notable findings include:       Assessment/Plan        Active Hospital Problems:  Active Hospital Problems    Diagnosis    • Chest pain    • Coronary artery disease involving native coronary artery of native heart without angina pectoris      Plan:     Chest pain rule out acute coronary syndrome, EKG and serial cardia markers, 2D echo, cardiology consult to evaluate for further cardiac work-up, patient has a recent stress test which was negative for ischemia.  Patient may require cardiac cath.  We will continue with the aspirin, Plavix and metoprolol.  Check lipid profile, A1c, TSH.    History of CAD with LAD stent, patient on aspirin Plavix and metoprolol.    DVT prophylaxis:  Medical DVT prophylaxis orders are present.    CODE STATUS:    Level Of Support Discussed With: Patient  Code Status (Patient has no pulse and is not breathing): CPR (Attempt to Resuscitate)  Medical Interventions (Patient has pulse or is breathing): Full Support    Admission Status:  I believe this patient meets observation status.    I discussed the patient's findings and my recommendations with patient.    This patient has been examined wearing appropriate Personal Protective Equipment and discussed with hospital infection control department. 04/21/22      Signature:

## 2022-04-21 NOTE — PLAN OF CARE
Goal Outcome Evaluation:           Progress: no change  Outcome Evaluation: Transfer to cath [facility]

## 2022-04-21 NOTE — DISCHARGE INSTRUCTIONS
[facility]  [address] [place] [address]    Coronary Angiogram (Radial/Ulnar Approach) After Care    Refer to this sheet in the next few weeks. These instructions provide you with information on caring for yourself after your procedure. Your caregiver may also give you more specific instructions. Your treatment has been planned according to current medical practices, but problems sometimes occur. Call your caregiver if you have any problems or questions after your procedure.    Home Care Instructions:  You may shower the day after the procedure. Remove the bandage (dressing) and gently wash the site with plain soap and water. Gently pat the site dry. You may apply a band aid daily for 2 days if desired.    Do not apply powder or lotion to the site.  Do not submerge the affected site in water for 3 to 5 days or until the site is completely healed.   Do not lift, push or pull anything over 5 pounds for 5 days after your procedure or as directed by your physician.  As a reference, a gallon of milk weighs 8 pounds.   Inspect the site at least twice daily. You may notice some bruising at the site and it may be tender for 1 to 2 weeks.     Increase your fluid intake for the next 2 days.    Keep arm elevated for 24 hours. For the remainder of the day, keep your arm in “Pledge of Allegiance” position when up and about.     You may drive 24 hours after the procedure unless otherwise instructed by your caregiver.  Do not operate machinery or power tools for 24 hours.  A responsible adult should be with you for the first 24 hours after you arrive home. Do not make any important legal decisions or sign legal papers for 24 hours.  Do not drink alcohol for 24 hours.    Metformin or any medications containing Metformin should not be taken for 48 hours after your procedure.      Call Your Doctor if:   You have unusual pain at the radial/ulnar (wrist) site.  You have redness, warmth, swelling, or pain at the  radial/ulnar (wrist) site.  You have drainage (other than a small amount of blood on the dressing).  `You have chills or a fever > 101.  Your arm becomes pale or dark, cool, tingly, or numb.  You develop chest pain, shortness of breath, feel faint or pass out.    You have heavy bleeding from the site, hold pressure on the site for 20 minutes.  If the bleeding stops, apply a fresh bandage and call your cardiologist.  However, if you        continue to have bleeding, call 911 and continue to apply pressure to the site.   You have any symptoms of a stroke.  Remember BE FAST  B-balance. Sudden trouble walking or loss of balance.  E-eyes.  Sudden changes in how you see or a sudden onset of a very bad headache.   F-face. Sudden weakness or loss of feeling of the face or facial droop on one side.   A-arms Sudden weakness or numbness in one arm.  One arm drifts down if they are both held out in front of you. This happens suddenly and usually on one side of the body.   S-speech.  Sudden trouble speaking, slurred speech or trouble understanding what are saying.   T-time  Time to call emergency services.  Write down the symptoms and the time they started.

## 2022-04-21 NOTE — ED TRIAGE NOTES
Pt via PV from home with c/o midsternal CP and jaw pain intermittently x 1 week; this episode began about 1 hour ago. Pt took one nitro prior to arrival and pain is now 0/10.     All triage performed with this RN wearing appropriate PPE.  Pt placed in mask upon arrival to ED.

## 2022-04-22 ENCOUNTER — TELEPHONE (OUTPATIENT)
Dept: CARDIOLOGY | Facility: CLINIC | Age: 47
End: 2022-04-22

## 2022-04-22 NOTE — TELEPHONE ENCOUNTER
Patient called to report he had his heart cath done yesterday by Dr. Hui.  Patient was advised to stop ASA altogether and start Pantaprazole.  Patient is confused as to how acid reducer is going to help with the funny feelings in his jaw and arms. He asks what is next step?      Dr. Torres's patient.  AL out of office today.  Can you please call patient to discuss?  Thank you. ./ARNAUD

## 2022-04-22 NOTE — TELEPHONE ENCOUNTER
04/22/22  11:20 EDT      Reviewed cath note.  Patient's stent is patent and Dr. Hui thinks that GI could be source of discomfort.  Aspirin and Plavix can both be gastric irritants so he was told to discontinue aspirin to reduce the gastric irritant as Plavix will assist in protection of patient's stent.  Start pantoprazole to try to determine if this improves patient's symptoms.  He needs to follow-up with his PCP also.    NIKI Ojeda  [facility]

## 2022-04-22 NOTE — TELEPHONE ENCOUNTER
Patient had a heart cath done by Dr. Hui yesterday. WD advised to stop ASA and start Pantaprozole. He is to follow up with his PCP./ ARNAUD

## 2022-04-25 NOTE — TELEPHONE ENCOUNTER
Nicole, patient called today.  He would really like your input into helping him to understand how Protonix is to help with his jaw and arm pain.  Please call patient.  [phone]./ ARNAUD

## 2022-04-25 NOTE — TELEPHONE ENCOUNTER
I agree with Citlali's instruction and explanation. He has canceled two appts with me. Last week and for tomorrow. Triage please call him and explain again what Citlali recommended 3 days ago.

## 2022-04-26 NOTE — TELEPHONE ENCOUNTER
Reviewed recommendations with Blake Dennis and the patient verbalized understanding.    Patient is asking if GI problems can cause the jaw pain.  Explained that sometimes we experience referred pain in our bodies, however, cannot be sure that is the cause of his jaw pain.    Patient stated he has discussed this ongoing problem with his family doctor.  Per patient, family doctor told him he is experiencing angina and needs to be seen by cardiology.    Patient is scheduled to see KASIA Irizarry on 4/29/2022.    Please let me know if there is anything else you would like me to do for this patient.    Thank you,  Sheree Judd RN  Triage Nurse [facility] [place]

## 2022-04-26 NOTE — TELEPHONE ENCOUNTER
Reviewed recommendations with Blake Dennis and the patient verbalized understanding.    Encouraged patient to contact office if any new or worsening symptoms occur prior to his appointment.  Patient verbalized understanding.   Thank you,  Sheree Judd RN  Triage Nurse [facility] [place]

## 2022-04-29 ENCOUNTER — OFFICE VISIT (OUTPATIENT)
Dept: CARDIOLOGY | Facility: CLINIC | Age: 47
End: 2022-04-29

## 2022-04-29 VITALS
HEIGHT: 71 IN | BODY MASS INDEX: 35.98 KG/M2 | DIASTOLIC BLOOD PRESSURE: 70 MMHG | WEIGHT: 257 LBS | SYSTOLIC BLOOD PRESSURE: 112 MMHG

## 2022-04-29 DIAGNOSIS — I25.10 CORONARY ARTERY DISEASE INVOLVING NATIVE CORONARY ARTERY OF NATIVE HEART WITHOUT ANGINA PECTORIS: Primary | ICD-10-CM

## 2022-04-29 DIAGNOSIS — G47.33 OSA (OBSTRUCTIVE SLEEP APNEA): ICD-10-CM

## 2022-04-29 PROCEDURE — 99214 OFFICE O/P EST MOD 30 MIN: CPT | Performed by: NURSE PRACTITIONER

## 2022-04-29 PROCEDURE — 93000 ELECTROCARDIOGRAM COMPLETE: CPT | Performed by: NURSE PRACTITIONER

## 2022-04-29 NOTE — PROGRESS NOTES
Date of Office Visit: 22  Encounter Provider: NIKI Abdullahi  Place of Service: [facility] CARDIOLOGY  Patient Name: Blake Dennis  :1975    Chief Complaint   Patient presents with   • Coronary artery disease involving native coronary artery of   • Hypertension   • Hyperlipidemia   • Chest Pain   • Follow-up   :     HPI: Blake Dennis is a 47 y.o. male  with hypertension, coronary artery disease, obstructive sleep apnea, and obesity.  He has statin intolerance to rosuvastatin and atorvastatin.He has no pertinent family history however he does not know his father's side history.     He is followed by Dr. Torres. I will visit with him for follow-up and have reviewed his medical record.      In approximately  he was followed for obstructive sleep apnea but lost approximately 100 pounds and then was retested and was told he did not have sleep apnea.  In 2020 he had an abnormal stress echo with evidence of ischemia in the anterior wall.  He had ST segment depression of 1 mm during stress beginning at 6 minutes.  He had preserved left ventricular systolic function.  Proceeded to have cardiac catheterization where he was found to have a 70% mid LAD stenosis with a sequential 90% mid to distal segment stenosis.  There was 30% in the circumflex.  He received 2 drug-eluting stents to the mid LAD.  In 2022 he presented with nonexertional chest pain.  Stress echo was negative for ischemia.  His symptoms were felt to be noncardiac but he continued to have issues with intermittent nonexertional chest pain.  He presented to [facility] and was admitted.  Serial troponin negative.  Echocardiogram 2022 showed normal left ventricular systolic function.  Echocardiogram showed normal left main, 10-20% proximal LAD luminal irregularities, the stent in the mid LAD was widely patent.  Distal there was mild 20-40% lesion.  There was a third diagonal  "covered by a stent with a 90% lesion of the origin which was unchanged.  The circumflex had 20% in the midsegment and 10% in the OM1.  Right coronary artery was dominant with 10% proximal disease.     He presents today for reassessment.  About 2 weeks ago his PCP stopped rosuvastatin due to bilateral side pain.  He has been taking Protonix for the past week and yesterday and today he has had no chest pain.  He denies any further red arms or red cheek appearance.  He reports ringing in the ear but that is felt to be post COVID symptoms.  No near syncope or syncope or swelling.  He only has anxiety when his chest hurts otherwise he really has no issues with anxiety reportedly          Allergies   Allergen Reactions   • Crestor [Rosuvastatin] Myalgia   • Atorvastatin Dizziness      \"cloudiness in his head\".  Blurred vision and abdominal pain           Family and social history reviewed.     ROS  All other systems were reviewed and are negative          Objective:     Vitals:    04/29/22 1405   BP: 112/70   BP Location: Left arm   Patient Position: Sitting   Weight: 117 kg (257 lb)   Height: 180.3 cm (71\")     Body mass index is 35.84 kg/m².    PHYSICAL EXAM:  Cardiovascular:      Normal rate. Regular rhythm.      Comments: Right radial site within normal limits. Mildly ecchymotic without hematoma          ECG 12 Lead    Date/Time: 4/29/2022 5:07 PM  Performed by: Za Irizarry APRN  Authorized by: Za Irizarry APRN   Comparison: compared with previous ECG   Similar to previous ECG  Rhythm: sinus rhythm  Rate: normal  QRS axis: normal  Comments: No significant change from prior              Current Outpatient Medications   Medication Sig Dispense Refill   • clopidogrel (PLAVIX) 75 MG tablet TAKE 1 TABLET DAILY 90 tablet 3   • metoprolol tartrate (LOPRESSOR) 25 MG tablet TAKE 1 TABLET TWICE A  tablet 3   • nitroglycerin (NITROSTAT) 0.4 MG SL tablet Place 1 tablet under the tongue Every 5 (Five) Minutes As " Needed for Chest Pain. Place one tablet under tongue as needed for chest pain. 25 tablet 3   • pantoprazole (Protonix) 40 MG EC tablet Take 1 tablet by mouth Daily. 90 tablet 0     No current facility-administered medications for this visit.     Assessment:       Diagnosis Plan   1. Coronary artery disease involving native coronary artery of native heart without angina pectoris     2. KESHA (obstructive sleep apnea)          No orders of the defined types were placed in this encounter.        Plan:       1.  47-year-old gentleman with coronary artery disease, preserved left ventricular systolic function status post 2 drug-eluting stents April 2020.    Cardiac catheterization April 2022 showed unchanged small vessel disease.  He is stable and is to continue on Plavix monotherapy.  He has had no chest pain in the last 2 days see below  2.  Hypertension blood pressure appears adequate  3.  Obesity BMI 30.84.  He continues to lose weight and have commended him for this  4.  History of obstructive sleep apnea.  Now wearing CPAP  5 hyperlipidemia-he did not tolerate atorvastatin in 2 weeks ago his rosuvastatin was stopped due to side pain which is improving.  Zetia would be an alternative but I would wait several weeks before starting that  6.  GERD-he has been empirically treated with Protonix and now he is off aspirin  7.  Tinnitus-felt to be post-COVID symptoms  Follow-up with me in 1 month            It has been a pleasure to participate in this patient's care.      Thank you,  NIKI Abdullahi      **I used Dragon to dictate this note:**

## 2022-05-27 ENCOUNTER — OFFICE VISIT (OUTPATIENT)
Dept: CARDIOLOGY | Facility: CLINIC | Age: 47
End: 2022-05-27

## 2022-05-27 VITALS
SYSTOLIC BLOOD PRESSURE: 125 MMHG | WEIGHT: 256 LBS | HEIGHT: 71 IN | OXYGEN SATURATION: 97 % | BODY MASS INDEX: 35.84 KG/M2 | RESPIRATION RATE: 57 BRPM | DIASTOLIC BLOOD PRESSURE: 60 MMHG

## 2022-05-27 DIAGNOSIS — I73.9 CLAUDICATION OF BOTH LOWER EXTREMITIES: ICD-10-CM

## 2022-05-27 DIAGNOSIS — I10 PRIMARY HYPERTENSION: ICD-10-CM

## 2022-05-27 DIAGNOSIS — G47.33 OSA (OBSTRUCTIVE SLEEP APNEA): ICD-10-CM

## 2022-05-27 DIAGNOSIS — I25.10 CORONARY ARTERY DISEASE INVOLVING NATIVE CORONARY ARTERY OF NATIVE HEART WITHOUT ANGINA PECTORIS: Primary | ICD-10-CM

## 2022-05-27 PROCEDURE — 99214 OFFICE O/P EST MOD 30 MIN: CPT | Performed by: NURSE PRACTITIONER

## 2022-05-27 RX ORDER — CETIRIZINE HYDROCHLORIDE 10 MG/1
10 TABLET ORAL DAILY
COMMUNITY
End: 2023-03-03

## 2022-05-27 RX ORDER — CHLORAL HYDRATE 500 MG
1000 CAPSULE ORAL 2 TIMES DAILY
COMMUNITY

## 2022-05-27 NOTE — PROGRESS NOTES
Date of Office Visit: 22  Encounter Provider: NIKI Abdullahi  Place of Service: [facility] CARDIOLOG[facility]  Patient Name: Blake Dennis  :1975    No chief complaint on file.  :     HPI: Blake Dennis is a 47 y.o. male  with hypertension, coronary artery disease, obstructive sleep apnea, and obesity.  He has statin intolerance to rosuvastatin and atorvastatin.He has no pertinent family history however he does not know his father's side history.     He is followed by Dr. Torres. I will visit with him for follow-up and have reviewed his medical record.      In approximately  he was followed for obstructive sleep apnea but lost approximately 100 pounds and then was retested and was told he did not have sleep apnea.  In 2020 he had an abnormal stress echo with evidence of ischemia in the anterior wall.  He had ST segment depression of 1 mm during stress beginning at 6 minutes.  He had preserved left ventricular systolic function.  Proceeded to have cardiac catheterization where he was found to have a 70% mid LAD stenosis with a sequential 90% mid to distal segment stenosis.  There was 30% in the circumflex.  He received 2 drug-eluting stents to the mid LAD.  In 2022 he presented with nonexertional chest pain.  Stress echo was negative for ischemia.  His symptoms were felt to be noncardiac but he continued to have issues with intermittent nonexertional chest pain.  He presented to [facility] and was admitted.  Serial troponin negative.  Echocardiogram 2022 showed normal left ventricular systolic function. CArdiac catheterization showed normal left main, 10-20% proximal LAD luminal irregularities, the stent in the mid LAD was widely patent.  Distal there was mild 20-40% lesion.  There was a third diagonal covered by a stent with a 90% lesion of the origin which was unchanged.  The circumflex had 20% in the midsegment and 10% in the OM1.  Right  "coronary artery was dominant with 10% proximal disease.  He was started on pantoprazole.  He presents today for reassessment.  He has had no chest pain in the last 2 weeks.  He is walking on treadmill 3 times a week for an hour without exertional symptoms.  He is reported some pain in his left lower leg and numbness in his left foot over the past 2 to 3 weeks.  He is worried about peripheral arterial disease.  He is compliant with CPAP.  No near-syncope or syncope.  He continues to complain of abdominal discomfort which he thought was related to rosuvastatin but since being off rosuvastatin it still persists.              Allergies   Allergen Reactions   • Crestor [Rosuvastatin] Myalgia   • Atorvastatin Dizziness      \"cloudiness in his head\".  Blurred vision and abdominal pain           Family and social history reviewed.     ROS  All other systems were reviewed and are negative          Objective:     There were no vitals filed for this visit.  There is no height or weight on file to calculate BMI.    PHYSICAL EXAM:  Pulmonary:      Effort: Pulmonary effort is normal.      Breath sounds: Normal breath sounds.   Cardiovascular:      Normal rate. Regular rhythm.         Procedures      Current Outpatient Medications   Medication Sig Dispense Refill   • clopidogrel (PLAVIX) 75 MG tablet TAKE 1 TABLET DAILY 90 tablet 3   • metoprolol tartrate (LOPRESSOR) 25 MG tablet TAKE 1 TABLET TWICE A  tablet 3   • nitroglycerin (NITROSTAT) 0.4 MG SL tablet Place 1 tablet under the tongue Every 5 (Five) Minutes As Needed for Chest Pain. Place one tablet under tongue as needed for chest pain. 25 tablet 3   • pantoprazole (Protonix) 40 MG EC tablet Take 1 tablet by mouth Daily. 90 tablet 0     No current facility-administered medications for this visit.     Assessment:       Diagnosis Plan   1. Coronary artery disease involving native coronary artery of native heart without angina pectoris     2. KESHA (obstructive sleep apnea) "     3. Primary hypertension          No orders of the defined types were placed in this encounter.        Plan:     1.  47-year-old gentleman with coronary artery disease, preserved left ventricular systolic function status post 2 drug-eluting stents April 2020.    Cardiac catheterization April 2022 showed unchanged small vessel disease.  He is stable and is to continue on Plavix monotherapy.  He has had no chest pain in the last 2 weeks and his exercise without exertional symptoms continue the same  2.  Hypertension blood pressure appears adequate  3.  Obesity BMI  35.70.  He is exercising encouraged continued exercise.  He would benefit from diet modification and weight loss.  He reportedly is on a diet  4.  History of obstructive sleep apnea.  Now wearing CPAP  5 hyperlipidemia-he did not tolerate atorvastatin.  He is agreeable to try half tablet rosuvastatin again.  He will use his current supply at home  6.  GERD-he has been empirically treated with Protonix and now he is off aspirin.  He also reports generalized abdominal discomfort.  I have advised that he discuss with his PCP  7.  Tinnitus-felt to be post-COVID symptoms  8.  Left leg pain greater than the right with numb toes on the left.  He is to return for LUCRETIA testing    Follow-up in 6 months call questions or concerns          It has been a pleasure to participate in this patient's care.      Thank you,  NIKI Abdullahi      **I used Dragon to dictate this note:**

## 2022-06-02 ENCOUNTER — APPOINTMENT (OUTPATIENT)
Dept: CARDIOLOGY | Facility: HOSPITAL | Age: 47
End: 2022-06-02

## 2022-06-02 ENCOUNTER — HOSPITAL ENCOUNTER (OUTPATIENT)
Dept: CARDIOLOGY | Facility: HOSPITAL | Age: 47
Discharge: HOME | End: 2022-06-02
Admitting: NURSE PRACTITIONER

## 2022-06-02 DIAGNOSIS — I73.9 CLAUDICATION OF BOTH LOWER EXTREMITIES: ICD-10-CM

## 2022-06-02 LAB
BH CV LOWER ARTERIAL LEFT ABI RATIO: 1.31
BH CV LOWER ARTERIAL LEFT DORSALIS PEDIS SYS MAX: 173
BH CV LOWER ARTERIAL LEFT GREAT TOE SYS MAX: 141
BH CV LOWER ARTERIAL LEFT POST TIBIAL SYS MAX: 178
BH CV LOWER ARTERIAL LEFT TBI RATIO: 1.04
BH CV LOWER ARTERIAL RIGHT ABI RATIO: 1.35
BH CV LOWER ARTERIAL RIGHT DORSALIS PEDIS SYS MAX: 183
BH CV LOWER ARTERIAL RIGHT GREAT TOE SYS MAX: 145
BH CV LOWER ARTERIAL RIGHT POST TIBIAL SYS MAX: 175
BH CV LOWER ARTERIAL RIGHT TBI RATIO: 1.07
MAXIMAL PREDICTED HEART RATE: 175 BPM
STRESS TARGET HR: 149 BPM
UPPER ARTERIAL LEFT ARM BRACHIAL SYS MAX: 136 MMHG
UPPER ARTERIAL RIGHT ARM BRACHIAL SYS MAX: 125 MMHG

## 2022-06-02 PROCEDURE — 93922 UPR/L XTREMITY ART 2 LEVELS: CPT

## 2022-12-07 ENCOUNTER — OFFICE VISIT (OUTPATIENT)
Dept: CARDIOLOGY | Facility: CLINIC | Age: 47
End: 2022-12-07

## 2022-12-07 VITALS
DIASTOLIC BLOOD PRESSURE: 78 MMHG | BODY MASS INDEX: 39.34 KG/M2 | HEIGHT: 71 IN | SYSTOLIC BLOOD PRESSURE: 128 MMHG | OXYGEN SATURATION: 98 % | HEART RATE: 58 BPM | WEIGHT: 281 LBS

## 2022-12-07 DIAGNOSIS — I25.10 CORONARY ARTERY DISEASE INVOLVING NATIVE CORONARY ARTERY OF NATIVE HEART WITHOUT ANGINA PECTORIS: Primary | ICD-10-CM

## 2022-12-07 PROCEDURE — 99214 OFFICE O/P EST MOD 30 MIN: CPT | Performed by: INTERNAL MEDICINE

## 2022-12-08 NOTE — PROGRESS NOTES
"      CARDIOLOGY    Bo Torres MD    ENCOUNTER DATE:  12/07/2022    Blake Dennis / 47 y.o. / male        CHIEF COMPLAINT / REASON FOR OFFICE VISIT     Coronary Artery Disease (05/27/2022 Follow up)      HISTORY OF PRESENT ILLNESS       HPI  Blake Dennis is a 47 y.o. male who presents today for reevaluation.  Overall he is actually doing good clinically.  His only current issue is he has been unable to tolerate the statins in the past.  Patient thought he had side effects from it but really was not quite sure in retrospect.  He denies chest pain shortness of breath exercise intolerance or any other issues.      The following portions of the patient's history were reviewed and updated as appropriate: allergies, current medications, past family history, past medical history, past social history, past surgical history and problem list.      VITAL SIGNS     Visit Vitals  /78 (BP Location: Left arm)   Pulse 58   Ht 180.3 cm (71\")   Wt 127 kg (281 lb)   SpO2 98%   BMI 39.19 kg/m²         Wt Readings from Last 3 Encounters:   12/07/22 127 kg (281 lb)   05/27/22 116 kg (256 lb)   04/29/22 117 kg (257 lb)     Body mass index is 39.19 kg/m².      REVIEW OF SYSTEMS   ROS        PHYSICAL EXAMINATION     Vitals reviewed.   Constitutional:       Appearance: Healthy appearance.   Pulmonary:      Effort: Pulmonary effort is normal.   Cardiovascular:      Normal rate. Regular rhythm. Normal S1. Normal S2.      Murmurs: There is no murmur.      No gallop. No click. No rub.   Pulses:     Intact distal pulses.   Edema:     Peripheral edema absent.   Neurological:      Mental Status: Alert and oriented to person, place and time.           REVIEWED DATA     Procedures    Cardiac Procedures:  1.     Lipid Panel    Lipid Panel 4/21/22   Total Cholesterol 169   Triglycerides 95   HDL Cholesterol 33 (A)   VLDL Cholesterol 18   LDL Cholesterol  118 (A)   LDL/HDL Ratio 3.55   (A) Abnormal value                ASSESSMENT & " PLAN      Diagnosis Plan   1. Coronary artery disease involving native coronary artery of native heart without angina pectoris              SUMMARY/DISCUSSION  1. Coronary artery disease.  Clinically patient is doing well from that aspect.  Largest concern he is not on a statin at the current time.  He is going to try his Lipitor again to see if he has side effects like he has had in the past.  If he does told to stop it call me and we can try some Crestor.  If he has a similar side effects to both Lipitor and Crestor and he is a good candidate for Praluent or Repatha.  We will see patient back in 6 to 12 months again we will work on getting him a cholesterol medicine that he can tolerate.  I highly stressed the importance of treatment and optimizing his cholesterol profile.        MEDICATIONS         Discharge Medications          Accurate as of December 7, 2022 11:59 PM. If you have any questions, ask your nurse or doctor.            Continue These Medications      Instructions Start Date   cetirizine 10 MG tablet  Commonly known as: zyrTEC   10 mg, Oral, Daily      clopidogrel 75 MG tablet  Commonly known as: PLAVIX   TAKE 1 TABLET DAILY      fish oil 1000 MG capsule capsule   1,000 mg, Oral, 2 Times Daily      folic acid-vit B6-vit B12 2.5-25-1 MG tablet tablet  Commonly known as: FOLBEE   5,000 tablets, Oral, Daily      metoprolol tartrate 25 MG tablet  Commonly known as: LOPRESSOR   TAKE 1 TABLET TWICE A DAY      nitroglycerin 0.4 MG SL tablet  Commonly known as: NITROSTAT   0.4 mg, Sublingual, Every 5 Minutes PRN, Place one tablet under tongue as needed for chest pain.      pantoprazole 40 MG EC tablet  Commonly known as: Protonix   40 mg, Oral, Daily      vitamin D3 125 MCG (5000 UT) capsule capsule   50 Units, Oral, Daily                 **Dragon Disclaimer:   Much of this encounter note is an electronic transcription/translation of spoken language to printed text. The electronic translation of spoken  language may permit erroneous, or at times, nonsensical words or phrases to be inadvertently transcribed. Although I have reviewed the note for such errors, some may still exist.

## 2023-01-30 ENCOUNTER — OFFICE VISIT (OUTPATIENT)
Dept: SURGERY | Facility: CLINIC | Age: 48
End: 2023-01-30
Payer: COMMERCIAL

## 2023-01-30 VITALS
BODY MASS INDEX: 39.34 KG/M2 | DIASTOLIC BLOOD PRESSURE: 72 MMHG | SYSTOLIC BLOOD PRESSURE: 118 MMHG | RESPIRATION RATE: 16 BRPM | WEIGHT: 281 LBS | HEART RATE: 72 BPM | HEIGHT: 71 IN

## 2023-01-30 DIAGNOSIS — R22.42 LOWER LEG MASS, LEFT: Primary | ICD-10-CM

## 2023-01-30 DIAGNOSIS — R19.00 ABDOMINAL WALL BULGE: ICD-10-CM

## 2023-01-30 PROBLEM — E66.01 MORBID (SEVERE) OBESITY DUE TO EXCESS CALORIES: Status: ACTIVE | Noted: 2023-01-30

## 2023-01-30 PROCEDURE — 99203 OFFICE O/P NEW LOW 30 MIN: CPT | Performed by: SURGERY

## 2023-01-30 NOTE — PROGRESS NOTES
Blake Dennis 47 y.o. male presents @ the req of NIKI Robertson for eval of mass LLE X sev mos and  abd hernia.  Pt reports throbbing pain with leg mass. PCP previously ordered dopplers and pt reports they were neg.   Chief Complaint   Patient presents with   • Hernia     Abd hernia and LLE             HPI   Above-noted agree.  This very pleasant 47-year-old male is here with a nodule on his left lower extremity below his knee as well as a possible abdominal wall hernia.  He has been having issues with left lower extremity swelling for quite some time.  He has a several centimeter nodule that is tender.  He also has complaints of a bulge that occurs to the right of his umbilicus at times.  He said yesterday it was very big but today it is gone down.  He says you can see it better when he stands up.  He tolerates a regular diet without nausea or vomiting.  He moves his bowels without difficulty.  He does not smoke or use tobacco products.  He does have a significant cardiac history with multiple cardiac catheterizations and he does take Plavix.  He has no chest pain or shortness of breath.  He has no fevers or chills.  He has no other complaints.      Review of Systems   All other systems reviewed and are negative.            Current Outpatient Medications:   •  ALBUTEROL IN, Inhale., Disp: , Rfl:   •  atorvastatin (LIPITOR) 20 MG tablet, Take 20 mg by mouth Daily., Disp: , Rfl:   •  cetirizine (zyrTEC) 10 MG tablet, Take 10 mg by mouth Daily., Disp: , Rfl:   •  clopidogrel (PLAVIX) 75 MG tablet, TAKE 1 TABLET DAILY, Disp: 90 tablet, Rfl: 3  •  Cyanocobalamin (Vitamin B-12) 5000 MCG lozenge, Take  by mouth., Disp: , Rfl:   •  metoprolol tartrate (LOPRESSOR) 25 MG tablet, TAKE 1 TABLET TWICE A DAY, Disp: 180 tablet, Rfl: 3  •  nitroglycerin (NITROSTAT) 0.4 MG SL tablet, Place 1 tablet under the tongue Every 5 (Five) Minutes As Needed for Chest Pain. Place one tablet under tongue as needed for chest pain., Disp: 25  "tablet, Rfl: 3  •  Omega-3 Fatty Acids (fish oil) 1000 MG capsule capsule, Take 1,000 mg by mouth 2 (Two) Times a Day., Disp: , Rfl:   •  pantoprazole (PROTONIX) 20 MG EC tablet, Take 20 mg by mouth Daily., Disp: , Rfl:   •  vitamin D3 125 MCG (5000 UT) capsule capsule, Take 50 Units by mouth Daily., Disp: , Rfl:         Allergies   Allergen Reactions   • Atorvastatin Dizziness      \"cloudiness in his head\".  Blurred vision and abdominal pain           Past Medical History:   Diagnosis Date   • BMI 38.0-38.9,adult    • CAD (coronary artery disease)    • Chest pain    • Elevated cholesterol    • H/O heart artery stent     x2 2020 LAD   • Hypertension    • Sleep apnea     CPAP           Past Surgical History:   Procedure Laterality Date   • CARDIAC CATHETERIZATION N/A 4/27/2020    Procedure: Coronary angiography;  Surgeon: Mj Anne MD;  Location: [facility] CATH INVASIVE LOCATION;  Service: Cardiovascular;  Laterality: N/A;   • CARDIAC CATHETERIZATION N/A 4/27/2020    Procedure: Left heart cath;  Surgeon: Mj Anne MD;  Location: [facility] CATH INVASIVE LOCATION;  Service: Cardiovascular;  Laterality: N/A;   • CARDIAC CATHETERIZATION N/A 4/27/2020    Procedure: Left ventriculography;  Surgeon: Mj Anne MD;  [facility];  Service: Cardiovascular;  Laterality: N/A;   • CARDIAC CATHETERIZATION N/A 4/27/2020    Procedure: Stent TANNER coronary;  Surgeon: Mj Anne MD;  [facility];  Service: Cardiovascular;  Laterality: N/A;   • CARDIAC CATHETERIZATION N/A 4/21/2022    Procedure: Left Heart Cath;  Surgeon: Benjamin Hui MD;  [facility];  Service: Cardiovascular;  Laterality: N/A;   • CARDIAC CATHETERIZATION N/A 4/21/2022    Procedure: Coronary angiography;  Surgeon: Benjamin Hui MD;  Location: [facility] CATH INVASIVE LOCATION;  Service: Cardiovascular;  Laterality: N/A;   • CARDIAC CATHETERIZATION N/A " "4/21/2022    Procedure: Left ventriculography;  Surgeon: Benjamin Hui MD;  Location: [facility] INVASIVE LOCATION;  Service: Cardiovascular;  Laterality: N/A;           Social History     Tobacco Use   • Smoking status: Never   • Smokeless tobacco: Never   • Tobacco comments:     caffeine use 1 cup coffee daily   Vaping Use   • Vaping Use: Never used   Substance Use Topics   • Alcohol use: Yes     Alcohol/week: 6.0 standard drinks     Types: 6 Shots of liquor per week     Comment: very rarely   • Drug use: Never             There is no immunization history on file for this patient.        Physical Exam  Vitals and nursing note reviewed.   Constitutional:       Appearance: Normal appearance.   HENT:      Head: Normocephalic and atraumatic.   Cardiovascular:      Rate and Rhythm: Normal rate and regular rhythm.   Pulmonary:      Effort: Pulmonary effort is normal.      Breath sounds: Normal breath sounds.   Abdominal:      General: Bowel sounds are normal.      Palpations: Abdomen is soft.      Comments: On valsalva I am unable to palpate any abdominal wall hernia   Musculoskeletal:      Comments: On the lateral aspect of his left lower extremity below the knee is a 2 cm nodule   Neurological:      General: No focal deficit present.      Mental Status: He is alert and oriented to person, place, and time.   Psychiatric:         Mood and Affect: Mood normal.         Behavior: Behavior normal.         Debilities/Disabilities Identified: None    Emotional Behavior: Appropriate      /72   Pulse 72   Resp 16   Ht 180.3 cm (71\")   Wt 127 kg (281 lb)   BMI 39.19 kg/m²         Diagnoses and all orders for this visit:    1. Lower leg mass, left (Primary)    2. Abdominal wall bulge    We will get a CT scan to evaluate for a hernia.  If he does have a hernia, we can remove the leg mass and repair the hernia at the same time.    Thank you for allowing me to participate in the care of this interesting " patient.

## 2023-01-31 ENCOUNTER — TELEPHONE (OUTPATIENT)
Dept: SURGERY | Facility: CLINIC | Age: 48
End: 2023-01-31
Payer: COMMERCIAL

## 2023-01-31 DIAGNOSIS — R19.00 ABDOMINAL WALL BULGE: Primary | ICD-10-CM

## 2023-01-31 NOTE — TELEPHONE ENCOUNTER
Pt has been scheduled for CT ABD/PEL 02/06/23 arrival time 1:30pm CADEN Bhatt.    Follow up appt for CT with Dr. ROBERTS 02/08/23 1:30pm.    LVM with info and mailed letter.

## 2023-02-03 ENCOUNTER — PATIENT ROUNDING (BHMG ONLY) (OUTPATIENT)
Dept: SURGERY | Facility: CLINIC | Age: 48
End: 2023-02-03
Payer: COMMERCIAL

## 2023-02-03 NOTE — PROGRESS NOTES
February 3, 2023    Hello, may I speak with Blaek Dennis?    My name is Vicky Stratton      I [place] [facility]  [address] [place] [address] [phone].    Before we get started may I verify your date of birth? 1975    I am calling to officially welcome you to our practice and ask about your recent visit. Is this a good time to talk? no    LVM for pt to call back with questions or concerns

## 2023-02-06 ENCOUNTER — HOSPITAL ENCOUNTER (OUTPATIENT)
Dept: CT IMAGING | Facility: HOSPITAL | Age: 48
Discharge: HOME | End: 2023-02-06
Admitting: SURGERY
Payer: COMMERCIAL

## 2023-02-06 DIAGNOSIS — R19.00 ABDOMINAL WALL BULGE: ICD-10-CM

## 2023-02-06 PROCEDURE — 0 IOPAMIDOL PER 1 ML: Performed by: SURGERY

## 2023-02-06 PROCEDURE — 0 DIATRIZOATE MEGLUMINE & SODIUM PER 1 ML: Performed by: SURGERY

## 2023-02-06 PROCEDURE — 74177 CT ABD & PELVIS W/CONTRAST: CPT

## 2023-02-06 RX ADMIN — DIATRIZOATE MEGLUMINE AND DIATRIZOATE SODIUM 30 ML: 600; 100 SOLUTION ORAL; RECTAL at 13:27

## 2023-02-06 RX ADMIN — IOPAMIDOL 100 ML: 755 INJECTION, SOLUTION INTRAVENOUS at 15:06

## 2023-02-08 ENCOUNTER — OFFICE VISIT (OUTPATIENT)
Dept: SURGERY | Facility: CLINIC | Age: 48
End: 2023-02-08
Payer: COMMERCIAL

## 2023-02-08 VITALS
HEIGHT: 71 IN | SYSTOLIC BLOOD PRESSURE: 118 MMHG | HEART RATE: 72 BPM | BODY MASS INDEX: 39.34 KG/M2 | RESPIRATION RATE: 16 BRPM | DIASTOLIC BLOOD PRESSURE: 72 MMHG | WEIGHT: 281 LBS

## 2023-02-08 DIAGNOSIS — M79.605 BILATERAL LOWER EXTREMITY PAIN: Primary | ICD-10-CM

## 2023-02-08 DIAGNOSIS — M79.604 BILATERAL LOWER EXTREMITY PAIN: Primary | ICD-10-CM

## 2023-02-08 DIAGNOSIS — K42.9 UMBILICAL HERNIA WITHOUT OBSTRUCTION AND WITHOUT GANGRENE: Primary | ICD-10-CM

## 2023-02-08 DIAGNOSIS — K40.20 BILATERAL INGUINAL HERNIA WITHOUT OBSTRUCTION OR GANGRENE, RECURRENCE NOT SPECIFIED: ICD-10-CM

## 2023-02-08 PROCEDURE — 99214 OFFICE O/P EST MOD 30 MIN: CPT | Performed by: SURGERY

## 2023-02-08 NOTE — H&P
Blake Dennis 47 y.o. male presents for  FU/discuss CT.        HPI   Above noted and agree.  Blake is here following up from his CT scan.  He has an umbilical hernia and bilateral inguinal hernias.  He would like to have those repaired.  He tolerates a regular diet without nausea or vomiting.  He moves his bowels without difficulty.  He has no chest pain or shortness of breath.  He has no fevers or chills.  He does take Plavix for cardiac stents.  He saw his cardiologist in December.      Review of Systems   All other systems reviewed and are negative.          Past Medical History:   Diagnosis Date   • BMI 38.0-38.9,adult    • CAD (coronary artery disease)    • Chest pain    • Elevated cholesterol    • H/O heart artery stent     x2 2020 LAD   • Hypertension    • Sleep apnea     CPAP           Past Surgical History:   Procedure Laterality Date   • CARDIAC CATHETERIZATION N/A 4/27/2020    Procedure: Coronary angiography;  Surgeon: Mj Anne MD;  Location: [facility] CATH INVASIVE LOCATION;  Service: Cardiovascular;  Laterality: N/A;   • CARDIAC CATHETERIZATION N/A 4/27/2020    Procedure: Left heart cath;  Surgeon: Mj Anne MD;  Location: [facility] CATH INVASIVE LOCATION;  Service: Cardiovascular;  Laterality: N/A;   • CARDIAC CATHETERIZATION N/A 4/27/2020    Procedure: Left ventriculography;  Surgeon: Mj Anne MD;  Location: [facility] CATH INVASIVE LOCATION;  Service: Cardiovascular;  Laterality: N/A;   • CARDIAC CATHETERIZATION N/A 4/27/2020    Procedure: Stent TANNER coronary;  Surgeon: Mj Anne MD;  Location: [facility] CATH INVASIVE LOCATION;  Service: Cardiovascular;  Laterality: N/A;   • CARDIAC CATHETERIZATION N/A 4/21/2022    Procedure: Left Heart Cath;  Surgeon: Benjamin Hui MD;  Location: [facility] CATH INVASIVE LOC[facility];  Service: Cardiovascular;  Laterality: N/A;   • CARDIAC CATHETERIZATION N/A 4/21/2022    Procedure: Coronary angiography;  Surgeon: Benjamin Hui MD;   "Location: [facility] CATH INVASIVE LOCATION;  Service: Cardiovascular;  Laterality: N/A;   • CARDIAC CATHETERIZATION N/A 4/21/2022    Procedure: Left ventriculography;  Surgeon: Benjamin Hui MD;  Location: [facility] CATH INVASIVE LOCATION;  Service: Cardiovascular;  Laterality: N/A;           Physical Exam  Vitals and nursing note reviewed.   Constitutional:       Appearance: Normal appearance.   HENT:      Head: Normocephalic and atraumatic.   Cardiovascular:      Rate and Rhythm: Normal rate and regular rhythm.   Pulmonary:      Effort: Pulmonary effort is normal.      Breath sounds: Normal breath sounds.   Abdominal:      General: Bowel sounds are normal.      Palpations: Abdomen is soft.   Musculoskeletal:         General: No swelling or tenderness.   Skin:     General: Skin is warm and dry.   Neurological:      General: No focal deficit present.      Mental Status: He is alert and oriented to person, place, and time.   Psychiatric:         Mood and Affect: Mood normal.         Behavior: Behavior normal.         No debilities noted    /72   Pulse 72   Resp 16   Ht 180.3 cm (71\")   Wt 127 kg (281 lb)   BMI 39.19 kg/m²         Diagnoses and all orders for this visit:    1. Umbilical hernia without obstruction and without gangrene (Primary)    2. Bilateral inguinal hernia without obstruction or gangrene, recurrence not specified           We discussed performing an umbilical hernia repair with laparoscopic bilateral inguinal hernia repair.  Benefits and risks not limited to but including:  Bleeding, infection, hernia recurrence, testicular atrophy, inguinal pain, numbness to the leg or scrotal area, anesthesia complications.  The patient appeared to understand and is willing to proceed.    Thank you for allowing me to participate in the care of this interesting patient.        "

## 2023-02-08 NOTE — PROGRESS NOTES
Blake Dennis 47 y.o. male presents for  FU/discuss CT.        HPI   Above noted and agree.  Blake is here following up from his CT scan.  He has an umbilical hernia and bilateral inguinal hernias.  He would like to have those repaired.  He tolerates a regular diet without nausea or vomiting.  He moves his bowels without difficulty.  He has no chest pain or shortness of breath.  He has no fevers or chills.  He does take Plavix for cardiac stents.  He saw his cardiologist in December.      Review of Systems   All other systems reviewed and are negative.          Past Medical History:   Diagnosis Date   • BMI 38.0-38.9,adult    • CAD (coronary artery disease)    • Chest pain    • Elevated cholesterol    • H/O heart artery stent     x2 2020 LAD   • Hypertension    • Sleep apnea     CPAP           Past Surgical History:   Procedure Laterality Date   • CARDIAC CATHETERIZATION N/A 4/27/2020    Procedure: Coronary angiography;  Surgeon: Mj Anne MD;  Location: [facility] CATH INVASIVE LOCATION;  Service: Cardiovascular;  Laterality: N/A;   • CARDIAC CATHETERIZATION N/A 4/27/2020    Procedure: Left heart cath;  Surgeon: Mj Anne MD;  [facility];  Service: Cardiovascular;  Laterality: N/A;   • CARDIAC CATHETERIZATION N/A 4/27/2020    Procedure: Left ventriculography;  Surgeon: Mj Anne MD;  [facility];  Service: Cardiovascular;  Laterality: N/A;   • CARDIAC CATHETERIZATION N/A 4/27/2020    Procedure: Stent TANNER coronary;  Surgeon: Mj Anne MD;  Location: [facility] CATH INVASIVE LOCATION;  Service: Cardiovascular;  Laterality: N/A;   • CARDIAC CATHETERIZATION N/A 4/21/2022    Procedure: Left Heart Cath;  Surgeon: Benjamin Hui MD;  [facility];  Service: Cardiovascular;  Laterality: N/A;   • CARDIAC CATHETERIZATION N/A 4/21/2022    Procedure: Coronary angiography;  Surgeon: Benjamin Hui MD;   "Location: [facility] CATH INVASIVE LOCATION;  Service: Cardiovascular;  Laterality: N/A;   • CARDIAC CATHETERIZATION N/A 4/21/2022    Procedure: Left ventriculography;  Surgeon: Benjamin Hui MD;  Location: [facility] CATH INVASIVE LOCATION;  Service: Cardiovascular;  Laterality: N/A;           Physical Exam  Vitals and nursing note reviewed.   Constitutional:       Appearance: Normal appearance.   HENT:      Head: Normocephalic and atraumatic.   Cardiovascular:      Rate and Rhythm: Normal rate and regular rhythm.   Pulmonary:      Effort: Pulmonary effort is normal.      Breath sounds: Normal breath sounds.   Abdominal:      General: Bowel sounds are normal.      Palpations: Abdomen is soft.   Musculoskeletal:         General: No swelling or tenderness.   Skin:     General: Skin is warm and dry.   Neurological:      General: No focal deficit present.      Mental Status: He is alert and oriented to person, place, and time.   Psychiatric:         Mood and Affect: Mood normal.         Behavior: Behavior normal.         No debilities noted    /72   Pulse 72   Resp 16   Ht 180.3 cm (71\")   Wt 127 kg (281 lb)   BMI 39.19 kg/m²         Diagnoses and all orders for this visit:    1. Umbilical hernia without obstruction and without gangrene (Primary)    2. Bilateral inguinal hernia without obstruction or gangrene, recurrence not specified            We discussed performing an umbilical hernia repair with laparoscopic bilateral inguinal hernia repair.  Benefits and risks not limited to but including:  Bleeding, infection, hernia recurrence, testicular atrophy, inguinal pain, numbness to the leg or scrotal area, anesthesia complications.  The patient appeared to understand and is willing to proceed.    Thank you for allowing me to participate in the care of this interesting patient.    "

## 2023-02-09 PROBLEM — K42.9 UMBILICAL HERNIA WITHOUT OBSTRUCTION AND WITHOUT GANGRENE: Status: ACTIVE | Noted: 2023-02-09

## 2023-02-09 PROBLEM — K40.20 BILATERAL INGUINAL HERNIA WITHOUT OBSTRUCTION OR GANGRENE: Status: ACTIVE | Noted: 2023-02-09

## 2023-02-10 ENCOUNTER — TELEPHONE (OUTPATIENT)
Dept: CARDIOLOGY | Facility: CLINIC | Age: 48
End: 2023-02-10
Payer: COMMERCIAL

## 2023-02-10 LAB
Lab: NORMAL
MRSA SPEC QL CULT: NEGATIVE

## 2023-02-10 NOTE — TELEPHONE ENCOUNTER
Pt is in need of cardiac clearance to undergo a hernia repair. They are requesting he hold his plavix prior to surgery. Is pt clear? // HG

## 2023-02-13 NOTE — TELEPHONE ENCOUNTER
Returned Vicky's call from general Surgery about pt being able to hold the Plavix to let her know Dr. Torres said it's fine to hold it  7-10 days prior./prt

## 2023-02-17 ENCOUNTER — OFFICE VISIT (OUTPATIENT)
Dept: ORTHOPEDIC SURGERY | Facility: CLINIC | Age: 48
End: 2023-02-17
Payer: COMMERCIAL

## 2023-02-17 VITALS
HEART RATE: 59 BPM | BODY MASS INDEX: 39.2 KG/M2 | HEIGHT: 71 IN | SYSTOLIC BLOOD PRESSURE: 136 MMHG | WEIGHT: 280 LBS | DIASTOLIC BLOOD PRESSURE: 85 MMHG

## 2023-02-17 DIAGNOSIS — S86.892A MEDIAL TIBIAL STRESS SYNDROME, LEFT, INITIAL ENCOUNTER: ICD-10-CM

## 2023-02-17 DIAGNOSIS — R52 PAIN: Primary | ICD-10-CM

## 2023-02-17 PROCEDURE — 99214 OFFICE O/P EST MOD 30 MIN: CPT | Performed by: NURSE PRACTITIONER

## 2023-02-17 PROCEDURE — 73590 X-RAY EXAM OF LOWER LEG: CPT | Performed by: NURSE PRACTITIONER

## 2023-02-17 RX ORDER — PREDNISONE 10 MG/1
TABLET ORAL
Qty: 27 TABLET | Refills: 0 | Status: SHIPPED | OUTPATIENT
Start: 2023-02-17 | End: 2023-03-03

## 2023-02-17 NOTE — ASSESSMENT & PLAN NOTE
I discussed the plan of care with the patient. We also discussed concerns regarding a stress fracture of the tibia since he is tender along the anterior aspect, midshaft of the left tibia. We will proceed with an MRI to evaluate stress fracture. We discussed that he should continue ibuprofen only as needed after completion of the prednisone taper. We will plan to see him back in the clinic after completion of the MRI to discuss the results and further plan of care. We discussed he may proceed with x-ray imaging of his lumbar spine followed by a possible MRI of the spine if needed in the near future. All of his questions were answered.

## 2023-02-17 NOTE — PROGRESS NOTES
Positive tenderness along the midshaft tib-fib  Knee range of motion 0-1 25  Stable to varus and valgus stress at 0 degrees and 30 degrees  Negative medial and lateral Daniel's exam  Negative swelling  Positive sensation light touch all distributions of the left lower extremity  Negative calf tenderness, negative Homans' sign  Plantarflexion 45 degrees, dorsiflexion 25 degrees with 5 out of 5 strength  No swelling the ankle  Positive straight leg raise 70 degrees paresthesia to the dorsum and plantar aspect of the foot

## 2023-02-17 NOTE — PROGRESS NOTES
Subjective:     Patient ID: Blake Dennis is a 48 y.o. male.    Chief Complaint:  Left lower extremity pain, new patient to examiner  History of Present Illness  Blake Dennis presents today for an evaluation of his left lower extremity. Approximately 6 months ago, he began to experience acute onset of pain at the anterior aspect of the tibia/fibula. He denies any ankle swelling or pain. He denies any pain in the knee or swelling. He does work in steel-toed boots and is constantly walking. The patient denies any known injury, but has continued pain which does wax and wane dependent on activity. He does experience numbness and tingling with the left lower extremity elevated at night when he is resting. He has tried rest, ice, and heat. The patient has undergone an ultrasound of the left lower extremity, which was negative for deep vein thrombosis, per his primary care provider. He rates his discomfort a 5 out of 10 and describes it as shooting, aching, and throbbing sensation with occasional muscle tension tightness. He has tried stretches for symptom relief as well as strengthening. The patient has continued with anti-inflammatory medications as needed. He denies any similar symptoms in the past. He has tried and failed to improve with topical compound cream that he applied twice daily. The patient is unable to pinpoint the exact activity which increases his pain, but he does note the pain on a daily basis. He has had a recent CT of the abdomen for an unrelated issue that did show nerve compression. The lumbar spine was recommended for evaluation with an MRI in the future. He denies any other concerns at present.      Social History     Occupational History   • Not on file   Tobacco Use   • Smoking status: Never   • Smokeless tobacco: Never   • Tobacco comments:     caffeine use 1 cup coffee daily   Vaping Use   • Vaping Use: Never used   Substance and Sexual Activity   • Alcohol use: Not Currently      Alcohol/week: 6.0 standard drinks     Types: 6 Shots of liquor per week     Comment: very rarely   • Drug use: Never   • Sexual activity: Defer      Past Medical History:   Diagnosis Date   • BMI 38.0-38.9,adult    • CAD (coronary artery disease)    • Chest pain    • Elevated cholesterol    • H/O heart artery stent     x2 2020 LAD   • Hypertension    • Sleep apnea     CPAP     Past Surgical History:   Procedure Laterality Date   • CARDIAC CATHETERIZATION N/A 4/27/2020    Procedure: Coronary angiography;  Surgeon: Mj Anne MD;  [facility];  Service: Cardiovascular;  Laterality: N/A;   • CARDIAC CATHETERIZATION N/A 4/27/2020    Procedure: Left heart cath;  Surgeon: Mj Anne MD;  [facility];  Service: Cardiovascular;  Laterality: N/A;   • CARDIAC CATHETERIZATION N/A 4/27/2020    Procedure: Left ventriculography;  Surgeon: Mj Anne MD;  Location: [facility] CATH INVASIVE LOCATION;  Service: Cardiovascular;  Laterality: N/A;   • CARDIAC CATHETERIZATION N/A 4/27/2020    Procedure: Stent TANNER coronary;  Surgeon: Mj Anne MD;  Location: [facility] CATH INVASIVE LOCATION;  Service: Cardiovascular;  Laterality: N/A;   • CARDIAC CATHETERIZATION N/A 4/21/2022    Procedure: Left Heart Cath;  Surgeon: eBnjamin Hui MD;  Location: [facility] CATH INVASIVE LOCATION;  Service: Cardiovascular;  Laterality: N/A;   • CARDIAC CATHETERIZATION N/A 4/21/2022    Procedure: Coronary angiography;  Surgeon: Benjamin Hui MD;  Location: [facility] CATH INVASIVE LOCATION;  Service: Cardiovascular;  Laterality: N/A;   • CARDIAC CATHETERIZATION N/A 4/21/2022    Procedure: Left ventriculography;  Surgeon: Benjamin Hui MD;  Location: [facility] CATH INVASIVE LOCATION;  Service: Cardiovascular;  Laterality: N/A;       Family History   Problem Relation Age of Onset   • Hypertension Mother    • Cancer Mother                Objective:  Physical Exam    Vital signs  "reviewed.   General: No acute distress.  Eyes: conjunctiva clear; pupils equally round and reactive  ENT: external ears and nose atraumatic; oropharynx clear  CV: no peripheral edema  Resp: normal respiratory effort  Skin: no rashes or wounds; normal turgor  Psych: mood and affect appropriate; recent and remote memory intact    Vitals:    02/17/23 0839   BP: 136/85   Pulse: 59   Weight: 127 kg (280 lb)   Height: 180.3 cm (71\")         02/17/23  0839   Weight: 127 kg (280 lb)     Body mass index is 39.05 kg/m².      Ortho Exam     Left lower extremity examined:   Positive tenderness along the midshaft tib-fib  Knee range of motion 0-1 25  Stable to varus and valgus stress at 0 degrees and 30 degrees  Negative medial and lateral Daniel's exam  Negative swelling  Positive sensation light touch all distributions of the left lower extremity  Negative calf tenderness, negative Homans' sign  Plantarflexion 45 degrees, dorsiflexion 25 degrees with 5 out of 5 strength  No swelling the ankle  Positive straight leg raise 70 degrees paresthesia to the dorsum and plantar aspect of the foot  Imaging:  Left Tib-Fib X-Ray  Indication: Pain  AP and Lateral views    Findings:  No fracture  No bony lesion  Normal soft tissues  Normal joint spaces    No prior studies were available for comparison.    Assessment:        1. Pain    2. Medial tibial stress syndrome, left, initial encounter               Plan:  1. I discussed the plan of care with the patient. We also discussed concerns regarding a stress fracture of the tibia since he is tender along the anterior aspect, midshaft of the left tibia. We will proceed with an MRI to evaluate stress fracture. We discussed that he should continue ibuprofen only as needed after completion of the prednisone taper. We will plan to see him back in the clinic after completion of the MRI to discuss the results and further plan of care. We discussed he may proceed with x-ray imaging of his lumbar " spine followed by a possible MRI of the spine if needed in the near future. All of his questions were answered.    Orders:  Orders Placed This Encounter   Procedures   • XR Tibia Fibula 2 View Left     No orders of the defined types were placed in this encounter.          I ordered and reviewed the LACY today.     Transcribed from ambient dictation for NIKI Gorman by Juliet Saavedra.  02/17/23   09:42 EST    Patient or patient representative verbalized consent to the visit recording.  I have personally performed the services described in this document as transcribed by the above individual, and it is both accurate and complete.

## 2023-02-24 RX ORDER — CLOPIDOGREL BISULFATE 75 MG/1
TABLET ORAL
Qty: 90 TABLET | Refills: 3 | Status: SHIPPED | OUTPATIENT
Start: 2023-02-24

## 2023-03-03 ENCOUNTER — PRE-ADMISSION TESTING (OUTPATIENT)
Dept: PREADMISSION TESTING | Facility: HOSPITAL | Age: 48
End: 2023-03-03
Payer: COMMERCIAL

## 2023-03-03 VITALS
OXYGEN SATURATION: 98 % | SYSTOLIC BLOOD PRESSURE: 134 MMHG | WEIGHT: 284.5 LBS | HEIGHT: 71 IN | RESPIRATION RATE: 16 BRPM | DIASTOLIC BLOOD PRESSURE: 79 MMHG | BODY MASS INDEX: 39.83 KG/M2 | HEART RATE: 56 BPM

## 2023-03-03 LAB
ANION GAP SERPL CALCULATED.3IONS-SCNC: 13.2 MMOL/L (ref 5–15)
BUN SERPL-MCNC: 25 MG/DL (ref 6–20)
BUN/CREAT SERPL: 22.5 (ref 7–25)
CALCIUM SPEC-SCNC: 9.5 MG/DL (ref 8.6–10.5)
CHLORIDE SERPL-SCNC: 103 MMOL/L (ref 98–107)
CO2 SERPL-SCNC: 21.8 MMOL/L (ref 22–29)
CREAT SERPL-MCNC: 1.11 MG/DL (ref 0.76–1.27)
DEPRECATED RDW RBC AUTO: 38.5 FL (ref 37–54)
EGFRCR SERPLBLD CKD-EPI 2021: 82.9 ML/MIN/1.73
ERYTHROCYTE [DISTWIDTH] IN BLOOD BY AUTOMATED COUNT: 12.5 % (ref 12.3–15.4)
GLUCOSE SERPL-MCNC: 137 MG/DL (ref 65–99)
HBA1C MFR BLD: 4.9 % (ref 4.8–5.6)
HCT VFR BLD AUTO: 44.5 % (ref 37.5–51)
HGB BLD-MCNC: 15.4 G/DL (ref 13–17.7)
MCH RBC QN AUTO: 29.3 PG (ref 26.6–33)
MCHC RBC AUTO-ENTMCNC: 34.6 G/DL (ref 31.5–35.7)
MCV RBC AUTO: 84.8 FL (ref 79–97)
PLATELET # BLD AUTO: 241 10*3/MM3 (ref 140–450)
PMV BLD AUTO: 10.5 FL (ref 6–12)
POTASSIUM SERPL-SCNC: 3.8 MMOL/L (ref 3.5–5.2)
QT INTERVAL: 407 MS
RBC # BLD AUTO: 5.25 10*6/MM3 (ref 4.14–5.8)
SODIUM SERPL-SCNC: 138 MMOL/L (ref 136–145)
WBC NRBC COR # BLD: 9.34 10*3/MM3 (ref 3.4–10.8)

## 2023-03-03 PROCEDURE — 85027 COMPLETE CBC AUTOMATED: CPT | Performed by: SURGERY

## 2023-03-03 PROCEDURE — 80048 BASIC METABOLIC PNL TOTAL CA: CPT | Performed by: SURGERY

## 2023-03-03 PROCEDURE — 93005 ELECTROCARDIOGRAM TRACING: CPT

## 2023-03-03 PROCEDURE — 93010 ELECTROCARDIOGRAM REPORT: CPT | Performed by: INTERNAL MEDICINE

## 2023-03-03 PROCEDURE — 83036 HEMOGLOBIN GLYCOSYLATED A1C: CPT | Performed by: SURGERY

## 2023-03-03 PROCEDURE — 36415 COLL VENOUS BLD VENIPUNCTURE: CPT

## 2023-03-03 RX ORDER — MELATONIN
4000 DAILY
COMMUNITY

## 2023-03-03 NOTE — PAT
Pt here for PAT visit.  Pre-op tests completed, chg soap given, and instructions reviewed.  Instructed nothing to eat/drink after midnight night prior and to bring cpap, voiced understanding. Cardiac clearance in media/encounters.

## 2023-03-03 NOTE — DISCHARGE INSTRUCTIONS
PRE-ADMISSION TESTING INSTRUCTIONS FOR ADULTS    Take these medications the morning of surgery with a small sip of water: use inhaler, take metoprolol      Do not take any insulin or diabetes medications the morning of surgery.      No aspirin, advil, aleve, ibuprofen, naproxen, diet pills, decongestants, or herbal/vitamins for a week prior to surgery.   Stop vitamins/supplements today and plavix per MD instructions    Tylenol/Acetaminophen is okay to take if needed.    General Instructions:    DO NOT EAT SOLID FOOD OR DRINK LIQUIDS AFTER MIDNIGHT THE NIGHT BEFORE SURGERY. No gum, mints, or hard candy after midnight the night before surgery.    Patients who avoid smoking, chewing tobacco and alcohol for 4 weeks prior to surgery have a reduced risk of post-operative complications.  If at all possible, quit smoking as many days before surgery as you can.    Do not smoke, use chewing tobacco or drink alcohol the day of surgery    Bring your C-PAP/ BI-PAP machine if you use one.  Wear clean comfortable clothes and socks.  Do not wear contact lenses, lotion, deodorant, or make-up.  Bring a case for your glasses if applicable. You may brush your teeth the morning of surgery.  You may wear dentures/partials, do not put adhesive/glue on them.  Leave all other jewelry and valuables at home.      Preventing a Surgical Site Infection:    Shower the night before and on the morning of surgery using the chlorhexidine soap you were given.  Use a clean washcloth with the soap.  Place clean sheets on your bed after showering the night before surgery. Do not use the CHG soap on your hair, face, or private areas. Wash your body gently for five (5) minutes. Do not scrub your skin.  Dry with a clean towel and dress in clean clothing.  Do not shave the surgical area for 10 days-2 weeks prior to surgery  because the razor can irritate skin and make it easier to develop an infection.  Make sure you, your family, and all healthcare  providers clean their hands with soap and water or an alcohol based hand  before caring for you or your wound.      Day of surgery:    Your surgeon’s office will advise you of your arrival time for the day of surgery.    Upon arrival, a Pre-op nurse and Anesthesia provider will review your health history, obtain vital signs, and answer questions you may have. The anesthesia provider will also discuss the type of anesthesia that will be needed for your procedure, which may include general anesthesia. The only belongings needed at this time will be your home medications and if applicable your C-PAP/BI-PAP machine.  If you are staying overnight your family can leave the rest of your belongings in the car and bring them to your room later.  A Pre-op nurse will start an IV and you may receive medication in preparation for surgery, including something to help you relax.  Your family will be able to see you in the Pre-op area.  While you are in surgery your family should notify the waiting room  if they leave the waiting room area and provide a contact phone number.    IF you have any questions, you can call the Pre-Admission Department at [phone] or your surgeon's office.  Notify your surgeon if  you become sick, have a fever, productive cough, or cannot be here the day of surgery    Please be aware that surgery does come with discomfort.  We want to make every effort to control your discomfort so please discuss any uncontrolled symptoms with your nurse.   Your doctor will most likely have prescribed pain medications.      If you are going home after surgery, you will receive individualized written care instructions before being discharged.  A responsible adult (over the age of 18) must drive you to and from the hospital on the day of your surgery and stay with you for 24 hours after anesthesia.    If you are staying overnight following surgery, you will be transported to your hospital room  following the recovery period.  [facility] has all private rooms.    You may receive a survey regarding the care you received. Your feedback is very important and will be used to collect the necessary data to help us to continue to provide excellent care.     Deductibles and co-payments are collected on the day of service. Please be prepared to pay the required co-pay, deductible or deposit on the day of service as defined by your plan.

## 2023-03-08 ENCOUNTER — ANESTHESIA EVENT (OUTPATIENT)
Dept: PERIOP | Facility: HOSPITAL | Age: 48
End: 2023-03-08
Payer: COMMERCIAL

## 2023-03-09 ENCOUNTER — HOSPITAL ENCOUNTER (OUTPATIENT)
Facility: HOSPITAL | Age: 48
Setting detail: HOSPITAL OUTPATIENT SURGERY
Discharge: HOME | End: 2023-03-09
Attending: SURGERY | Admitting: SURGERY
Payer: COMMERCIAL

## 2023-03-09 ENCOUNTER — ANESTHESIA (OUTPATIENT)
Dept: PERIOP | Facility: HOSPITAL | Age: 48
End: 2023-03-09
Payer: COMMERCIAL

## 2023-03-09 VITALS
OXYGEN SATURATION: 94 % | RESPIRATION RATE: 20 BRPM | TEMPERATURE: 98 F | SYSTOLIC BLOOD PRESSURE: 129 MMHG | DIASTOLIC BLOOD PRESSURE: 79 MMHG | HEART RATE: 61 BPM

## 2023-03-09 DIAGNOSIS — K40.20 BILATERAL INGUINAL HERNIA WITHOUT OBSTRUCTION OR GANGRENE, RECURRENCE NOT SPECIFIED: ICD-10-CM

## 2023-03-09 DIAGNOSIS — K42.9 UMBILICAL HERNIA WITHOUT OBSTRUCTION AND WITHOUT GANGRENE: ICD-10-CM

## 2023-03-09 PROCEDURE — 49650 LAP ING HERNIA REPAIR INIT: CPT | Performed by: SURGERY

## 2023-03-09 PROCEDURE — C1781 MESH (IMPLANTABLE): HCPCS | Performed by: SURGERY

## 2023-03-09 PROCEDURE — 25010000002 FENTANYL CITRATE (PF) 100 MCG/2ML SOLUTION: Performed by: NURSE ANESTHETIST, CERTIFIED REGISTERED

## 2023-03-09 PROCEDURE — 25010000002 CEFAZOLIN PER 500 MG: Performed by: SURGERY

## 2023-03-09 PROCEDURE — 25010000002 PROPOFOL 200 MG/20ML EMULSION: Performed by: NURSE ANESTHETIST, CERTIFIED REGISTERED

## 2023-03-09 PROCEDURE — 25010000002 ONDANSETRON PER 1 MG: Performed by: NURSE ANESTHETIST, CERTIFIED REGISTERED

## 2023-03-09 PROCEDURE — 88302 TISSUE EXAM BY PATHOLOGIST: CPT | Performed by: SURGERY

## 2023-03-09 PROCEDURE — 49650 LAP ING HERNIA REPAIR INIT: CPT | Performed by: SPECIALIST/TECHNOLOGIST, OTHER

## 2023-03-09 PROCEDURE — 49592 RPR AA HRN 1ST < 3 NCR/STRN: CPT | Performed by: SPECIALIST/TECHNOLOGIST, OTHER

## 2023-03-09 PROCEDURE — 25010000002 NEOSTIGMINE 10 MG/10ML SOLUTION: Performed by: NURSE ANESTHETIST, CERTIFIED REGISTERED

## 2023-03-09 PROCEDURE — 25010000002 DEXAMETHASONE PER 1 MG: Performed by: NURSE ANESTHETIST, CERTIFIED REGISTERED

## 2023-03-09 PROCEDURE — 25010000002 MIDAZOLAM PER 1MG: Performed by: NURSE ANESTHETIST, CERTIFIED REGISTERED

## 2023-03-09 PROCEDURE — 25010000002 SUCCINYLCHOLINE PER 20 MG: Performed by: NURSE ANESTHETIST, CERTIFIED REGISTERED

## 2023-03-09 PROCEDURE — 49592 RPR AA HRN 1ST < 3 NCR/STRN: CPT | Performed by: SURGERY

## 2023-03-09 PROCEDURE — 25010000002 KETOROLAC TROMETHAMINE PER 15 MG: Performed by: NURSE ANESTHETIST, CERTIFIED REGISTERED

## 2023-03-09 DEVICE — HEMOST ABS SURGICEL PWDR 3GM: Type: IMPLANTABLE DEVICE | Site: ABDOMEN | Status: FUNCTIONAL

## 2023-03-09 DEVICE — BARD 3DMAX MESH RIGHT LARGE
Type: IMPLANTABLE DEVICE | Site: ABDOMEN | Status: FUNCTIONAL
Brand: BARD 3DMAX MESH

## 2023-03-09 DEVICE — BARD 3DMAX MESH LEFT LARGE
Type: IMPLANTABLE DEVICE | Site: ABDOMEN | Status: FUNCTIONAL
Brand: BARD 3DMAX MESH

## 2023-03-09 DEVICE — VENTRALEX ST HERNIA PATCH
Type: IMPLANTABLE DEVICE | Site: ABDOMEN | Status: FUNCTIONAL
Brand: VENTRALEX ST HERNIA PATCH

## 2023-03-09 DEVICE — CAPSURE PERMANENT FIXATION SYSTEM 30 PERMANENT FASTENERS
Type: IMPLANTABLE DEVICE | Site: ABDOMEN | Status: FUNCTIONAL
Brand: CAPSURE PERMANENT FIXATION SYSTEM

## 2023-03-09 RX ORDER — SODIUM CHLORIDE, SODIUM LACTATE, POTASSIUM CHLORIDE, CALCIUM CHLORIDE 600; 310; 30; 20 MG/100ML; MG/100ML; MG/100ML; MG/100ML
100 INJECTION, SOLUTION INTRAVENOUS CONTINUOUS
Status: DISCONTINUED | OUTPATIENT
Start: 2023-03-09 | End: 2023-03-09 | Stop reason: HOSPADM

## 2023-03-09 RX ORDER — ONDANSETRON 2 MG/ML
4 INJECTION INTRAMUSCULAR; INTRAVENOUS ONCE AS NEEDED
Status: COMPLETED | OUTPATIENT
Start: 2023-03-09 | End: 2023-03-09

## 2023-03-09 RX ORDER — MAGNESIUM HYDROXIDE 1200 MG/15ML
LIQUID ORAL AS NEEDED
Status: DISCONTINUED | OUTPATIENT
Start: 2023-03-09 | End: 2023-03-09 | Stop reason: HOSPADM

## 2023-03-09 RX ORDER — LIDOCAINE HYDROCHLORIDE 10 MG/ML
0.5 INJECTION, SOLUTION EPIDURAL; INFILTRATION; INTRACAUDAL; PERINEURAL ONCE AS NEEDED
Status: DISCONTINUED | OUTPATIENT
Start: 2023-03-09 | End: 2023-03-09 | Stop reason: HOSPADM

## 2023-03-09 RX ORDER — SUCCINYLCHOLINE CHLORIDE 20 MG/ML
INJECTION INTRAMUSCULAR; INTRAVENOUS AS NEEDED
Status: DISCONTINUED | OUTPATIENT
Start: 2023-03-09 | End: 2023-03-09 | Stop reason: SURG

## 2023-03-09 RX ORDER — ROCURONIUM BROMIDE 10 MG/ML
INJECTION, SOLUTION INTRAVENOUS AS NEEDED
Status: DISCONTINUED | OUTPATIENT
Start: 2023-03-09 | End: 2023-03-09 | Stop reason: SURG

## 2023-03-09 RX ORDER — BUPIVACAINE HYDROCHLORIDE 5 MG/ML
INJECTION, SOLUTION EPIDURAL; INTRACAUDAL AS NEEDED
Status: DISCONTINUED | OUTPATIENT
Start: 2023-03-09 | End: 2023-03-09 | Stop reason: HOSPADM

## 2023-03-09 RX ORDER — MIDAZOLAM HYDROCHLORIDE 2 MG/2ML
1 INJECTION, SOLUTION INTRAMUSCULAR; INTRAVENOUS
Status: COMPLETED | OUTPATIENT
Start: 2023-03-09 | End: 2023-03-09

## 2023-03-09 RX ORDER — SODIUM CHLORIDE, SODIUM LACTATE, POTASSIUM CHLORIDE, CALCIUM CHLORIDE 600; 310; 30; 20 MG/100ML; MG/100ML; MG/100ML; MG/100ML
9 INJECTION, SOLUTION INTRAVENOUS CONTINUOUS
Status: DISCONTINUED | OUTPATIENT
Start: 2023-03-09 | End: 2023-03-09 | Stop reason: HOSPADM

## 2023-03-09 RX ORDER — CEFAZOLIN SODIUM IN 0.9 % NACL 3 G/100 ML
3 INTRAVENOUS SOLUTION, PIGGYBACK (ML) INTRAVENOUS ONCE
Status: COMPLETED | OUTPATIENT
Start: 2023-03-09 | End: 2023-03-09

## 2023-03-09 RX ORDER — LIDOCAINE HYDROCHLORIDE 20 MG/ML
INJECTION, SOLUTION INFILTRATION; PERINEURAL AS NEEDED
Status: DISCONTINUED | OUTPATIENT
Start: 2023-03-09 | End: 2023-03-09 | Stop reason: SURG

## 2023-03-09 RX ORDER — ONDANSETRON 2 MG/ML
4 INJECTION INTRAMUSCULAR; INTRAVENOUS ONCE AS NEEDED
Status: DISCONTINUED | OUTPATIENT
Start: 2023-03-09 | End: 2023-03-09 | Stop reason: HOSPADM

## 2023-03-09 RX ORDER — HYDROCODONE BITARTRATE AND ACETAMINOPHEN 5; 325 MG/1; MG/1
1 TABLET ORAL EVERY 4 HOURS PRN
Qty: 30 TABLET | Refills: 0 | Status: SHIPPED | OUTPATIENT
Start: 2023-03-09

## 2023-03-09 RX ORDER — DEXAMETHASONE SODIUM PHOSPHATE 4 MG/ML
8 INJECTION, SOLUTION INTRA-ARTICULAR; INTRALESIONAL; INTRAMUSCULAR; INTRAVENOUS; SOFT TISSUE ONCE
Status: COMPLETED | OUTPATIENT
Start: 2023-03-09 | End: 2023-03-09

## 2023-03-09 RX ORDER — FENTANYL CITRATE 50 UG/ML
INJECTION, SOLUTION INTRAMUSCULAR; INTRAVENOUS AS NEEDED
Status: DISCONTINUED | OUTPATIENT
Start: 2023-03-09 | End: 2023-03-09 | Stop reason: SURG

## 2023-03-09 RX ORDER — KETOROLAC TROMETHAMINE 30 MG/ML
INJECTION, SOLUTION INTRAMUSCULAR; INTRAVENOUS AS NEEDED
Status: DISCONTINUED | OUTPATIENT
Start: 2023-03-09 | End: 2023-03-09 | Stop reason: SURG

## 2023-03-09 RX ORDER — KETAMINE HYDROCHLORIDE 10 MG/ML
INJECTION INTRAMUSCULAR; INTRAVENOUS AS NEEDED
Status: DISCONTINUED | OUTPATIENT
Start: 2023-03-09 | End: 2023-03-09 | Stop reason: SURG

## 2023-03-09 RX ORDER — SODIUM CHLORIDE 0.9 % (FLUSH) 0.9 %
10 SYRINGE (ML) INJECTION AS NEEDED
Status: DISCONTINUED | OUTPATIENT
Start: 2023-03-09 | End: 2023-03-09 | Stop reason: HOSPADM

## 2023-03-09 RX ORDER — FAMOTIDINE 10 MG/ML
20 INJECTION, SOLUTION INTRAVENOUS
Status: COMPLETED | OUTPATIENT
Start: 2023-03-09 | End: 2023-03-09

## 2023-03-09 RX ORDER — SODIUM CHLORIDE 0.9 % (FLUSH) 0.9 %
10 SYRINGE (ML) INJECTION EVERY 12 HOURS SCHEDULED
Status: DISCONTINUED | OUTPATIENT
Start: 2023-03-09 | End: 2023-03-09 | Stop reason: HOSPADM

## 2023-03-09 RX ORDER — GLYCOPYRROLATE 0.2 MG/ML
INJECTION INTRAMUSCULAR; INTRAVENOUS AS NEEDED
Status: DISCONTINUED | OUTPATIENT
Start: 2023-03-09 | End: 2023-03-09 | Stop reason: SURG

## 2023-03-09 RX ORDER — FENTANYL CITRATE 50 UG/ML
50 INJECTION, SOLUTION INTRAMUSCULAR; INTRAVENOUS
Status: DISCONTINUED | OUTPATIENT
Start: 2023-03-09 | End: 2023-03-09 | Stop reason: HOSPADM

## 2023-03-09 RX ORDER — DEXMEDETOMIDINE HYDROCHLORIDE 100 UG/ML
INJECTION, SOLUTION INTRAVENOUS AS NEEDED
Status: DISCONTINUED | OUTPATIENT
Start: 2023-03-09 | End: 2023-03-09 | Stop reason: SURG

## 2023-03-09 RX ORDER — HYDROCODONE BITARTRATE AND ACETAMINOPHEN 7.5; 325 MG/1; MG/1
1 TABLET ORAL ONCE AS NEEDED
Status: COMPLETED | OUTPATIENT
Start: 2023-03-09 | End: 2023-03-09

## 2023-03-09 RX ORDER — NEOSTIGMINE METHYLSULFATE 1 MG/ML
INJECTION, SOLUTION INTRAVENOUS AS NEEDED
Status: DISCONTINUED | OUTPATIENT
Start: 2023-03-09 | End: 2023-03-09 | Stop reason: SURG

## 2023-03-09 RX ORDER — LIDOCAINE HYDROCHLORIDE AND EPINEPHRINE 10; 10 MG/ML; UG/ML
INJECTION, SOLUTION INFILTRATION; PERINEURAL AS NEEDED
Status: DISCONTINUED | OUTPATIENT
Start: 2023-03-09 | End: 2023-03-09 | Stop reason: HOSPADM

## 2023-03-09 RX ORDER — SODIUM CHLORIDE 9 MG/ML
40 INJECTION, SOLUTION INTRAVENOUS AS NEEDED
Status: DISCONTINUED | OUTPATIENT
Start: 2023-03-09 | End: 2023-03-09 | Stop reason: HOSPADM

## 2023-03-09 RX ORDER — PROPOFOL 10 MG/ML
INJECTION, EMULSION INTRAVENOUS AS NEEDED
Status: DISCONTINUED | OUTPATIENT
Start: 2023-03-09 | End: 2023-03-09 | Stop reason: SURG

## 2023-03-09 RX ADMIN — DEXMEDETOMIDINE 20 MCG: 100 INJECTION, SOLUTION, CONCENTRATE INTRAVENOUS at 07:32

## 2023-03-09 RX ADMIN — DEXAMETHASONE SODIUM PHOSPHATE 8 MG: 4 INJECTION, SOLUTION INTRAMUSCULAR; INTRAVENOUS at 06:57

## 2023-03-09 RX ADMIN — FAMOTIDINE 20 MG: 10 INJECTION, SOLUTION INTRAVENOUS at 06:57

## 2023-03-09 RX ADMIN — SODIUM CHLORIDE, POTASSIUM CHLORIDE, SODIUM LACTATE AND CALCIUM CHLORIDE 9 ML/HR: 600; 310; 30; 20 INJECTION, SOLUTION INTRAVENOUS at 06:36

## 2023-03-09 RX ADMIN — ROCURONIUM BROMIDE 5 MG: 10 INJECTION, SOLUTION INTRAVENOUS at 08:02

## 2023-03-09 RX ADMIN — FENTANYL CITRATE 50 MCG: 50 INJECTION, SOLUTION INTRAMUSCULAR; INTRAVENOUS at 09:17

## 2023-03-09 RX ADMIN — ROCURONIUM BROMIDE 10 MG: 10 INJECTION, SOLUTION INTRAVENOUS at 08:58

## 2023-03-09 RX ADMIN — HYDROCODONE BITARTRATE AND ACETAMINOPHEN 1 TABLET: 7.5; 325 TABLET ORAL at 10:42

## 2023-03-09 RX ADMIN — NEOSTIGMINE METHYLSULFATE 4 MG: 0.5 INJECTION INTRAVENOUS at 09:35

## 2023-03-09 RX ADMIN — FENTANYL CITRATE 50 MCG: 50 INJECTION, SOLUTION INTRAMUSCULAR; INTRAVENOUS at 07:46

## 2023-03-09 RX ADMIN — LIDOCAINE HYDROCHLORIDE 100 MG: 20 INJECTION, SOLUTION INFILTRATION; PERINEURAL at 07:32

## 2023-03-09 RX ADMIN — KETOROLAC TROMETHAMINE 30 MG: 30 INJECTION, SOLUTION INTRAMUSCULAR; INTRAVENOUS at 09:35

## 2023-03-09 RX ADMIN — KETAMINE HYDROCHLORIDE 30 MG: 10 INJECTION INTRAMUSCULAR; INTRAVENOUS at 07:32

## 2023-03-09 RX ADMIN — LIDOCAINE HYDROCHLORIDE 100 MG: 20 INJECTION, SOLUTION INFILTRATION; PERINEURAL at 09:17

## 2023-03-09 RX ADMIN — ROCURONIUM BROMIDE 5 MG: 10 INJECTION, SOLUTION INTRAVENOUS at 08:25

## 2023-03-09 RX ADMIN — GLYCOPYRROLATE 0.6 MG: 0.2 INJECTION INTRAMUSCULAR; INTRAVENOUS at 09:35

## 2023-03-09 RX ADMIN — MIDAZOLAM HYDROCHLORIDE 1 MG: 1 INJECTION, SOLUTION INTRAMUSCULAR; INTRAVENOUS at 07:16

## 2023-03-09 RX ADMIN — ONDANSETRON 4 MG: 2 INJECTION INTRAMUSCULAR; INTRAVENOUS at 06:57

## 2023-03-09 RX ADMIN — Medication 3 G: at 07:48

## 2023-03-09 RX ADMIN — MIDAZOLAM HYDROCHLORIDE 1 MG: 1 INJECTION, SOLUTION INTRAMUSCULAR; INTRAVENOUS at 07:27

## 2023-03-09 RX ADMIN — ROCURONIUM BROMIDE 30 MG: 10 INJECTION, SOLUTION INTRAVENOUS at 07:46

## 2023-03-09 RX ADMIN — SUCCINYLCHOLINE CHLORIDE 200 MG: 20 INJECTION, SOLUTION INTRAMUSCULAR; INTRAVENOUS at 07:36

## 2023-03-09 RX ADMIN — PROPOFOL INJECTABLE EMULSION 200 MG: 10 INJECTION, EMULSION INTRAVENOUS at 07:36

## 2023-03-09 NOTE — ANESTHESIA POSTPROCEDURE EVALUATION
Patient: Blake Dennis    Procedure Summary     Date: 03/09/23 Room / Location: [facility] OR 1 /  [facility] OR    Anesthesia Start: 0730 Anesthesia Stop: 0950    Procedures:       umbilical hernia repair with laparoscopic bilateral inguinal hernia repair (Bilateral: Abdomen)      UMBILICAL HERNIA REPAIR (Abdomen) Diagnosis:       Umbilical hernia without obstruction and without gangrene      Bilateral inguinal hernia without obstruction or gangrene, recurrence not specified      (Umbilical hernia without obstruction and without gangrene [K42.9])      (Bilateral inguinal hernia without obstruction or gangrene, recurrence not specified [K40.20])    Surgeons: Rosalie Miller DO Provider: Zhanna Logan CRNA    Anesthesia Type: general ASA Status: 3          Anesthesia Type: general    Vitals  Vitals Value Taken Time   /80 03/09/23 1030   Temp 97.8 °F (36.6 °C) 03/09/23 0947   Pulse 59 03/09/23 1033   Resp 20 03/09/23 1025   SpO2 92 % 03/09/23 1033   Vitals shown include unvalidated device data.        Post Anesthesia Care and Evaluation    Patient location during evaluation: PHASE II  Patient participation: complete - patient participated  Level of consciousness: awake and alert  Pain score: 0  Pain management: satisfactory to patient    Airway patency: patent  Anesthetic complications: No anesthetic complications  PONV Status: none  Cardiovascular status: acceptable  Respiratory status: acceptable  Hydration status: acceptable    Comments: Late entry, patient seen prior to discharge.

## 2023-03-09 NOTE — OP NOTE
GENERAL SURGERY :  Paul Dennis  1975    Procedure Date: 03/09/23    Pre-op Diagnosis: Umbilical hernia and bilateral inguinal hernias    Post-op Diagnosis: Incarcerated umbilical hernia and bilateral inguinal hernias    Procedure: Laparoscopic bilateral inguinal hernia repairs and repair of incarcerated umbilical hernia    Surgeon: Paul    Assistant: Genevieve Galeas CSA was responsible for performing the following activities: Retraction, Closing, Placing Dressing and Held/Positioned Camera and their skilled assistance was necessary for the success of this case.      Estimated Blood Loss:  10 ml    Complications: None    Specimen: Hernia sac and fat    Findings: Blake had a 1 cm sized incarcerated umbilical hernia.  It had fat incarcerated in it which was removed.  He also had bilateral inguinal hernias.    Clinical Note: Blake presented with in anumbilical hernia as well as bilateral inguinal hernias.  We discussed the benefits of repairing these hernias laparoscopically.  Benefits and risks not limited to but including: Bleeding, infection, hernia recurrence, injury to intra-abdominal structures, anesthesia complications, pain, formation of hematoma seroma.  He appeared to understand and signed informed consent.    Procedure: Blake was taken to the operative suite and placed in the supine position.  He was placed under general anesthetic.  He was then prepped and draped in the usual sterile fashion.  After appropriate timeout was performed local was injected above the umbilicus and an incision was made.  Using Bovie cautery dissection was carried down until the anterior rectus fascia and umbilical hernia were noted.  The hernia was  from the vehicle stalk.  There was fat incarcerated in the hernia which was removed with Bovie cautery and passed off.  The Alarcon trocar was then placed into the defect and the abdomen was inflated.  A camera was inserted which revealed the 2  inguinal hernias.  Local was injected an additional 2 5 mm ports were placed under direct vision.  We started repairing the right side first.  Using laparoscopic scissors the fat and peritoneum were  from the abdominal wall starting medially and going laterally.  Using blunt dissection we created a flap until reached Tera's ligament medially.  We then opened up and I space laterally.  We then reduced the hernia defect.  This was quite tedious due to the large amount of fat.  We then placed a right-sided Bard ST mesh and opened it.  We placed 1 tack on Tera's ligament medially and 1 tack laterally.  We then tacked the fascia and peritoneum back up over the mesh.  We then took our attention to the left side.  We again opened the peritoneum and fat using cautery attached scissors.  Starting medially going laterally we started to make a flap.  Using blunt dissection we dissected down towards Tera's ligament medially and then we open the space laterally.  We then again reduced the hernia using blunt dissection.  We introduced a large 3D max mesh for the left side.  We placed 1 tack medially and another tack laterally.  There was excellent coverage of the hernia defect.  And then we covered the mesh with the fat and peritoneum.  We inspected the pelvis.  Both hernias appeared to be reduced nicely.  It should be noted that hemostasis was perfected using Surgicel powder on both hernia sides.  I then placed a medium sized Ventralex ST mesh into the Alarcon port and remove the Alarcon port.  We visualized the mesh lay nicely against the abdominal wall with the 5 mm camera.  We then remove the other 2 ports.  We then closed the fascia using 0 Vicryl suture.  The incisions were then closed with 4-0 Vicryl suture and sterile dressings were applied.  Blake then had his anesthesia reversed and he was extubated and brought to the recovery room in stable postoperative condition having tolerated his procedure  well.        Rosalie Miller, DO  09:38 EST

## 2023-03-09 NOTE — H&P
Blake Dennis 47 y.o. male presents for  FU/discuss CT.        HPI   Above noted and agree.  Blake is here following up from his CT scan.  He has an umbilical hernia and bilateral inguinal hernias.  He would like to have those repaired.  He tolerates a regular diet without nausea or vomiting.  He moves his bowels without difficulty.  He has no chest pain or shortness of breath.  He has no fevers or chills.  He does take Plavix for cardiac stents.  He saw his cardiologist in December.      Review of Systems   All other systems reviewed and are negative.          Past Medical History:   Diagnosis Date   • BMI 38.0-38.9,adult    • CAD (coronary artery disease)    • Chest pain    • Elevated cholesterol    • H/O heart artery stent     x2 2020 LAD   • Hypertension    • Sleep apnea     CPAP           Past Surgical History:   Procedure Laterality Date   • CARDIAC CATHETERIZATION N/A 4/27/2020    Procedure: Coronary angiography;  Surgeon: Mj Anne MD;  [place] [facility];  Service: Cardiovascular;  Laterality: N/A;   • CARDIAC CATHETERIZATION N/A 4/27/2020    Procedure: Left heart cath;  Surgeon: Mj Anne MD;  [facility];  Service: Cardiovascular;  Laterality: N/A;   • CARDIAC CATHETERIZATION N/A 4/27/2020    Procedure: Left ventriculography;  Surgeon: Mj Anne MD;  [facility];  Service: Cardiovascular;  Laterality: N/A;   • CARDIAC CATHETERIZATION N/A 4/27/2020    Procedure: Stent TANNER coronary;  Surgeon: Mj Anne MD;  [place] [facility] [place] [facility] [place];  Service: Cardiovascular;  Laterality: N/A;   • CARDIAC CATHETERIZATION N/A 4/21/2022    Procedure: Left Heart Cath;  Surgeon: Benjamin Hui MD;  [facility];  Service: Cardiovascular;  Laterality: N/A;   • CARDIAC CATHETERIZATION N/A 4/21/2022    Procedure: Coronary angiography;  Surgeon: Benjamin Hui MD;   "Location: [facility] CATH INVASIVE LOCATION;  Service: Cardiovascular;  Laterality: N/A;   • CARDIAC CATHETERIZATION N/A 4/21/2022    Procedure: Left ventriculography;  Surgeon: Benjamin Hui MD;  Location: [facility] CATH INVASIVE LOCATION;  Service: Cardiovascular;  Laterality: N/A;           Physical Exam  Vitals and nursing note reviewed.   Constitutional:       Appearance: Normal appearance.   HENT:      Head: Normocephalic and atraumatic.   Cardiovascular:      Rate and Rhythm: Normal rate and regular rhythm.   Pulmonary:      Effort: Pulmonary effort is normal.      Breath sounds: Normal breath sounds.   Abdominal:      General: Bowel sounds are normal.      Palpations: Abdomen is soft.   Musculoskeletal:         General: No swelling or tenderness.   Skin:     General: Skin is warm and dry.   Neurological:      General: No focal deficit present.      Mental Status: He is alert and oriented to person, place, and time.   Psychiatric:         Mood and Affect: Mood normal.         Behavior: Behavior normal.         No debilities noted    /72   Pulse 72   Resp 16   Ht 180.3 cm (71\")   Wt 127 kg (281 lb)   BMI 39.19 kg/m²         Diagnoses and all orders for this visit:    1. Umbilical hernia without obstruction and without gangrene (Primary)    2. Bilateral inguinal hernia without obstruction or gangrene, recurrence not specified           We discussed performing an umbilical hernia repair with laparoscopic bilateral inguinal hernia repair.  Benefits and risks not limited to but including:  Bleeding, infection, hernia recurrence, testicular atrophy, inguinal pain, numbness to the leg or scrotal area, anesthesia complications.  The patient appeared to understand and is willing to proceed.    Thank you for allowing me to participate in the care of this interesting patient.        "

## 2023-03-09 NOTE — ANESTHESIA PREPROCEDURE EVALUATION
Anesthesia Evaluation     Patient summary reviewed and Nursing notes reviewed   no history of anesthetic complications:  NPO Solid Status: > 8 hours  NPO Liquid Status: > 8 hours           Airway   Mallampati: II  TM distance: >3 FB  Neck ROM: full  No difficulty expected  Dental      Pulmonary     breath sounds clear to auscultation  (+) sleep apnea on CPAP,   Cardiovascular   Exercise tolerance: good (4-7 METS)    ECG reviewed  PT is on anticoagulation therapy  Patient on routine beta blocker and Beta blocker given within 24 hours of surgery  Rhythm: regular  Rate: normal    (+) hypertension well controlled less than 2 medications, CAD, cardiac stents (x2 2020 STNT XIENCE SMILEY EVEROLIMUS TANNER 3X15MM) more than 12 months ago hyperlipidemia,     ROS comment: Interpretation Summary    ? Left ventricular ejection fraction appears to be 61 - 65%. Left ventricular systolic function is normal. Normal left ventricular cavity size and wall thickness noted. All left ventricular wall segments contract normally. No evidence of left ventricular thrombus or mass present.  Narrative & Impression      HEART RATE= 56  bpm  RR Interval= 1064  ms  WI Interval= 185  ms  P Horizontal Axis= 31  deg  P Front Axis= 40  deg  QRSD Interval= 107  ms  QT Interval= 407  ms  QRS Axis= 79  deg  T Wave Axis= 46  deg  - NORMAL ECG -  Sinus rhythm  ST elev, probable normal early repol pattern  No change from prior tracing  Electronically Signed By: Yessica Hernandez ([facility]) 03-Mar-2023 15:56:25  Date and Time of Study: 2023-03-03 14:05:38    Specimen Collected: 03/03/23 14:05 EST Last Resulted: 03/03/23 15:56 EST    Interpretation Summary    ? Estimated left ventricular EF = 69% Left ventricular systolic function is normal.  ? Left ventricular diastolic function was normal.  ? Estimated right ventricular systolic pressure from tricuspid regurgitation is normal (<35 mmHg).  ? Normal stress echo with no significant echocardiographic evidence for  myocardial ischemia.    CORONARY ANGIOGRAPHY:  Left main: Normal  Left anterior descending: 10 to 20% proximal luminal irregularities, stent in the mid LAD is widely patent and normal distally there is a mild 20% disease and a 40% apical lesion there is a third diagonal covered by a stent with a 90% origin lesion in it probably it has not changed  Ramus intermedius:Not present  Circumflex: 20% mid 10% OM1 disease  RCA: Is a dominant vessel.  10% proximal mid and distal LAD     SUMMARY: Normal LV function small inferior wall motion abnormality not really sure the etiology of that because his right coronary artery is fine and his circumflex is also may be technique related.  Stent is patent and looks good mild nonobstructive disease otherwise     RECOMMENDATIONS: Risk factor modification empiric GI therapy follow-up with his primary care physician        Neuro/Psych- negative ROS  GI/Hepatic/Renal/Endo    (+) morbid obesity,      Musculoskeletal (-) negative ROS    Abdominal   (+) obese,    Substance History - negative use     OB/GYN          Other - negative ROS                       Anesthesia Plan    ASA 3     general     intravenous induction     Anesthetic plan, risks, benefits, and alternatives have been provided, discussed and informed consent has been obtained with: patient.    Use of blood products discussed with patient  Consented to blood products.       CODE STATUS:

## 2023-03-09 NOTE — ANESTHESIA PROCEDURE NOTES
Airway  Urgency: elective    Date/Time: 3/9/2023 7:37 AM  End Time:3/9/2023 7:37 AM  Airway not difficult    General Information and Staff    Patient location during procedure: OR  CRNA/CAA: Zhanna Logan CRNA    Indications and Patient Condition  Indications for airway management: airway protection    Preoxygenated: yes  Mask difficulty assessment: 0 - not attempted    Final Airway Details  Final airway type: endotracheal airway      Successful airway: ETT  Cuffed: yes   Successful intubation technique: direct laryngoscopy  Facilitating devices/methods: intubating stylet  Endotracheal tube insertion site: oral  Blade: Dennis  Blade size: 2  ETT size (mm): 7.5  Cormack-Lehane Classification: grade I - full view of glottis  Measured from: lips  ETT/EBT  to lips (cm): 22  Number of attempts at approach: 1  Assessment: lips, teeth, and gum same as pre-op and atraumatic intubation

## 2023-03-14 LAB
LAB AP CASE REPORT: NORMAL
PATH REPORT.FINAL DX SPEC: NORMAL
PATH REPORT.GROSS SPEC: NORMAL

## 2023-03-17 ENCOUNTER — HOSPITAL ENCOUNTER (OUTPATIENT)
Dept: MRI IMAGING | Facility: HOSPITAL | Age: 48
Discharge: HOME | End: 2023-03-17
Admitting: NURSE PRACTITIONER
Payer: COMMERCIAL

## 2023-03-17 DIAGNOSIS — S86.892A MEDIAL TIBIAL STRESS SYNDROME, LEFT, INITIAL ENCOUNTER: ICD-10-CM

## 2023-03-17 PROCEDURE — 73718 MRI LOWER EXTREMITY W/O DYE: CPT

## 2023-03-22 ENCOUNTER — OFFICE VISIT (OUTPATIENT)
Dept: SURGERY | Facility: CLINIC | Age: 48
End: 2023-03-22
Payer: COMMERCIAL

## 2023-03-22 DIAGNOSIS — Z98.890 STATUS POST HERNIA REPAIR: Primary | ICD-10-CM

## 2023-03-22 DIAGNOSIS — Z87.19 STATUS POST HERNIA REPAIR: Primary | ICD-10-CM

## 2023-03-22 PROCEDURE — 99024 POSTOP FOLLOW-UP VISIT: CPT | Performed by: SURGERY

## 2023-03-22 NOTE — PROGRESS NOTES
Blake Dennis 48 y.o. male presents for PO FU UHR and BIH repair 03/09/2023.  Pt feels well.       HPI   Above noted and agree.      Review of Systems        Past Medical History:   Diagnosis Date   • Antiplatelet or antithrombotic long-term use     Plavix   • BMI 38.0-38.9,adult    • CAD (coronary artery disease) 2020    h/o stents x2, Dr Torres follows   • Chest pain    • Elevated cholesterol    • H/O heart artery stent     x2 2020 LAD   • Hypertension    • Sleep apnea     CPAP           Past Surgical History:   Procedure Laterality Date   • CARDIAC CATHETERIZATION N/A 4/27/2020    Procedure: Coronary angiography;  Surgeon: Mj Anne MD;  Location:  [facility] CATH INVASIVE LOCATION;  Service: Cardiovascular;  Laterality: N/A;   • CARDIAC CATHETERIZATION N/A 4/27/2020    Procedure: Left heart cath;  Surgeon: Mj Anne MD;  Location:  [facility] CATH INVASIVE LOCATION;  Service: Cardiovascular;  Laterality: N/A;   • CARDIAC CATHETERIZATION N/A 4/27/2020    Procedure: Left ventriculography;  Surgeon: Mj Anne MD;  [facility];  Service: Cardiovascular;  Laterality: N/A;   • CARDIAC CATHETERIZATION N/A 4/27/2020    Procedure: Stent TANNER coronary;  Surgeon: Mj Anne MD;  [facility];  Service: Cardiovascular;  Laterality: N/A;   • CARDIAC CATHETERIZATION N/A 4/21/2022    Procedure: Left Heart Cath;  Surgeon: Benjamin Hui MD;  [facility];  Service: Cardiovascular;  Laterality: N/A;   • CARDIAC CATHETERIZATION N/A 4/21/2022    Procedure: Coronary angiography;  Surgeon: Benjamin Hui MD;  [facility];  Service: Cardiovascular;  Laterality: N/A;   • CARDIAC CATHETERIZATION N/A 4/21/2022    Procedure: Left ventriculography;  Surgeon: Benjamin Hui MD;  [facility];  Service: Cardiovascular;  Laterality: N/A;   • INGUINAL HERNIA REPAIR Bilateral  3/9/2023    Procedure: umbilical hernia repair with laparoscopic bilateral inguinal hernia repair;  Surgeon: Rosalie Miller DO;  Location:  [facility] OR;  Service: General;  Laterality: Bilateral;   • UMBILICAL HERNIA REPAIR N/A 3/9/2023    Procedure: UMBILICAL HERNIA REPAIR;  Surgeon: Rosalie Miller DO;  Location:  [facility] OR;  Service: General;  Laterality: N/A;           Physical Exam    General:  Awake and alert with no acute distress  Eyes:  No icterus  Abdomen:  Soft, non-tender  Incision:  Clean, dry, intact    There were no vitals taken for this visit.        Diagnoses and all orders for this visit:    1. Status post hernia repair (Primary)    Blake may return anytime as needed.    Thank you for allowing me to participate in the care of this interesting patient.

## 2023-04-21 DIAGNOSIS — S86.892A MEDIAL TIBIAL STRESS SYNDROME, LEFT, INITIAL ENCOUNTER: ICD-10-CM

## 2023-04-21 DIAGNOSIS — R20.2 PARESTHESIA OF LEFT LOWER EXTREMITY: Primary | ICD-10-CM

## 2023-05-08 ENCOUNTER — APPOINTMENT (OUTPATIENT)
Dept: CT IMAGING | Facility: HOSPITAL | Age: 48
End: 2023-05-08
Payer: COMMERCIAL

## 2023-05-08 ENCOUNTER — APPOINTMENT (OUTPATIENT)
Dept: GENERAL RADIOLOGY | Facility: HOSPITAL | Age: 48
End: 2023-05-08
Payer: COMMERCIAL

## 2023-05-08 ENCOUNTER — HOSPITAL ENCOUNTER (EMERGENCY)
Facility: HOSPITAL | Age: 48
Discharge: HOME | End: 2023-05-08
Attending: EMERGENCY MEDICINE | Admitting: EMERGENCY MEDICINE
Payer: COMMERCIAL

## 2023-05-08 VITALS
RESPIRATION RATE: 18 BRPM | BODY MASS INDEX: 41.19 KG/M2 | SYSTOLIC BLOOD PRESSURE: 138 MMHG | DIASTOLIC BLOOD PRESSURE: 88 MMHG | WEIGHT: 287.7 LBS | HEIGHT: 70 IN | OXYGEN SATURATION: 99 % | HEART RATE: 52 BPM | TEMPERATURE: 98.7 F

## 2023-05-08 DIAGNOSIS — R07.9 CHEST PAIN, UNSPECIFIED TYPE: ICD-10-CM

## 2023-05-08 DIAGNOSIS — R20.2 LEFT HAND PARESTHESIA: ICD-10-CM

## 2023-05-08 DIAGNOSIS — R51.9 NONINTRACTABLE HEADACHE, UNSPECIFIED CHRONICITY PATTERN, UNSPECIFIED HEADACHE TYPE: Primary | ICD-10-CM

## 2023-05-08 LAB
ALBUMIN SERPL-MCNC: 4.7 G/DL (ref 3.5–5.2)
ALBUMIN/GLOB SERPL: 1.6 G/DL
ALP SERPL-CCNC: 51 U/L (ref 39–117)
ALT SERPL W P-5'-P-CCNC: 30 U/L (ref 1–41)
ANION GAP SERPL CALCULATED.3IONS-SCNC: 10.5 MMOL/L (ref 5–15)
APTT PPP: 28.1 SECONDS (ref 24.3–38.1)
AST SERPL-CCNC: 22 U/L (ref 1–40)
BASOPHILS # BLD AUTO: 0.05 10*3/MM3 (ref 0–0.2)
BASOPHILS NFR BLD AUTO: 0.6 % (ref 0–1.5)
BILIRUB SERPL-MCNC: 0.5 MG/DL (ref 0–1.2)
BUN SERPL-MCNC: 15 MG/DL (ref 6–20)
BUN/CREAT SERPL: 16.1 (ref 7–25)
CALCIUM SPEC-SCNC: 9.9 MG/DL (ref 8.6–10.5)
CHLORIDE SERPL-SCNC: 105 MMOL/L (ref 98–107)
CO2 SERPL-SCNC: 25.5 MMOL/L (ref 22–29)
CREAT SERPL-MCNC: 0.93 MG/DL (ref 0.76–1.27)
D DIMER PPP FEU-MCNC: 0.53 MCGFEU/ML (ref 0–0.5)
DEPRECATED RDW RBC AUTO: 39.5 FL (ref 37–54)
EGFRCR SERPLBLD CKD-EPI 2021: 102.6 ML/MIN/1.73
EOSINOPHIL # BLD AUTO: 0.03 10*3/MM3 (ref 0–0.4)
EOSINOPHIL NFR BLD AUTO: 0.3 % (ref 0.3–6.2)
ERYTHROCYTE [DISTWIDTH] IN BLOOD BY AUTOMATED COUNT: 12.8 % (ref 12.3–15.4)
GLOBULIN UR ELPH-MCNC: 2.9 GM/DL
GLUCOSE BLDC GLUCOMTR-MCNC: 102 MG/DL (ref 70–130)
GLUCOSE SERPL-MCNC: 99 MG/DL (ref 65–99)
HCT VFR BLD AUTO: 44.5 % (ref 37.5–51)
HGB BLD-MCNC: 15 G/DL (ref 13–17.7)
HOLD SPECIMEN: NORMAL
HOLD SPECIMEN: NORMAL
IMM GRANULOCYTES # BLD AUTO: 0.02 10*3/MM3 (ref 0–0.05)
IMM GRANULOCYTES NFR BLD AUTO: 0.2 % (ref 0–0.5)
INR PPP: 1.02 (ref 0.9–1.1)
LYMPHOCYTES # BLD AUTO: 1.17 10*3/MM3 (ref 0.7–3.1)
LYMPHOCYTES NFR BLD AUTO: 13.2 % (ref 19.6–45.3)
MCH RBC QN AUTO: 28.9 PG (ref 26.6–33)
MCHC RBC AUTO-ENTMCNC: 33.7 G/DL (ref 31.5–35.7)
MCV RBC AUTO: 85.7 FL (ref 79–97)
MONOCYTES # BLD AUTO: 0.65 10*3/MM3 (ref 0.1–0.9)
MONOCYTES NFR BLD AUTO: 7.3 % (ref 5–12)
NEUTROPHILS NFR BLD AUTO: 6.96 10*3/MM3 (ref 1.7–7)
NEUTROPHILS NFR BLD AUTO: 78.4 % (ref 42.7–76)
NRBC BLD AUTO-RTO: 0 /100 WBC (ref 0–0.2)
NT-PROBNP SERPL-MCNC: 94.6 PG/ML (ref 0–450)
PLATELET # BLD AUTO: 286 10*3/MM3 (ref 140–450)
PMV BLD AUTO: 10.5 FL (ref 6–12)
POTASSIUM SERPL-SCNC: 4.5 MMOL/L (ref 3.5–5.2)
PROT SERPL-MCNC: 7.6 G/DL (ref 6–8.5)
PROTHROMBIN TIME: 13.5 SECONDS (ref 12.1–15)
QT INTERVAL: 401 MS
RBC # BLD AUTO: 5.19 10*6/MM3 (ref 4.14–5.8)
SODIUM SERPL-SCNC: 141 MMOL/L (ref 136–145)
TROPONIN T SERPL HS-MCNC: 11 NG/L
TROPONIN T SERPL HS-MCNC: 12 NG/L
WBC NRBC COR # BLD: 8.88 10*3/MM3 (ref 3.4–10.8)
WHOLE BLOOD HOLD COAG: NORMAL
WHOLE BLOOD HOLD SPECIMEN: NORMAL

## 2023-05-08 PROCEDURE — 25510000001 IOPAMIDOL PER 1 ML: Performed by: EMERGENCY MEDICINE

## 2023-05-08 PROCEDURE — 70498 CT ANGIOGRAPHY NECK: CPT

## 2023-05-08 PROCEDURE — 93005 ELECTROCARDIOGRAM TRACING: CPT

## 2023-05-08 PROCEDURE — 85730 THROMBOPLASTIN TIME PARTIAL: CPT

## 2023-05-08 PROCEDURE — 99284 EMERGENCY DEPT VISIT MOD MDM: CPT

## 2023-05-08 PROCEDURE — 80053 COMPREHEN METABOLIC PANEL: CPT

## 2023-05-08 PROCEDURE — 85610 PROTHROMBIN TIME: CPT

## 2023-05-08 PROCEDURE — 71045 X-RAY EXAM CHEST 1 VIEW: CPT

## 2023-05-08 PROCEDURE — 70496 CT ANGIOGRAPHY HEAD: CPT

## 2023-05-08 PROCEDURE — 84484 ASSAY OF TROPONIN QUANT: CPT | Performed by: EMERGENCY MEDICINE

## 2023-05-08 PROCEDURE — 85379 FIBRIN DEGRADATION QUANT: CPT | Performed by: EMERGENCY MEDICINE

## 2023-05-08 PROCEDURE — 84484 ASSAY OF TROPONIN QUANT: CPT

## 2023-05-08 PROCEDURE — 82948 REAGENT STRIP/BLOOD GLUCOSE: CPT

## 2023-05-08 PROCEDURE — 85025 COMPLETE CBC W/AUTO DIFF WBC: CPT

## 2023-05-08 PROCEDURE — 36415 COLL VENOUS BLD VENIPUNCTURE: CPT

## 2023-05-08 PROCEDURE — 83880 ASSAY OF NATRIURETIC PEPTIDE: CPT | Performed by: EMERGENCY MEDICINE

## 2023-05-08 PROCEDURE — 0042T HC CT CEREBRAL PERFUSION W/WO CONTRAST: CPT

## 2023-05-08 PROCEDURE — 70450 CT HEAD/BRAIN W/O DYE: CPT

## 2023-05-08 RX ORDER — SODIUM CHLORIDE 0.9 % (FLUSH) 0.9 %
10 SYRINGE (ML) INJECTION AS NEEDED
Status: DISCONTINUED | OUTPATIENT
Start: 2023-05-08 | End: 2023-05-08 | Stop reason: HOSPADM

## 2023-05-08 RX ADMIN — IOPAMIDOL 150 ML: 755 INJECTION, SOLUTION INTRAVENOUS at 14:40

## 2023-05-08 NOTE — ED PROVIDER NOTES
Subjective   History of Present Illness    Review of Systems    Past Medical History:   Diagnosis Date   • Antiplatelet or antithrombotic long-term use     Plavix   • BMI 38.0-38.9,adult    • CAD (coronary artery disease) 2020    h/o stents x2, Dr Torres follows   • Chest pain    • Elevated cholesterol    • H/O heart artery stent     x2 2020 LAD   • Hypertension    • Sleep apnea     CPAP       No Known Allergies    Past Surgical History:   Procedure Laterality Date   • CARDIAC CATHETERIZATION N/A 4/27/2020    Procedure: Coronary angiography;  Surgeon: Mj Anne MD;  [facility];  Service: Cardiovascular;  Laterality: N/A;   • CARDIAC CATHETERIZATION N/A 4/27/2020    Procedure: Left heart cath;  Surgeon: Mj Anne MD;  [facility];  Service: Cardiovascular;  Laterality: N/A;   • CARDIAC CATHETERIZATION N/A 4/27/2020    Procedure: Left ventriculography;  Surgeon: Mj Anne MD;  [facility];  Service: Cardiovascular;  Laterality: N/A;   • CARDIAC CATHETERIZATION N/A 4/27/2020    Procedure: Stent TANNER coronary;  Surgeon: Mj Anne MD;  [facility];  Service: Cardiovascular;  Laterality: N/A;   • CARDIAC CATHETERIZATION N/A 4/21/2022    Procedure: Left Heart Cath;  Surgeon: Benjamin Hui MD;  [facility];  Service: Cardiovascular;  Laterality: N/A;   • CARDIAC CATHETERIZATION N/A 4/21/2022    Procedure: Coronary angiography;  Surgeon: Benjamin Hui MD;  [facility];  Service: Cardiovascular;  Laterality: N/A;   • CARDIAC CATHETERIZATION N/A 4/21/2022    Procedure: Left ventriculography;  Surgeon: Benjamin Hui MD;  Location: [facility] CATH INVASIVE LOCATION;  Service: Cardiovascular;  Laterality: N/A;   • INGUINAL HERNIA REPAIR Bilateral 3/9/2023    Procedure: umbilical hernia repair with laparoscopic bilateral  inguinal hernia repair;  Surgeon: Rosalie Miller DO;  Location:  [facility] OR;  Service: General;  Laterality: Bilateral;   • UMBILICAL HERNIA REPAIR N/A 3/9/2023    Procedure: UMBILICAL HERNIA REPAIR;  Surgeon: Rosalie Miller DO;  Location:  [facility] OR;  Service: General;  Laterality: N/A;       Family History   Problem Relation Age of Onset   • Hypertension Mother    • Cancer Mother    • Malig Hyperthermia Neg Hx        Social History     Socioeconomic History   • Marital status:      Spouse name: Kaylene Dennis   • Number of children: 2   • Years of education: 12   • Highest education level: High school graduate   Tobacco Use   • Smoking status: Never   • Smokeless tobacco: Never   • Tobacco comments:     caffeine use 1 cup coffee daily   Vaping Use   • Vaping Use: Never used   Substance and Sexual Activity   • Alcohol use: Not Currently     Alcohol/week: 6.0 standard drinks     Types: 6 Shots of liquor per week     Comment: very rarely   • Drug use: Never   • Sexual activity: Defer           Objective   Physical Exam  Vitals (The temperature is 98.7 °F, pulse 57, respirations 18, /85, room air pulse ox 98%.) and nursing note reviewed.   Constitutional:       Appearance: He is well-developed.   HENT:      Head: Normocephalic.   Cardiovascular:      Rate and Rhythm: Normal rate and regular rhythm.      Heart sounds: Normal heart sounds. No murmur heard.    No friction rub. No gallop.   Pulmonary:      Effort: Pulmonary effort is normal.   Chest:      Chest wall: No tenderness.   Abdominal:      General: Bowel sounds are normal.      Palpations: Abdomen is soft. There is no mass.      Tenderness: There is no abdominal tenderness. There is no guarding or rebound.   Musculoskeletal:      Cervical back: Normal range of motion and neck supple.      Right lower leg: No edema.      Left lower leg: No edema.   Skin:     General: Skin is warm and dry.   Neurological:      General: No focal deficit  present.      Mental Status: He is alert and oriented to person, place, and time.      Cranial Nerves: No cranial nerve deficit.      Motor: No weakness.   Psychiatric:         Mood and Affect: Mood normal.         Procedures           ED Course  ED Course as of 05/08/23 1649   Mon May 08, 2023   1416 CBC is unremarkable except for a neutrophil count of 78%. [RC]   1429 The CMP is unremarkable [RC]   1429 PT T is 28 and normal.    The PT is 13.5 [RC]   1429  and INR 1.02.  These are normal.    The high-sensitivity troponin is 12 and normal [RC]   1432 The D-dimer is 0.53 and essentially normal [RC]   1500 Care excepted from Dr. Lopes.  Patient here and did have a stroke evaluation.  Patient's currently waiting on reads of CTs and repeat troponin. [AW]      ED Course User Index  [AW] Sebastian Aguirre MD  [RC] Pop Lopes MD                                           [facility]    Final diagnoses:   None       ED Disposition  ED Disposition     None          No follow-up provider specified.       Medication List      No changes were made to your prescriptions during this visit.

## 2023-05-08 NOTE — ED PROVIDER NOTES
Subjective   History of Present Illness  History of Present Illness    Chief complaint: Chest pain and finger numbness    Location: Left-sided chest pain, right finger    Quality/Severity: Multiple fingers right hand, states that it is like a dull ache    Timing/Onset/Duration: Started at 9:00 this more    Modifying Factors: The tingling in the hand is resolved now, patient has had persistent left-sided chest pain since 9 AM    Associated Symptoms: Patient complains of occipital headache that comes and goes, not worst headache of the life, headache radiates down the back of the neck, no shortness of breath.  No abdominal pain.  No diarrhea or burning when he urinates.  No nausea or vomiting    Narrative: This 48-year-old white male was at work today and went to grab a doorknob and noticed that his fingers on his right hand were numb.  Patient had left-sided chest pain.  The patient has a history of coronary artery disease with stents.  He states that this pain in his chest was somewhat like the pain that he had prior to getting his stents.    PCP:  Herminia Simmons APRN       Review of Systems   Constitutional: Negative for chills and fever.   HENT: Negative for congestion, ear pain and sore throat.    Respiratory: Negative for shortness of breath.    Cardiovascular: Positive for chest pain.   Gastrointestinal: Negative for abdominal pain, diarrhea, nausea and vomiting.   Genitourinary: Negative for dysuria.   Musculoskeletal: Negative for back pain.   Skin: Negative for rash.   Neurological: Positive for numbness and headaches. Negative for weakness.   Psychiatric/Behavioral: Negative for confusion.        Medication List      ASK your doctor about these medications    ALBUTEROL IN     atorvastatin 20 MG tablet  Commonly known as: LIPITOR     cholecalciferol 25 MCG (1000 UT) tablet  Commonly known as: VITAMIN D3     clopidogrel 75 MG tablet  Commonly known as: PLAVIX  TAKE 1 TABLET DAILY     fish oil 1000 MG  capsule capsule     HYDROcodone-acetaminophen 5-325 MG per tablet  Commonly known as: Norco  Take 1 tablet by mouth Every 4 (Four) Hours As Needed for Moderate Pain or Severe Pain.     metoprolol tartrate 25 MG tablet  Commonly known as: LOPRESSOR  TAKE 1 TABLET TWICE A DAY     nitroglycerin 0.4 MG SL tablet  Commonly known as: NITROSTAT  Place 1 tablet under the tongue Every 5 (Five) Minutes As Needed for Chest Pain. Place one tablet under tongue as needed for chest pain.     Vitamin B-12 5000 MCG lozenge            Past Medical History:   Diagnosis Date   • Antiplatelet or antithrombotic long-term use     Plavix   • BMI 38.0-38.9,adult    • CAD (coronary artery disease) 2020    h/o stents x2, Dr Torres follows   • Chest pain    • Elevated cholesterol    • H/O heart artery stent     x2 2020 LAD   • Hypertension    • Sleep apnea     CPAP       No Known Allergies    Past Surgical History:   Procedure Laterality Date   • CARDIAC CATHETERIZATION N/A 4/27/2020    Procedure: Coronary angiography;  Surgeon: Mj Anne MD;  Location: [facility] CATH INVASIVE LOCATION;  Service: Cardiovascular;  Laterality: N/A;   • CARDIAC CATHETERIZATION N/A 4/27/2020    Procedure: Left heart cath;  Surgeon: Mj Anne MD;  Location: [facility] CATH INVASIVE LOCATION;  Service: Cardiovascular;  Laterality: N/A;   • CARDIAC CATHETERIZATION N/A 4/27/2020    Procedure: Left ventriculography;  Surgeon: Mj Anne MD;  Location: [facility] CATH INVASIVE LOCATION;  Service: Cardiovascular;  Laterality: N/A;   • CARDIAC CATHETERIZATION N/A 4/27/2020    Procedure: Stent TANNER coronary;  Surgeon: Mj Anne MD;  Location: [facility] CATH INVASIVE LOCATION;  Service: Cardiovascular;  Laterality: N/A;   • CARDIAC CATHETERIZATION N/A 4/21/2022    Procedure: Left Heart Cath;  Surgeon: Benjamin Hui MD;  Location: [facility] CATH INVASIVE LOCATION;  Service: Cardiovascular;  Laterality: N/A;   • CARDIAC CATHETERIZATION N/A 4/21/2022     Procedure: Coronary angiography;  Surgeon: Benjamin Hui MD;  Location:  [facility] CATH INVASIVE LOCATION;  Service: Cardiovascular;  Laterality: N/A;   • CARDIAC CATHETERIZATION N/A 4/21/2022    Procedure: Left ventriculography;  Surgeon: Benjamin Hui MD;  Location:  [facility] CATH INVASIVE LOCATION;  Service: Cardiovascular;  Laterality: N/A;   • INGUINAL HERNIA REPAIR Bilateral 3/9/2023    Procedure: umbilical hernia repair with laparoscopic bilateral inguinal hernia repair;  Surgeon: Rosalie Miller DO;  Location:  [facility] OR;  Service: General;  Laterality: Bilateral;   • UMBILICAL HERNIA REPAIR N/A 3/9/2023    Procedure: UMBILICAL HERNIA REPAIR;  Surgeon: Rosalie Miller DO;  Location:  [facility] OR;  Service: General;  Laterality: N/A;       Family History   Problem Relation Age of Onset   • Hypertension Mother    • Cancer Mother    • Malig Hyperthermia Neg Hx        Social History     Socioeconomic History   • Marital status:      Spouse name: Kaylene Dennis   • Number of children: 2   • Years of education: 12   • Highest education level: High school graduate   Tobacco Use   • Smoking status: Never   • Smokeless tobacco: Never   • Tobacco comments:     caffeine use 1 cup coffee daily   Vaping Use   • Vaping Use: Never used   Substance and Sexual Activity   • Alcohol use: Not Currently     Alcohol/week: 6.0 standard drinks     Types: 6 Shots of liquor per week     Comment: very rarely   • Drug use: Never   • Sexual activity: Defer           Objective   Physical Exam  Vitals (The temperature is 98.7 °F, pulse 57, respirations 18, /85, room air pulse ox 98%.) and nursing note reviewed.   Constitutional:       Appearance: He is well-developed.   HENT:      Head: Normocephalic.   Cardiovascular:      Rate and Rhythm: Normal rate and regular rhythm.      Heart sounds: Normal heart sounds. No murmur heard.    No friction rub. No gallop.   Pulmonary:      Effort: Pulmonary effort is normal.   Chest:       Chest wall: No tenderness.   Abdominal:      General: Bowel sounds are normal.      Palpations: Abdomen is soft. There is no mass.      Tenderness: There is no abdominal tenderness. There is no guarding or rebound.   Musculoskeletal:      Cervical back: Normal range of motion and neck supple.      Right lower leg: No edema.      Left lower leg: No edema.   Skin:     General: Skin is warm and dry.   Neurological:      General: No focal deficit present.      Mental Status: He is alert and oriented to person, place, and time.      Cranial Nerves: No cranial nerve deficit.      Motor: No weakness.   Psychiatric:         Mood and Affect: Mood normal.         Procedures           ED Course  ED Course as of 05/13/23 1648   Mon May 08, 2023   1416 CBC is unremarkable except for a neutrophil count of 78%. [RC]   1429 The CMP is unremarkable [RC]   1429 PT T is 28 and normal.    The PT is 13.5 [RC]   1429  and INR 1.02.  These are normal.    The high-sensitivity troponin is 12 and normal [RC]   1432 The D-dimer is 0.53 and essentially normal [RC]   1500 Care excepted from Dr. Lopes.  Patient here and did have a stroke evaluation.  Patient's currently waiting on reads of CTs and repeat troponin. [AW]      ED Course User Index  [AW] Sebastian Aguirre MD  [RC] Pop Lopes MD      14:25 EDT, 05/08/23:  The EKG was obtained at 1359 read by me at 1400.  EKG shows a sinus bradycardia with a rate of 57.  There is a normal axis with no hypertrophy.  The NE, QRS, QT intervals are unremarkable.  There is no ectopy.  There is ST elevation consistent with early repole pattern.  There is no acute ST elevation or depression.  The EKG today compared to EKG dated 3/3/2023 is essentially unchanged.    15:01 EDT, 05/08/23:  I spoke with Dr. Rivers, on-call for the stroke neurology group, he indicated they will consult on the patient if needed.     15:02 EDT, 05/08/23:  The case was discussed with Dr. Aguirre and turned over  to him at 1500 hrs..  We are awaiting the CT results.     15:07 EDT, 05/08/23:  Dr. Rivers reviewed the scans and felt that the finding on the cerebral perfusion was likely artifactual..                           MDM    Final diagnoses:   Nonintractable headache, unspecified chronicity pattern, unspecified headache type   Left hand paresthesia   Chest pain, unspecified type       ED Disposition  ED Disposition     None          No follow-up provider specified.       Medication List      No changes were made to your prescriptions during this visit.          Pop Lopes MD  05/08/23 6589       Pop Lopes MD  05/13/23 9331

## 2023-05-08 NOTE — ED PROVIDER NOTES
Subjective   History of Present Illness    Review of Systems    Past Medical History:   Diagnosis Date   • Antiplatelet or antithrombotic long-term use     Plavix   • BMI 38.0-38.9,adult    • CAD (coronary artery disease) 2020    h/o stents x2, Dr Torres follows   • Chest pain    • Elevated cholesterol    • H/O heart artery stent     x2 2020 LAD   • Hypertension    • Sleep apnea     CPAP       No Known Allergies    Past Surgical History:   Procedure Laterality Date   • CARDIAC CATHETERIZATION N/A 4/27/2020    Procedure: Coronary angiography;  Surgeon: Mj Anne MD;  Location: [facility] CATH INVASIVE LOCATION;  Service: Cardiovascular;  Laterality: N/A;   • CARDIAC CATHETERIZATION N/A 4/27/2020    Procedure: Left heart cath;  Surgeon: Mj Anne MD;  Location: [facility] CATH [facility]VASIVE LOCATION;  Service: Cardiovascular;  Laterality: N/A;   • CARDIAC CATHETERIZATION N/A 4/27/2020    Procedure: Left ventriculography;  Surgeon: Mj Anne MD;  Location: [facility] CATH [facility]VASIVE LOCATION;  Service: Cardiovascular;  Laterality: N/A;   • CARDIAC CATHETERIZATION N/A 4/27/2020    Procedure: Stent TANNER coronary;  Surgeon: Mj Anne MD;  Location: [facility] CATH INVASIVE LOCATION;  Service: Cardiovascular;  Laterality: N/A;   • CARDIAC CATHETERIZATION N/A 4/21/2022    Procedure: Left Heart Cath;  Surgeon: Benjamin Hui MD;  Location: [facility] CATH [facility]VASIVE LOCATION;  Service: Cardiovascular;  Laterality: N/A;   • CARDIAC CATHETERIZATION N/A 4/21/2022    Procedure: Coronary angiography;  Surgeon: Benjamin Hui MD;  Location: [facility] CATH INVASIVE LOCATION;  Service: Cardiovascular;  Laterality: N/A;   • CARDIAC CATHETERIZATION N/A 4/21/2022    Procedure: Left ventriculography;  Surgeon: Benjamin Hui MD;  Location: [facility] CATH INVASIVE LOCATION;  Service: Cardiovascular;  Laterality: N/A;   • INGUINAL HERNIA REPAIR Bilateral 3/9/2023    Procedure: umbilical hernia repair with laparoscopic bilateral  inguinal hernia repair;  Surgeon: Rosalie Miller DO;  Location:  [facility];  Service: General;  Laterality: Bilateral;   • UMBILICAL HERNIA REPAIR N/A 3/9/2023    Procedure: UMBILICAL HERNIA REPAIR;  Surgeon: Rosalie Miller DO;  Location:  [facility] OR;  Service: General;  Laterality: N/A;       Family History   Problem Relation Age of Onset   • Hypertension Mother    • Cancer Mother    • Malig Hyperthermia Neg Hx        Social History     Socioeconomic History   • Marital status:      Spouse name: Kaylene Dennis   • Number of children: 2   • Years of education: 12   • Highest education level: High school graduate   Tobacco Use   • Smoking status: Never   • Smokeless tobacco: Never   • Tobacco comments:     caffeine use 1 cup coffee daily   Vaping Use   • Vaping Use: Never used   Substance and Sexual Activity   • Alcohol use: Not Currently     Alcohol/week: 6.0 standard drinks     Types: 6 Shots of liquor per week     Comment: very rarely   • Drug use: Never   • Sexual activity: Defer           Objective   Physical Exam    Procedures           ED Course  ED Course as of 05/08/23 1749   Mon May 08, 2023   1416 CBC is unremarkable except for a neutrophil count of 78%. [RC]   1429 The CMP is unremarkable [RC]   1429 PT T is 28 and normal.    The PT is 13.5 [RC]   1429  and INR 1.02.  These are normal.    The high-sensitivity troponin is 12 and normal [RC]   1432 The D-dimer is 0.53 and essentially normal [RC]   1500 Care excepted from Dr. Lopes.  Patient here and did have a stroke evaluation.  Patient's currently waiting on reads of CTs and repeat troponin. [AW]      ED Course User Index  [AW] Sebastian Aguirre MD  [RC] Pop Lopes MD                      HEART Score: 5                      Medical Decision Making  1430 NIHSS 0     Labs Reviewed  CBC WITH AUTO DIFFERENTIAL - Abnormal; Notable for the following components:     Neutrophil %                  78.4 (*)               Lymphocyte %      "             13.2 (*)            All other components within normal limits  D-DIMER, QUANTITATIVE - Abnormal; Notable for the following components:     D-Dimer, Quantitative         0.53 (*)            All other components within normal limits         Narrative: According to the assay 's published package insert, a normal (<0.50 MCGFEU/mL) D-dimer result in conjunction with a non-high clinical probability assessment, excludes deep vein thrombosis (DVT) and pulmonary embolism (PE) with high sensitivity.                                    D-dimer values increase with age and this can make VTE exclusion of an older population difficult. To address this, the [employer], based on best available evidence and recent guidelines, recommends that clinicians use age-adjusted D-dimer thresholds in patients greater than 50 years of age with: a) a low probability of PE who do not meet all Pulmonary Embolism Rule Out Criteria, or b) in those with intermediate probability of PE.                   The formula for an age-adjusted D-dimer cut-off is \"age/100\".                  For example, a 60 year old patient would have an age-adjusted cut-off of 0.60 MCGFEU/mL and an 80 year old 0.80 MCGFEU/mL.  PROTIME-INR - Normal         Narrative: Therapeutic Ranges for INR: 2.0-3.0 (PT 20-30)                                              2.5-3.5 (PT 25-34)  APTT - Normal         Narrative: PTT = The equivalent PTT values for the therapeutic range of heparin levels at 0.1 to 0.7 U/ml are 53 to 110 seconds.                    SINGLE HSTROPONIN T - Normal         Narrative: High Sensitive Troponin T Reference Range:                  <10.0 ng/L- Negative Female for AMI                  <15.0 ng/L- Negative Male for AMI                  >=10 - Abnormal Female indicating possible myocardial injury.                  >=15 - Abnormal Male indicating possible myocardial injury.                   Clinicians would have to " utilize clinical acumen, EKG, Troponin, and serial changes to determine if it is an Acute Myocardial Infarction or myocardial injury due to an underlying chronic condition.                                       BNP (IN-HOUSE) - Normal         Narrative: Among patients with dyspnea, NT-proBNP is highly sensitive for the detection of acute congestive heart failure. In addition NT-proBNP of <300 pg/ml effectively rules out acute congestive heart failure with 99% negative predictive value.                                    Results may be falsely decreased if patient taking Biotin.                    SINGLE HSTROPONIN T - Normal         Narrative: High Sensitive Troponin T Reference Range:                  <10.0 ng/L- Negative Female for AMI                  <15.0 ng/L- Negative Male for AMI                  >=10 - Abnormal Female indicating possible myocardial injury.                  >=15 - Abnormal Male indicating possible myocardial injury.                   Clinicians would have to utilize clinical acumen, EKG, Troponin, and serial changes to determine if it is an Acute Myocardial Infarction or myocardial injury due to an underlying chronic condition.                                       POCT GLUCOSE FINGERSTICK - Normal  RAINBOW DRAW         Narrative: The following orders were created for panel order Roland Draw.                  Procedure                               Abnormality         Status                                     ---------                               -----------         ------                                     Green Top (Gel)([id number])                                  Final result                               Lavender Top([id number])                                     Final result                               Gold Top - SST([id number])                                   Final result                               Light Blue Top([id number])                                   Final result                                                  Please view results for these tests on the individual orders.  COMPREHENSIVE METABOLIC PANEL         Narrative: GFR Normal >60                  Chronic Kidney Disease <60                  Kidney Failure <15                    POCT GLUCOSE FINGERSTICK  CBC AND DIFFERENTIAL         Narrative: The following orders were created for panel order CBC & Differential.                  Procedure                               Abnormality         Status                                     ---------                               -----------         ------                                     CBC Auto Differential([id number])        Abnormal            Final result                                                 Please view results for these tests on the individual orders.  GREEN TOP  LAVENDER TOP  GOLD TOP - SST  LIGHT BLUE TOP      CT Angiogram Neck    Result Date: 5/8/2023  No abnormality in carotid and vertebral vessels on contrast enhanced CT angiogram.  HISTORY: Tingling in the right upper extremity  TECHNIQUE: Axial imaging was obtained from the mid mediastinum to the skull base with 100 cc of Omnipaque 300 IV contrast. Evaluation for carotid stenosis was performed using NASCET criteria. Radiation dose reduction techniques were utilized, including automated exposure control and exposure modulation based on body size.  FINDINGS: Extravascular structures are remarkable for multilevel cervical degenerative disc disease. There is disc space narrowing and posterior osteophyte formation to a moderate degree at C3-4 with moderate spinal stenosis and mild bilateral foraminal narrowing. At the C3-4 level there is moderately severe facet arthropathy on the left. At C5-C6 degenerative disc disease is mild. Foraminal narrowing is moderate on the left and there is moderately severe left-sided facet arthropathy. No acute cervical spine bony abnormalities are noted. No evidence of severe central  spinal stenosis or right foraminal narrowing.  The great vessels are widely patent off of the arch. In the posterior circulation both vertebral arteries are widely patent with the right being slightly more dominant. In the carotid circulation there is minimal calcified plaque at the left bifurcation. There is no stenosis on either side. The cervical internal carotids are patent up to the skull base.  IMPRESSION: Minimal calcified plaque left carotid bifurcation without stenosis. The level cervical spondylosis from degenerative disc and facet disease as described above, left greater than right.  This report was finalized on 5/8/2023 3:32 PM by Dr. Ry Maki MD.      XR Chest 1 View    Result Date: 5/8/2023   1. Low volume image and bronchovascular crowding. No dense consolidation effusion or pneumothorax.  This report was finalized on 5/8/2023 2:46 PM by Dr. Justus Healy MD.      CT Head Without Contrast Stroke Protocol    Result Date: 5/8/2023  1. No clearly acute intracranial process. No evidence of acute intracranial hemorrhage. 2. Preliminary findings personally discussed with CT technologist, Theron, at 1422 hours per protocol.  This report was finalized on 5/8/2023 2:25 PM by Dr. Justus Healy MD.      CT Angiogram Head w AI Analysis of LVO    Result Date: 5/8/2023  No abnormality in intracranial vessels on CT angiogram.  This report was finalized on 5/8/2023 3:36 PM by Dr. Ry Maki MD.      CT CEREBRAL PERFUSION WITH & WITHOUT CONTRAST    Result Date: 5/8/2023  1. Equivocal tiny area of reversible ischemia adjacent to the frontal horn right lateral ventricle in the right frontal lobe although this could easily be artifactual. No core infarct identified.  This report was finalized on 5/8/2023 2:50 PM by Dr. Justus Healy MD.      1740 Pt seen again prior to d/c.  Labs/Imaging reviewed and are unremarkable.  Symptoms improved and pt feels better; vitals stable and pt. in NAD. Non-toxic. Comfortable.  Ambulating without difficulty.  Tolerating po.  Relaxed breathing.  Discussed with patient and patient's spouse who was present, if patient continued to have symptoms he will need to come back and we will need to do a bigger work-up.  I advised that he contact his PCP so they are aware of the ER evaluation.  Patient also says he generally sees his cardiologist about once a year and the last time he saw him he thought he was doing very good and was not concerned about anything at the time.  Patient's last heart cath was unremarkable and all of the stents and other vessels appeared to be very patent.  All questions personally answered at the bedside and all d/c instructions personally reviewed with pt.  Discussed the importance of close outpt. f/u and pt. understands this and agrees to do so.  Pt agrees to return to ED immediately for any new, persistent, or worsening symptoms.    EMR Dragon/Transcription disclaimer:  Much of this encounter note is an electronic transcription/translation of spoken language to printed text, aka voice recognition.  The electronic translation of spoken language may permit erroneous or at times nonsensical words or phrases to be inadvertently transcribed; although I have reviewed the note for such errors, some may still exist so please interpret based on surrounding text content.        Chest pain, unspecified type: acute illness or injury  Left hand paresthesia: acute illness or injury  Nonintractable headache, unspecified chronicity pattern, unspecified headache type: acute illness or injury  Amount and/or Complexity of Data Reviewed  Labs: ordered.  Radiology: ordered.      Risk  Prescription drug management.          Final diagnoses:   Nonintractable headache, unspecified chronicity pattern, unspecified headache type   Left hand paresthesia   Chest pain, unspecified type       ED Disposition  ED Disposition     ED Disposition   Discharge    Condition   Stable    Comment   --              Herminia Simmons, APRN  [address] [place] [address] [phone]    In 3 days           Medication List      Changed    clopidogrel 75 MG tablet  Commonly known as: PLAVIX  TAKE 1 TABLET DAILY  What changed: when to take this             Sebastian Aguirre MD  05/08/23 1747       Sebastian Aguirre MD  05/08/23 1741

## 2023-05-08 NOTE — ED NOTES
Patient ambulatory to restroom and back to bed with no assistance. Patient reports that he is feeling a little bit better

## 2023-09-18 ENCOUNTER — HOSPITAL ENCOUNTER (OUTPATIENT)
Dept: INFUSION THERAPY | Facility: HOSPITAL | Age: 48
Discharge: HOME | End: 2023-09-18
Admitting: PSYCHIATRY & NEUROLOGY
Payer: COMMERCIAL

## 2023-09-18 DIAGNOSIS — S86.892A MEDIAL TIBIAL STRESS SYNDROME, LEFT, INITIAL ENCOUNTER: ICD-10-CM

## 2023-09-18 DIAGNOSIS — R20.2 PARESTHESIA OF LEFT LOWER EXTREMITY: ICD-10-CM

## 2023-09-18 PROCEDURE — 95909 NRV CNDJ TST 5-6 STUDIES: CPT

## 2023-09-18 PROCEDURE — 95886 MUSC TEST DONE W/N TEST COMP: CPT

## 2023-09-18 NOTE — PROCEDURES
EMG and Nerve Conduction Studies    I.      Instrument used: Neuromax 1002  II.     Please see data sheets for tabular summary of NCS and details on methods, temperatures and lab standards.   III.    EMG muscles tested for upper extremity studies include the deltoid, biceps, triceps, pronator teres, extensor digitorum communis, first dorsal interosseous and abductor pollicis brevis.    IV.   EMG muscles tested for lower extremity studies include the vastus lateralis, tibialis anterior, peroneus longus, medial gastrocnemius and extensor digitorum brevis.    V.    Additional muscles tested as needed.  Paraspinal muscles tested as needed.   VI.   Please see data sheets for tabular summary of EMG findings.   VII. The complete report includes the data sheets.      Indication: Pain left lower leg  History: 48-year-old male with 10-month history of pain in the left lower leg mostly in the anterolateral shin area over the tibialis anterior.  This runs from nearly the knee to the ankle but mostly around its midpoint.  Nothing seems to exacerbate the symptoms.  He has some numbness and tingling in the left foot typically after sitting for a while.  No history of diabetes or thyroid disease.  Some low back pain but it does not seem to be tied to the pain in the shin      Ht: 177.8 cm  Wt: 130 kg  HbA1C:   Lab Results   Component Value Date    HGBA1C 4.90 03/03/2023     TSH:   Lab Results   Component Value Date    TSH 3.060 04/21/2022       Technical summary:  Nerve conduction studies were obtained in the left leg.  Skin temperatures initially were quite cold and so the foot was warmed prior to study.  Temperatures were generally at least 32 °C and temperature correction was not needed.  Needle examination was obtained on selected muscles of the left leg.    Results:  1.  Normal left sural sensory distal latency and amplitude.  2.  Normal left superficial peroneal sensory distal latency and amplitude.  3.  Normal left saphenous  sensory distal latency and amplitude.  4.  Normal left peroneal motor conduction velocities with a normal distal latency and amplitudes.  5.  Normal left tibial motor conduction velocity with a normal distal latency and amplitudes.  6.  Needle examination of selected muscles in the left leg showed normal insertional activities without fibrillations or positive sharp waves.  There were normal motor units and recruitment patterns throughout.    Impression:  Normal study.  No evidence of a left peroneal, tibial or saphenous neuropathy and no evidence of a left lumbosacral radiculopathy by this study.  Study results were discussed with the patient.    Boone Ortega M.D.              Dictated utilizing Dragon dictation.

## 2023-11-01 ENCOUNTER — OFFICE VISIT (OUTPATIENT)
Dept: ORTHOPEDIC SURGERY | Facility: CLINIC | Age: 48
End: 2023-11-01
Payer: COMMERCIAL

## 2023-11-01 VITALS — WEIGHT: 287 LBS | BODY MASS INDEX: 41.09 KG/M2 | HEIGHT: 70 IN

## 2023-11-01 DIAGNOSIS — M51.16 LUMBAR DISC DISEASE WITH RADICULOPATHY: Primary | ICD-10-CM

## 2023-11-01 DIAGNOSIS — R20.2 PARESTHESIA OF LEFT LOWER EXTREMITY: ICD-10-CM

## 2023-11-01 PROCEDURE — 99214 OFFICE O/P EST MOD 30 MIN: CPT | Performed by: NURSE PRACTITIONER

## 2023-11-01 NOTE — PROGRESS NOTES
Subjective:     Patient ID: Blake Dennis is a 48 y.o. male.    Chief Complaint:  Follow-up paresthesia left lower extremity  History of Present Illness  Blake DennisPresents today for reevaluation of his left lower extremity he has continued core strengthening exercises as previously provided by his primary care as well as chiropractic care.  He has had prior x-ray images lumbar spine outside facility does not have them available for review.  He has sought chiropractic care with adjustments which do provide mild symptom improvement on a short-term basis.  He is experiencing pain across his lumbar spine.  He has completed EMG and CT of the left lower extremity who returns to discuss some further plan of care.  He continues to experience paresthesia along the anterior tib-fib proximal to distal with associated paresthesia into the plantar aspect of the foot and all digits left lower extremity only.  Worsening of paresthesia with the left lower extremity elevated at night such as when he is resting in a chair he does require frequent repositioning.  Has had cardiac stents therefore taking ibuprofen only when needed has continued Tylenol, heat and strengthening exercises lower extremity as previously provided.  Denies any other concerns present.    Social History     Occupational History    Not on file   Tobacco Use    Smoking status: Never    Smokeless tobacco: Never    Tobacco comments:     caffeine use 1 cup coffee daily   Vaping Use    Vaping Use: Never used   Substance and Sexual Activity    Alcohol use: Not Currently     Alcohol/week: 6.0 standard drinks of alcohol     Types: 6 Shots of liquor per week     Comment: very rarely    Drug use: Never    Sexual activity: Defer      Past Medical History:   Diagnosis Date    Antiplatelet or antithrombotic long-term use     Plavix    BMI 38.0-38.9,adult     CAD (coronary artery disease) 2020    h/o stents x2, Dr Torres follows    Chest pain     Elevated  cholesterol     H/O heart artery stent     x2 2020 LAD    Hypertension     Sleep apnea     CPAP     Past Surgical History:   Procedure Laterality Date    CARDIAC CATHETERIZATION N/A 4/27/2020    Procedure: Coronary angiography;  Surgeon: Mj Anne MD;  Location:  [facility] CATH INVASIVE LOCATION;  Service: Cardiovascular;  Laterality: N/A;    CARDIAC CATHETERIZATION N/A 4/27/2020    Procedure: Left heart cath;  Surgeon: Mj Anne MD;  [facility];  Service: Cardiovascular;  Laterality: N/A;    CARDIAC CATHETERIZATION N/A 4/27/2020    Procedure: Left ventriculography;  Surgeon: Mj Anne MD;  Location:  [facility] CATH INVASIVE LOCATION;  Service: Cardiovascular;  Laterality: N/A;    CARDIAC CATHETERIZATION N/A 4/27/2020    Procedure: Stent TANNER coronary;  Surgeon: Mj Anne MD;  [facility];  Service: Cardiovascular;  Laterality: N/A;    CARDIAC CATHETERIZATION N/A 4/21/2022    Procedure: Left Heart Cath;  Surgeon: Benjamin Hui MD;  [facility];  Service: Cardiovascular;  Laterality: N/A;    CARDIAC CATHETERIZATION N/A 4/21/2022    Procedure: Coronary angiography;  Surgeon: Benjamin Hui MD;  [facility];  Service: Cardiovascular;  Laterality: N/A;    CARDIAC CATHETERIZATION N/A 4/21/2022    Procedure: Left ventriculography;  Surgeon: Benjamin Hui MD;  Location: [facility] CATH INVASIVE LOC[facility];  Service: Cardiovascular;  Laterality: N/A;    INGUINAL HERNIA REPAIR Bilateral 3/9/2023    Procedure: umbilical hernia repair with laparoscopic bilateral inguinal hernia repair;  Surgeon: Rosalie Miller DO;  Location: [facility] OR;  Service: General;  Laterality: Bilateral;    UMBILICAL HERNIA REPAIR N/A 3/9/2023    Procedure: UMBILICAL HERNIA REPAIR;  Surgeon: Rosalie Miller DO;  Location:  [facility] OR;  Service: General;  Laterality: N/A;       Family History   Problem  "Relation Age of Onset    Hypertension Mother     Cancer Mother     Malig Hyperthermia Neg Hx                Objective:  Physical Exam    General: No acute distress.  Eyes: conjunctiva clear; pupils equally round and reactive  ENT: external ears and nose atraumatic; oropharynx clear  CV: no peripheral edema  Resp: normal respiratory effort  Skin: no rashes or wounds; normal turgor  Psych: mood and affect appropriate; recent and remote memory intact    Vitals:    11/01/23 0815   Weight: 130 kg (287 lb)   Height: 177.8 cm (70\")         11/01/23  0815   Weight: 130 kg (287 lb)     Body mass index is 41.18 kg/m².      Left Knee Exam     Comments:  Decreased sensation along the proximal tib-fib anteriorly radiating to distal, paresthesia plantar aspect of the foot including all digits  2+ dorsalis pedis pulse      Back Exam     Tenderness   The patient is experiencing tenderness in the lumbar.    Tests   Straight leg raise left: positive at 60 deg             Imaging:  Lumbar Spine X-Ray  Indication: Pain  AP and Lateral views    Findings:  No fracture  No bony lesion  Normal soft tissues  Lumbar degeneration L4-L5 L5-S1 with anterior osteophytes appreciated, facet arthropathy, T12-L1 narrowing  No prior studies were available for comparison.     Reviewed report EMG and CT essentially normal study no evidence of left peroneal, tibial or saphenous neuropathy    Assessment:        1. Lumbar disc disease with radiculopathy    2. Paresthesia of left lower extremity           Plan:  1.  Discussed plan of care with patient.  We did review x-ray imaging at today's visit plan to proceed with MRI to evaluate nerve compression.  And reviewed prior MR tib-fib left lower extremity completed 3/17/2023 evidence of fatty replacement of superficial portion of peroneal longus muscle. I have encouraged him to continue with core strengthening exercises hip strengthening exercises.  Continue ibuprofen as needed we will plan to see him back " in clinic after completion of testing to discuss results and further plan of care.  He verbalized worsening of [place] agrees with plan of care.  Denies any other concerns present.  Orders:  Orders Placed This Encounter   Procedures    XR Spine Lumbar 2 or 3 View    MRI Lumbar Spine Without Contrast     No orders of the defined types were placed in this encounter.          I ordered and reviewed the LACY today.       Dragon dictation utilized  We encourage all our patients to maintain a healthy weight - a Body Mass Index of 20-25. This, along with regular exercise and a balanced diet can lower your risk of many illnesses such as diabetes, heart disease, and stroke.

## 2023-12-05 ENCOUNTER — HOSPITAL ENCOUNTER (OUTPATIENT)
Dept: MRI IMAGING | Facility: HOSPITAL | Age: 48
Discharge: HOME | End: 2023-12-05
Admitting: NURSE PRACTITIONER
Payer: COMMERCIAL

## 2023-12-05 DIAGNOSIS — R20.2 PARESTHESIA OF LEFT LOWER EXTREMITY: ICD-10-CM

## 2023-12-05 DIAGNOSIS — M51.16 LUMBAR DISC DISEASE WITH RADICULOPATHY: ICD-10-CM

## 2023-12-05 PROCEDURE — 72148 MRI LUMBAR SPINE W/O DYE: CPT

## 2023-12-11 ENCOUNTER — OFFICE VISIT (OUTPATIENT)
Dept: ORTHOPEDIC SURGERY | Facility: CLINIC | Age: 48
End: 2023-12-11
Payer: COMMERCIAL

## 2023-12-11 VITALS — WEIGHT: 297.8 LBS | BODY MASS INDEX: 42.63 KG/M2 | HEIGHT: 70 IN

## 2023-12-11 DIAGNOSIS — M51.16 LUMBAR DISC DISEASE WITH RADICULOPATHY: Primary | ICD-10-CM

## 2023-12-11 DIAGNOSIS — R20.2 PARESTHESIA OF LEFT LOWER EXTREMITY: ICD-10-CM

## 2023-12-11 PROCEDURE — 99214 OFFICE O/P EST MOD 30 MIN: CPT | Performed by: NURSE PRACTITIONER

## 2023-12-11 NOTE — PROGRESS NOTES
Subjective:     Patient ID: Blake Dennis is a 48 y.o. male.    Chief Complaint:  Follow-up paresthesia left lower extremity   History of Present Illness  Blkae Dennis    The patient returns to clinic after completion of his MRI of his lumbar spine to discuss the results and further plan of care. He continues to experience paresthesia radiating down the lateral aspect of the left lower extremity. He has completed a CT test in the past and referred for MRI of the lumbar spine. He continues to experience pain daily. He has continued the core strengthening and stretching exercises previously provided. He continues to experience paresthesia along the anterior lateral tib-fib with numbness and tingling radiating into the plantar aspect of the foot in all digits. It is worse with the extremity elevated at night, such as when resting in a chair and does require frequent repositioning. Due to his prior cardiac history, he is taking ibuprofen only when needed and has to continue Tylenol, heat as instructed. He denies any other concerns present.  Social History     Occupational History    Not on file   Tobacco Use    Smoking status: Never    Smokeless tobacco: Never    Tobacco comments:     caffeine use 1 cup coffee daily   Vaping Use    Vaping Use: Never used   Substance and Sexual Activity    Alcohol use: Not Currently     Alcohol/week: 6.0 standard drinks of alcohol     Types: 6 Shots of liquor per week     Comment: very rarely    Drug use: Never    Sexual activity: Defer      Past Medical History:   Diagnosis Date    Antiplatelet or antithrombotic long-term use     Plavix    BMI 38.0-38.9,adult     CAD (coronary artery disease) 2020    h/o stents x2, Dr Torres follows    Chest pain     Elevated cholesterol     H/O heart artery stent     x2 2020 LAD    Hypertension     Sleep apnea     CPAP     Past Surgical History:   Procedure Laterality Date    CARDIAC CATHETERIZATION N/A 4/27/2020    Procedure: Coronary  angiography;  Surgeon: Mj Anne MD;  [facility];  Service: Cardiovascular;  Laterality: N/A;    CARDIAC CATHETERIZATION N/A 4/27/2020    Procedure: Left heart cath;  Surgeon: Mj Anne MD;  [facility];  Service: Cardiovascular;  Laterality: N/A;    CARDIAC CATHETERIZATION N/A 4/27/2020    Procedure: Left ventriculography;  Surgeon: Mj Anne MD;  [facility];  Service: Cardiovascular;  Laterality: N/A;    CARDIAC CATHETERIZATION N/A 4/27/2020    Procedure: Stent TANNER coronary;  Surgeon: Mj Anne MD;  [facility];  Service: Cardiovascular;  Laterality: N/A;    CARDIAC CATHETERIZATION N/A 4/21/2022    Procedure: Left Heart Cath;  Surgeon: Benjamin Hui MD;  Location: [facility] CATH INVASIVE LOCATION;  Service: Cardiovascular;  Laterality: N/A;    CARDIAC CATHETERIZATION N/A 4/21/2022    Procedure: Coronary angiography;  Surgeon: Benjamin Hui MD;  Location: [facility] CATH INVASIVE LOCATION;  Service: Cardiovascular;  Laterality: N/A;    CARDIAC CATHETERIZATION N/A 4/21/2022    Procedure: Left ventriculography;  Surgeon: Benjamin Hui MD;  Location: [facility] CATH INVASIVE LOCATION;  Service: Cardiovascular;  Laterality: N/A;    INGUINAL HERNIA REPAIR Bilateral 3/9/2023    Procedure: umbilical hernia repair with laparoscopic bilateral inguinal hernia repair;  Surgeon: Rosalie Miller DO;  Location: [facility] OR;  Service: General;  Laterality: Bilateral;    UMBILICAL HERNIA REPAIR N/A 3/9/2023    Procedure: UMBILICAL HERNIA REPAIR;  Surgeon: Rosalie Miller DO;  Location:  [facility] OR;  Service: General;  Laterality: N/A;       Family History   Problem Relation Age of Onset    Hypertension Mother     Cancer Mother     Malig Hyperthermia Neg Hx                Objective:  Physical Exam    General: No acute distress.  Eyes: conjunctiva clear; pupils equally round and  "reactive  ENT: external ears and nose atraumatic; oropharynx clear  CV: no peripheral edema  Resp: normal respiratory effort  Skin: no rashes or wounds; normal turgor  Psych: mood and affect appropriate; recent and remote memory intact    Vitals:    12/11/23 0812   Weight: 135 kg (297 lb 12.8 oz)   Height: 177.8 cm (70\")         12/11/23 0812   Weight: 135 kg (297 lb 12.8 oz)     Body mass index is 42.73 kg/m².      Back Exam     Tenderness   The patient is experiencing tenderness in the lumbar.    Tests   Straight leg raise left: positive at 60 deg               Imaging:    MRI Lumbar Spine Without Contrast    Result Date: 12/5/2023  Degenerative disc and facet disease throughout the lumbar spine as described above level by level. There is more prominent disc bulging and osteophyte formation at L5-S1 which contacts the right S1 nerve root. No evidence of left sided disc herniation. Foraminal narrowing on the symptomatic left side is most severe at L5-S1.  This report was finalized on 12/5/2023 9:43 AM by Dr. Ry Maki MD.         Assessment:        1. Lumbar disc disease with radiculopathy    2. Paresthesia of left lower extremity           Plan:   discussed the plan of care with the patient. We discussed proceeding with referral epidural injections to the lumbar spine at [facility]. He will be contacted by their office for an appointment. He will follow-up in the clinic as needed. In the future, if he wishes to proceed again with injections, we can place an order and does not need an appointment, or he can contact ambulatory pain management. He is encouraged to call with any questions or concerns. All questions were answered.    Orders  Orders Placed This Encounter   Procedures    Ambulatory Referral to Hospital Pain Management Department     No orders of the defined types were placed in this encounter.      Dragon dictation utilized    Transcribed from ambient dictation for NIKI Gorman by Freda" Trev.  12/11/23   13:14 EST    Patient or patient representative verbalized consent to the visit recording.  I have personally performed the services described in this document as transcribed by the above individual, and it is both accurate and complete.

## 2023-12-21 ENCOUNTER — ANESTHESIA EVENT (OUTPATIENT)
Dept: PAIN MEDICINE | Facility: HOSPITAL | Age: 48
End: 2023-12-21
Payer: COMMERCIAL

## 2023-12-22 ENCOUNTER — ANESTHESIA (OUTPATIENT)
Dept: PAIN MEDICINE | Facility: HOSPITAL | Age: 48
End: 2023-12-22
Payer: COMMERCIAL

## 2023-12-22 ENCOUNTER — HOSPITAL ENCOUNTER (OUTPATIENT)
Dept: GENERAL RADIOLOGY | Facility: HOSPITAL | Age: 48
Discharge: HOME | End: 2023-12-22
Payer: COMMERCIAL

## 2023-12-22 ENCOUNTER — HOSPITAL ENCOUNTER (OUTPATIENT)
Dept: PAIN MEDICINE | Facility: HOSPITAL | Age: 48
Discharge: HOME | End: 2023-12-22
Payer: COMMERCIAL

## 2023-12-22 VITALS
HEIGHT: 71 IN | DIASTOLIC BLOOD PRESSURE: 70 MMHG | SYSTOLIC BLOOD PRESSURE: 119 MMHG | HEART RATE: 48 BPM | BODY MASS INDEX: 39.9 KG/M2 | RESPIRATION RATE: 17 BRPM | TEMPERATURE: 98 F | OXYGEN SATURATION: 96 % | WEIGHT: 285 LBS

## 2023-12-22 DIAGNOSIS — R52 PAIN: ICD-10-CM

## 2023-12-22 DIAGNOSIS — M51.16 INTERVERTEBRAL DISC DISORDER WITH RADICULOPATHY OF LUMBAR REGION: Primary | ICD-10-CM

## 2023-12-22 PROCEDURE — 77003 FLUOROGUIDE FOR SPINE INJECT: CPT

## 2023-12-22 PROCEDURE — 25010000002 METHYLPREDNISOLONE PER 80 MG: Performed by: ANESTHESIOLOGY

## 2023-12-22 RX ORDER — FENTANYL CITRATE 50 UG/ML
50 INJECTION, SOLUTION INTRAMUSCULAR; INTRAVENOUS AS NEEDED
Status: DISCONTINUED | OUTPATIENT
Start: 2023-12-22 | End: 2023-12-23 | Stop reason: HOSPADM

## 2023-12-22 RX ORDER — MIDAZOLAM HYDROCHLORIDE 1 MG/ML
1 INJECTION INTRAMUSCULAR; INTRAVENOUS
Status: DISCONTINUED | OUTPATIENT
Start: 2023-12-22 | End: 2023-12-23 | Stop reason: HOSPADM

## 2023-12-22 RX ORDER — METHYLPREDNISOLONE ACETATE 80 MG/ML
80 INJECTION, SUSPENSION INTRA-ARTICULAR; INTRALESIONAL; INTRAMUSCULAR; SOFT TISSUE ONCE
Status: COMPLETED | OUTPATIENT
Start: 2023-12-22 | End: 2023-12-22

## 2023-12-22 RX ORDER — LIDOCAINE HYDROCHLORIDE 10 MG/ML
1 INJECTION, SOLUTION INFILTRATION; PERINEURAL ONCE
Status: DISCONTINUED | OUTPATIENT
Start: 2023-12-22 | End: 2023-12-23 | Stop reason: HOSPADM

## 2023-12-22 RX ORDER — IOPAMIDOL 408 MG/ML
3 INJECTION, SOLUTION INTRATHECAL
Status: DISCONTINUED | OUTPATIENT
Start: 2023-12-22 | End: 2023-12-23 | Stop reason: HOSPADM

## 2023-12-22 RX ADMIN — METHYLPREDNISOLONE ACETATE 80 MG: 80 INJECTION, SUSPENSION INTRA-ARTICULAR; INTRALESIONAL; INTRAMUSCULAR; SOFT TISSUE at 10:05

## 2023-12-22 NOTE — H&P
[facility]    History and Physical    Patient Name: Blake Dennis  :  1975  MRN:  [id number]  Date of Admission: 2023    Subjective     Patient is a 48 y.o. male presents with chief complaint of chronic, intermittent, moderate, severe, 8/10, unknown etiology, burning,   low back and left leg  pain.  Onset of symptoms was gradual starting 1 year ago.  Symptoms are associated/aggravated by activity. Symptoms improve with nothing    The following portions of the patients history were reviewed and updated as appropriate: current medications, allergies, past medical history, past surgical history, past family history, past social history, and problem list                Objective     Past Medical History:   Past Medical History:   Diagnosis Date    Antiplatelet or antithrombotic long-term use     Plavix    BMI 38.0-38.9,adult     CAD (coronary artery disease) 2020    h/o stents x2, Dr Torres follows    Chest pain     Elevated cholesterol     H/O heart artery stent     x2  LAD    Hypertension     Sleep apnea     CPAP     Past Surgical History:   Past Surgical History:   Procedure Laterality Date    CARDIAC CATHETERIZATION N/A 2020    Procedure: Coronary angiography;  Surgeon: Mj Anne MD;  Location: [facility] INVASIVE LOCATION;  Service: Cardiovascular;  Laterality: N/A;    CARDIAC CATHETERIZATION N/A 2020    Procedure: Left heart cath;  Surgeon: Mj Anne MD;  Location: [facility] INVASIVE LOCATION;  Service: Cardiovascular;  Laterality: N/A;    CARDIAC CATHETERIZATION N/A 2020    Procedure: Left ventriculography;  Surgeon: Mj Anne MD;  Location: [facility] CATH INVASIVE LOCATION;  Service: Cardiovascular;  Laterality: N/A;    CARDIAC CATHETERIZATION N/A 2020    Procedure: Stent TANNER coronary;  Surgeon: Mj Anne MD;  Location: [facility] CATH INVASIVE LOCATION;  Service: Cardiovascular;  Laterality: N/A;    CARDIAC CATHETERIZATION N/A  4/21/2022    Procedure: Left Heart Cath;  Surgeon: Benjamin Hui MD;  Location:  [facility] CATH INVASIVE LOCATION;  Service: Cardiovascular;  Laterality: N/A;    CARDIAC CATHETERIZATION N/A 4/21/2022    Procedure: Coronary angiography;  Surgeon: Benjamin Hui MD;  Location:  [facility] CATH INVASIVE LOCATION;  Service: Cardiovascular;  Laterality: N/A;    CARDIAC CATHETERIZATION N/A 4/21/2022    Procedure: Left ventriculography;  Surgeon: Benjamin Hui MD;  Location:  [facility] CATH INVASIVE LOCATION;  Service: Cardiovascular;  Laterality: N/A;    INGUINAL HERNIA REPAIR Bilateral 3/9/2023    Procedure: umbilical hernia repair with laparoscopic bilateral inguinal hernia repair;  Surgeon: Rosalie Miller DO;  Location:  [facility] OR;  Service: General;  Laterality: Bilateral;    UMBILICAL HERNIA REPAIR N/A 3/9/2023    Procedure: UMBILICAL HERNIA REPAIR;  Surgeon: Rosalie Miller DO;  Location:  [facility] OR;  Service: General;  Laterality: N/A;     Family History:   Family History   Problem Relation Age of Onset    Hypertension Mother     Cancer Mother     Malig Hyperthermia Neg Hx      Social History:   Social History     Socioeconomic History    Marital status:      Spouse name: Kaylene Dennis    Number of children: 2    Years of education: 12    Highest education level: High school graduate   Tobacco Use    Smoking status: Never    Smokeless tobacco: Never    Tobacco comments:     caffeine use 1 cup coffee daily   Vaping Use    Vaping Use: Never used   Substance and Sexual Activity    Alcohol use: Not Currently     Alcohol/week: 6.0 standard drinks of alcohol     Types: 6 Shots of liquor per week     Comment: very rarely    Drug use: Never    Sexual activity: Defer       Vital Signs Range for the last 24 hours  Temperature:     Temp Source:     BP:     Pulse:     Respirations:     SPO2:     O2 Amount (l/min):     O2 Devices     Weight:            --------------------------------------------------------------------------------    Current Outpatient Medications   Medication Sig Dispense Refill    clopidogrel (PLAVIX) 75 MG tablet TAKE 1 TABLET DAILY (Patient taking differently: Take 1 tablet by mouth.) 90 tablet 3    Omega-3 Fatty Acids (fish oil) 1000 MG capsule capsule Take 1 capsule by mouth 2 (Two) Times a Day.      ALBUTEROL IN Inhale 2 puffs As Needed (SOA/wheezing).      atorvastatin (LIPITOR) 20 MG tablet Take 1 tablet by mouth Every Night.      cholecalciferol (VITAMIN D3) 25 MCG (1000 UT) tablet Take 4 tablets by mouth Daily.      Cyanocobalamin (Vitamin B-12) 5000 MCG lozenge Take 1 tablet by mouth See Admin Instructions. Every 2 days      metoprolol tartrate (LOPRESSOR) 25 MG tablet TAKE 1 TABLET TWICE A  tablet 3    nitroglycerin (NITROSTAT) 0.4 MG SL tablet Place 1 tablet under the tongue Every 5 (Five) Minutes As Needed for Chest Pain. Place one tablet under tongue as needed for chest pain. (Patient not taking: Reported on 12/11/2023) 25 tablet 3     Current Facility-Administered Medications   Medication Dose Route Frequency Provider Last Rate Last Admin    fentaNYL citrate (PF) (SUBLIMAZE) injection 50 mcg  50 mcg Intravenous PRN Rufus Murphy MD        iopamidol (ISOVUE-M 200) injection 41%  3 mL Epidural Once in imaging Rufus Murphy MD        lidocaine (XYLOCAINE) 1 % injection 1 mL  1 mL Intradermal Once Rufus Murphy MD        methylPREDNISolone acetate (DEPO-medrol) injection 80 mg  80 mg Epidural Once Rufus Murphy MD        midazolam (VERSED) injection 1 mg  1 mg Intravenous Q5 Min PRN Rufus Murphy MD           --------------------------------------------------------------------------------  Assessment & Plan      Anesthesia Evaluation             Modalities previously tried to control pain with limited effectiveness within the last 4-6 weeks: Other     Airway   Mallampati: II  Dental      Pulmonary     Cardiovascular     (+) hypertension, CAD      Neuro/Psych  GI/Hepatic/Renal/Endo    (+) morbid obesity    Musculoskeletal     Abdominal    Substance History      OB/GYN          Other                     Diagnosis and Plan    Treatment Plan  ASA 3      Procedures: Lumbar Epidural Steroid Injection(LESI), With fluoroscopy,      Anesthetic plan and risks discussed with patient.          Diagnosis     * Intervertebral disc disorders with radiculopathy, lumbar region [M51.16]          CHIEF COMPLAINT:       HISTORY OF PRESENT ILLNESS:      PAST MEDICAL HISTORY:  Current Outpatient Medications on File Prior to Encounter   Medication Sig Dispense Refill    clopidogrel (PLAVIX) 75 MG tablet TAKE 1 TABLET DAILY (Patient taking differently: Take 1 tablet by mouth.) 90 tablet 3    Omega-3 Fatty Acids (fish oil) 1000 MG capsule capsule Take 1 capsule by mouth 2 (Two) Times a Day.      ALBUTEROL IN Inhale 2 puffs As Needed (SOA/wheezing).      atorvastatin (LIPITOR) 20 MG tablet Take 1 tablet by mouth Every Night.      cholecalciferol (VITAMIN D3) 25 MCG (1000 UT) tablet Take 4 tablets by mouth Daily.      Cyanocobalamin (Vitamin B-12) 5000 MCG lozenge Take 1 tablet by mouth See Admin Instructions. Every 2 days      metoprolol tartrate (LOPRESSOR) 25 MG tablet TAKE 1 TABLET TWICE A  tablet 3    nitroglycerin (NITROSTAT) 0.4 MG SL tablet Place 1 tablet under the tongue Every 5 (Five) Minutes As Needed for Chest Pain. Place one tablet under tongue as needed for chest pain. (Patient not taking: Reported on 12/11/2023) 25 tablet 3     No current facility-administered medications on file prior to encounter.       Past Medical History:   Diagnosis Date    Antiplatelet or antithrombotic long-term use     Plavix    BMI 38.0-38.9,adult     CAD (coronary artery disease) 2020    h/o stents x2, Dr Torrse follows    Chest pain     Elevated cholesterol     H/O heart artery stent     x2 2020 LAD    Hypertension     Sleep apnea      CPAP         SOCIAL HISTORY:  No tobacco    REVIEW OF SYSTEMS:  No hematologic infectious or constitutional symptoms  Other review of systems non-contributory    PHYSICAL EXAM:  There were no vitals taken for this visit.  Well-developed well-nourished no acute distress  Mallampati class 2 airway  Cardiac:  Regular rate and rhythm  Lungs:  Clear to auscultation bilaterally   Alert and oriented ×3  Negative straight leg raise bilaterally  5 out of 5 strength bilateral lower extremities        DIAGNOSIS:  Post-Op Diagnosis Codes:     * Intervertebral disc disorders with radiculopathy, lumbar region [M51.16]    PLAN:  1.  Lumbar epidural steroid injections, up to 4.  If pain control is acceptable after 1 or 2 injections, it was discussed with the patient that they may return for the subsequent injections if and when their pain returns.  The risks were discussed with the patient including failure of relief, worsening pain, Headache (post dural puncture headache), bleeding (epidural hematoma) and infection (epidural abscess or skin infection).  2.  Physical therapy exercises at home as prescribed by physical therapy or from the pain clinic handout (given to the patient).  Continuation of these exercises every day, or multiple times per week, even when the patient has good pain relief, was stressed to the patient as a preventative measure to decrease the frequency and severity of future pain episodes.  3.  Continue pain medicines as already prescribed.  If patient not currently taking any, it is recommended to begin Acetaminophen 1000 mg po q 8 hours.  If other medicines containing Acetaminophen are currently prescribed, maintain daily dose at 3000 mg.    4.  If they can tolerate NSAIDS, it is recommended to take Ibuprofen 600 mg po q 6 hours for 7 days during pain exacerbations.  Alternatively, they may substitute an NSAID of their choice (e.g. Aleve).  This may be taken at the same time as Acetaminophen.  5.  Heat and  ice to the affected area as tolerated for pain control.  It was discussed that heating pads can cause burns.  6.  Daily low impact exercise such as walking or water exercise was recommended to maintain overall health and aid in weight control.   7.  Follow up as needed for subsequent injections.  8.  Patient was counseled to abstain from tobacco products.    Target : L 4-5    Time :  21    min

## 2023-12-22 NOTE — ANESTHESIA PROCEDURE NOTES
PAIN Epidural block    Pre-sedation assessment completed: 12/22/2023 10:03 AM    Patient reassessed immediately prior to procedure    Patient location during procedure: pain clinic  Start Time: 12/22/2023 10:03 AM  Stop Time: 12/22/2023 10:11 AM  Indication:at surgeon's request and procedure for pain  Performed By  Anesthesiologist: Rufus Murphy MD  Preanesthetic Checklist  Completed: patient identified, IV checked, site marked, risks and benefits discussed, surgical consent, monitors and equipment checked, pre-op evaluation and timeout performed  Additional Notes  Dx:  Post-Op Diagnosis Codes:     * Intervertebral disc disorders with radiculopathy, lumbar region [M51.16]  80 mg depomedrol in epid    Plan : return to clinic as needed  Prep:  Pt Position:prone (prone)  Sterile Tech:cap, gloves, mask and sterile barrier  Prep:chlorhexidine gluconate and isopropyl alcohol  Monitoring:blood pressure monitoring, EKG and continuous pulse oximetry  Procedure:Sedation: no     Approach:midline  Guidance: fluoroscopy and c arm pa and lat and loss of resistance  Location:lumbar  Level:4-5 (interlaminar)  Needle Type:Elisabeth  Needle Gauge:20  Aspiration:negative  Medications:  Preservative Free Saline:3mL  Depomedrol:80 mg  Post Assessment:  Pt Tolerance:patient tolerated the procedure well with no apparent complications  Complications:no

## 2023-12-22 NOTE — DISCHARGE INSTRUCTIONS

## 2024-02-19 RX ORDER — CLOPIDOGREL BISULFATE 75 MG/1
75 TABLET ORAL DAILY
Qty: 90 TABLET | Refills: 3 | Status: SHIPPED | OUTPATIENT
Start: 2024-02-19

## 2024-03-12 ENCOUNTER — OFFICE VISIT (OUTPATIENT)
Dept: CARDIOLOGY | Facility: CLINIC | Age: 49
End: 2024-03-12
Payer: COMMERCIAL

## 2024-03-12 VITALS
BODY MASS INDEX: 40.88 KG/M2 | WEIGHT: 292 LBS | SYSTOLIC BLOOD PRESSURE: 130 MMHG | HEIGHT: 71 IN | HEART RATE: 55 BPM | DIASTOLIC BLOOD PRESSURE: 84 MMHG | OXYGEN SATURATION: 96 %

## 2024-03-12 DIAGNOSIS — I25.10 CORONARY ARTERY DISEASE INVOLVING NATIVE CORONARY ARTERY OF NATIVE HEART WITHOUT ANGINA PECTORIS: Primary | ICD-10-CM

## 2024-03-12 PROCEDURE — 99214 OFFICE O/P EST MOD 30 MIN: CPT | Performed by: INTERNAL MEDICINE

## 2024-03-12 RX ORDER — METOPROLOL SUCCINATE 25 MG/1
25 TABLET, EXTENDED RELEASE ORAL DAILY
Qty: 90 TABLET | Refills: 3 | Status: SHIPPED | OUTPATIENT
Start: 2024-03-12

## 2024-03-12 NOTE — PROGRESS NOTES
"      CARDIOLOGY    Bo Torres MD    ENCOUNTER DATE:  03/12/2024    Blake Dennis / 49 y.o. / male        CHIEF COMPLAINT / REASON FOR OFFICE VISIT     Coronary Artery Disease (12/07/2022 Follow up)      HISTORY OF PRESENT ILLNESS       Coronary Artery Disease      Blake Dennis is a 49 y.o. male who presents today for       The following portions of the patient's history were reviewed and updated as appropriate: allergies, current medications, past family history, past medical history, past social history, past surgical history and problem list.      VITAL SIGNS     Visit Vitals  /84 (BP Location: Left arm)   Pulse 55   Ht 180.3 cm (71\")   Wt 132 kg (292 lb)   SpO2 96%   BMI 40.73 kg/m²         Wt Readings from Last 3 Encounters:   03/12/24 132 kg (292 lb)   12/22/23 129 kg (285 lb)   12/11/23 135 kg (297 lb 12.8 oz)     Body mass index is 40.73 kg/m².      REVIEW OF SYSTEMS   ROS        PHYSICAL EXAMINATION     Vitals reviewed.   Constitutional:       Appearance: Healthy appearance.   Pulmonary:      Effort: Pulmonary effort is normal.   Cardiovascular:      Normal rate. Regular rhythm. Normal S1. Normal S2.       Murmurs: There is no murmur.      No gallop.  No click. No rub.   Pulses:     Intact distal pulses.   Edema:     Peripheral edema absent.   Neurological:      Mental Status: Alert and oriented to person, place and time.           REVIEWED DATA     Procedures    Cardiac Procedures:            ASSESSMENT & PLAN      Diagnosis Plan   1. Coronary artery disease involving native coronary artery of native heart without angina pectoris              SUMMARY/DISCUSSION  Coronary artery disease.  Clinically stable doing well.  Patient was taken off his aspirin because he was having a lot of GI upset.  His heart rates been slow and he is also been gaining weight for unknown reasons.  He exercises frequently he lifts weights several times a week and he does cardio for about an hour.  He does a " combination of elliptical and treadmill.  He says elliptical makes his feet hurt.  He denies chest pain shortness of breath palpitations lightheadedness swelling or fatigue.  I unfortunately do not have his cholesterol profile.  His LDL needs to be 70 or less.  Because of his weight gain/inability to lose weight I am going to decrease his beta-blocker to see if this could help.  Hypertension patient needs to follow his blood pressure closely since we are decreasing his beta-blocker.  Follow-up 1 year sooner if issues.        MEDICATIONS         Discharge Medications            Accurate as of March 12, 2024  3:11 PM. If you have any questions, ask your nurse or doctor.                Continue These Medications        Instructions Start Date   ALBUTEROL IN   2 puffs, Inhalation, As Needed      atorvastatin 20 MG tablet  Commonly known as: LIPITOR   20 mg, Oral, Nightly      cholecalciferol 25 MCG (1000 UT) tablet   4,000 Units, Oral, Daily      clopidogrel 75 MG tablet  Commonly known as: PLAVIX   75 mg, Oral, Daily      fish oil 1000 MG capsule capsule   1,000 mg, Oral, 2 Times Daily      metoprolol tartrate 25 MG tablet  Commonly known as: LOPRESSOR   TAKE 1 TABLET TWICE A DAY      nitroglycerin 0.4 MG SL tablet  Commonly known as: NITROSTAT   0.4 mg, Sublingual, Every 5 Minutes PRN, Place one tablet under tongue as needed for chest pain.      Vitamin B-12 5000 MCG lozenge   1 tablet, Oral, See Admin Instructions, Every 2 days                   **Apolloon Disclaimer:   Much of this encounter note is an electronic transcription/translation of spoken language to printed text. The electronic translation of spoken language may permit erroneous, or at times, nonsensical words or phrases to be inadvertently transcribed. Although I have reviewed the note for such errors, some may still exist.

## 2024-04-19 ENCOUNTER — TELEPHONE (OUTPATIENT)
Dept: CARDIOLOGY | Facility: CLINIC | Age: 49
End: 2024-04-19
Payer: COMMERCIAL

## 2024-04-19 NOTE — TELEPHONE ENCOUNTER
Pt needs clearance for a bilateral hydrocelectomy on 04/30/24 under general anesthesia.  They would like him to stop his Plavix 7 days prior.  LOV 03/12/24, NOV 03/13/25    Fax to: [phone]  [facility]    Thanks,  Summer

## 2024-06-10 RX ORDER — METOPROLOL SUCCINATE 25 MG/1
25 TABLET, EXTENDED RELEASE ORAL DAILY
Qty: 90 TABLET | Refills: 3 | Status: SHIPPED | OUTPATIENT
Start: 2024-06-10

## 2024-08-02 ENCOUNTER — OFFICE VISIT (OUTPATIENT)
Dept: ORTHOPEDIC SURGERY | Facility: CLINIC | Age: 49
End: 2024-08-02
Payer: COMMERCIAL

## 2024-08-02 DIAGNOSIS — R20.2 PARESTHESIA OF LEFT LOWER EXTREMITY: Primary | ICD-10-CM

## 2024-08-02 DIAGNOSIS — M51.16 LUMBAR DISC DISEASE WITH RADICULOPATHY: ICD-10-CM

## 2024-08-02 PROCEDURE — 99213 OFFICE O/P EST LOW 20 MIN: CPT | Performed by: NURSE PRACTITIONER

## 2024-08-02 RX ORDER — MAGNESIUM OXIDE 400 MG/1
400 TABLET ORAL DAILY
COMMUNITY

## 2024-08-02 NOTE — PROGRESS NOTES
Subjective:     Patient ID: Blake Dennis is a 49 y.o. male.    Chief Complaint:  Follow-up paresthesia left lower extremity   History of Present Illness  History of Present Illness  The patient returns to clinic today for follow-up pain across his low back and also pain along the proximal to mid tib-fib, left lower extremity.    The patient has previously undergone x-ray imaging and an MRI scan of the lumbar spine, both of which yielded no significant abnormalities. He received epidural injections in 12/2023, however, he is uncertain of any symptom improvement post-surgery. He reports experiencing increased pain across the lumbar spine and subsequent pain across the proximal tib-fib. The pain intensifies during ambulation, but is also present during rest. Despite attempts at self-treatment with a steroid taper and anti-inflammatory, there has been no improvement. He denies any other concerns at this time.         Social History     Occupational History    Not on file   Tobacco Use    Smoking status: Never    Smokeless tobacco: Never    Tobacco comments:     caffeine use 1 cup coffee daily   Vaping Use    Vaping status: Never Used   Substance and Sexual Activity    Alcohol use: Not Currently     Alcohol/week: 6.0 standard drinks of alcohol     Types: 6 Shots of liquor per week     Comment: very rarely    Drug use: Never    Sexual activity: Defer      Past Medical History:   Diagnosis Date    Antiplatelet or antithrombotic long-term use     Plavix    BMI 38.0-38.9,adult     CAD (coronary artery disease) 2020    h/o stents x2, Dr Torres follows    Chest pain     Elevated cholesterol     H/O heart artery stent     x2 2020 LAD    Hypertension     Sleep apnea     CPAP     Past Surgical History:   Procedure Laterality Date    CARDIAC CATHETERIZATION N/A 4/27/2020    Procedure: Coronary angiography;  Surgeon: Mj Anne MD;  Location: [facility] INVASIVE LOCATION;  Service: Cardiovascular;  Laterality: N/A;     CARDIAC CATHETERIZATION N/A 4/27/2020    Procedure: Left heart cath;  Surgeon: Mj Anne MD;  Location:  [facility] CATH INVASIVE LOCATION;  Service: Cardiovascular;  Laterality: N/A;    CARDIAC CATHETERIZATION N/A 4/27/2020    Procedure: Left ventriculography;  Surgeon: Mj Anne MD;  Location:  [facility] CATH INVASIVE LOCATION;  Service: Cardiovascular;  Laterality: N/A;    CARDIAC CATHETERIZATION N/A 4/27/2020    Procedure: Stent TANNER coronary;  Surgeon: Mj Anne MD;  Location:  [facility] CATH INVASIVE LOCATION;  Service: Cardiovascular;  Laterality: N/A;    CARDIAC CATHETERIZATION N/A 4/21/2022    Procedure: Left Heart Cath;  Surgeon: Benjamin Hui MD;  Location: [facility] CATH INVASIVE LOCATION;  Service: Cardiovascular;  Laterality: N/A;    CARDIAC CATHETERIZATION N/A 4/21/2022    Procedure: Coronary angiography;  Surgeon: Benjamin uHi MD;  [facility] C[facility]VAS[facility] LOC[facility];  Service: Cardiovascular;  Laterality: N/A;    CARDIAC CATHETERIZATION N/A 4/21/2022    Procedure: Left ventriculography;  Surgeon: Benjamin Hui MD;  Location: [facility] CATH INVASIVE LOCATION;  Service: Cardiovascular;  Laterality: N/A;    INGUINAL HERNIA REPAIR Bilateral 3/9/2023    Procedure: umbilical hernia repair with laparoscopic bilateral inguinal hernia repair;  Surgeon: Rosalie Miller DO;  Location: [facility] OR;  Service: General;  Laterality: Bilateral;    UMBILICAL HERNIA REPAIR N/A 3/9/2023    Procedure: UMBILICAL HERNIA REPAIR;  Surgeon: Rosalie Miller DO;  Location:  [facility] OR;  Service: General;  Laterality: N/A;       Family History   Problem Relation Age of Onset    Hypertension Mother     Cancer Mother     Malig Hyperthermia Neg Hx                Objective:  Physical Exam    Vital signs reviewed.   General: No acute distress.  Eyes: conjunctiva clear; pupils equally round and reactive  ENT: external ears and nose atraumatic; oropharynx clear  CV: no peripheral edema  Resp: normal  respiratory effort  Skin: no rashes or wounds; normal turgor  Psych: mood and affect appropriate; recent and remote memory intact    There were no vitals filed for this visit.  There were no vitals filed for this visit.  There is no height or weight on file to calculate BMI.      Left Knee Exam     Tenderness   The patient is experiencing no tenderness.     Range of Motion   Extension:  0   Left knee flexion: 125.       Back Exam     Tenderness   The patient is experiencing tenderness in the lumbar.    Tests   Straight leg raise left: positive at 70 deg    Comments:  Paresthesia along the proximal tib-fib left lower extremity with straight leg raise             Physical Exam      Imaging:  Left Tib-Fib X-Ray  Indication: Pain  AP and Lateral views    Findings:  No fracture  No bony lesion  Normal soft tissues  Normal joint spaces    prior studies were available for comparison.    Assessment:        1. Paresthesia of left lower extremity    2. Lumbar disc disease with radiculopathy         Assessment & Plan  1. Low back pain.  The patient and I engaged in a comprehensive discussion regarding his plan of care. We explored the possibility of initiating physical therapy for his lumbar spine to ascertain if this would significantly improve his symptoms. Concurrently, a referral to a pain management specialist will be arranged, given the patient's preference for the location. He is encouraged to reach out with any questions or concerns. All his queries were addressed.    Follow-up  A follow-up appointment is scheduled for 6 weeks to reassess the patient's condition.    Plan:    Orders:  Orders Placed This Encounter   Procedures    XR Tibia Fibula 2 View Left    Ambulatory Referral to Physical Therapy    Ambulatory Referral to Pain Management     No orders of the defined types were placed in this encounter.          I ordered and reviewed the LACY today.       Dragon dictation utilized  Class 3 Severe Obesity (BMI >=40).  Obesity-related health conditions include the following: osteoarthritis. Obesity is unchanged. BMI is is above average; BMI management plan is completed. We discussed portion control, increasing exercise, and Information on healthy weight added to patient's after visit summary.       Patient or patient representative verbalized consent for the use of Ambient Listening during the visit with  NIKI Gorman for chart documentation. 8/2/2024  19:35 EDT

## 2024-09-18 ENCOUNTER — OFFICE VISIT (OUTPATIENT)
Dept: ORTHOPEDIC SURGERY | Facility: CLINIC | Age: 49
End: 2024-09-18
Payer: COMMERCIAL

## 2024-09-18 VITALS — HEIGHT: 71 IN | BODY MASS INDEX: 40.12 KG/M2 | WEIGHT: 286.6 LBS

## 2024-09-18 DIAGNOSIS — M62.58 MUSCLE ATROPHY OF LOWER EXTREMITY: ICD-10-CM

## 2024-09-18 DIAGNOSIS — R20.2 PARESTHESIA OF LEFT LOWER EXTREMITY: Primary | ICD-10-CM

## 2024-09-18 PROCEDURE — 99213 OFFICE O/P EST LOW 20 MIN: CPT | Performed by: NURSE PRACTITIONER

## 2024-09-18 RX ORDER — GABAPENTIN 300 MG/1
1 CAPSULE ORAL 3 TIMES DAILY
COMMUNITY
Start: 2024-08-18

## 2024-09-18 RX ORDER — CEFDINIR 300 MG/1
1 CAPSULE ORAL EVERY 12 HOURS SCHEDULED
COMMUNITY
Start: 2024-08-15

## 2024-12-24 ENCOUNTER — TRANSCRIBE ORDERS (OUTPATIENT)
Dept: ADMINISTRATIVE | Facility: HOSPITAL | Age: 49
End: 2024-12-24
Payer: COMMERCIAL

## 2024-12-24 DIAGNOSIS — M54.10 RADICULOPATHY OF LEG: ICD-10-CM

## 2024-12-24 DIAGNOSIS — R20.0 LOWER EXTREMITY NUMBNESS: ICD-10-CM

## 2024-12-24 DIAGNOSIS — M25.552 LEFT HIP PAIN: Primary | ICD-10-CM

## 2025-01-17 ENCOUNTER — HOSPITAL ENCOUNTER (OUTPATIENT)
Dept: MRI IMAGING | Facility: HOSPITAL | Age: 50
Discharge: HOME | End: 2025-01-17
Admitting: NURSE PRACTITIONER
Payer: COMMERCIAL

## 2025-01-17 ENCOUNTER — TELEPHONE (OUTPATIENT)
Dept: ORTHOPEDIC SURGERY | Facility: CLINIC | Age: 50
End: 2025-01-17
Payer: COMMERCIAL

## 2025-01-17 DIAGNOSIS — M25.552 LEFT HIP PAIN: ICD-10-CM

## 2025-01-17 DIAGNOSIS — M54.10 RADICULOPATHY OF LEG: ICD-10-CM

## 2025-01-17 DIAGNOSIS — R20.0 LOWER EXTREMITY NUMBNESS: ICD-10-CM

## 2025-01-17 PROCEDURE — 73721 MRI JNT OF LWR EXTRE W/O DYE: CPT

## 2025-01-17 NOTE — TELEPHONE ENCOUNTER
IF PATIENT CALLS BACK PLEASE LET HIM KNOW THAT OUR OFFICE DOES NOT TREAT BACK PAIN OR TRANSFER CALL TO ME. PATIENT GOT REFERRED OVER TO OUR OFFICE FOR LOWER BACK PAIN FROM 'S OFFICE.

## 2025-02-11 ENCOUNTER — OFFICE VISIT (OUTPATIENT)
Dept: ORTHOPEDIC SURGERY | Facility: CLINIC | Age: 50
End: 2025-02-11
Payer: COMMERCIAL

## 2025-02-11 VITALS
HEIGHT: 71 IN | SYSTOLIC BLOOD PRESSURE: 110 MMHG | WEIGHT: 270 LBS | HEART RATE: 64 BPM | BODY MASS INDEX: 37.8 KG/M2 | DIASTOLIC BLOOD PRESSURE: 71 MMHG

## 2025-02-11 DIAGNOSIS — M79.605 LEFT LEG PAIN: Primary | ICD-10-CM

## 2025-02-11 RX ORDER — MELOXICAM 15 MG/1
1 TABLET ORAL DAILY
COMMUNITY
Start: 2024-12-04

## 2025-02-11 NOTE — PROGRESS NOTES
Subjective:     Patient ID: Blake Dennis is a 49 y.o. male.    Chief Complaint:    History of Present Illness  Blake Dennis returns to clinic today for evaluation of left lower lateral leg pain.  She has seen Taryn Olmedo in the past for this and has had issues with unrelenting symptoms.  He has tried MRI of his lower extremity physical therapy injections and back MRI and back injections he recently had an MRI of his hip and is here today to go over those results and see if this may be contributing to his left lateral lower leg pain.  He denies anterior groin pain lateral hip pain or posterior buttock pain.     Social History     Occupational History    Not on file   Tobacco Use    Smoking status: Never     Passive exposure: Never    Smokeless tobacco: Never    Tobacco comments:     caffeine use 1 cup coffee daily   Vaping Use    Vaping status: Never Used   Substance and Sexual Activity    Alcohol use: Not Currently     Alcohol/week: 6.0 standard drinks of alcohol     Types: 6 Shots of liquor per week     Comment: very rarely    Drug use: Never    Sexual activity: Defer      Past Medical History:   Diagnosis Date    Antiplatelet or antithrombotic long-term use     Plavix    BMI 38.0-38.9,adult     CAD (coronary artery disease) 2020    h/o stents x2, Dr Torres follows    Chest pain     Elevated cholesterol     H/O heart artery stent     x2 2020 LAD    Hypertension     Sleep apnea     CPAP     Past Surgical History:   Procedure Laterality Date    CARDIAC CATHETERIZATION N/A 4/27/2020    Procedure: Coronary angiography;  Surgeon: Mj Anne MD;  Location: [facility] INVASIVE LOCATION;  Service: Cardiovascular;  Laterality: N/A;    CARDIAC CATHETERIZATION N/A 4/27/2020    Procedure: Left heart cath;  Surgeon: Mj Anne MD;  Location: [facility] INVASIVE LOCATION;  Service: Cardiovascular;  Laterality: N/A;    CARDIAC CATHETERIZATION N/A 4/27/2020    Procedure: Left ventriculography;  Surgeon:  "Mj Anne MD;  Location:  [facility] CATH INVASIVE LOCATION;  Service: Cardiovascular;  Laterality: N/A;    CARDIAC CATHETERIZATION N/A 4/27/2020    Procedure: Stent TANNER coronary;  Surgeon: Mj Anne MD;  Location:  [facility] CATH INVASIVE LOCATION;  Service: Cardiovascular;  Laterality: N/A;    CARDIAC CATHETERIZATION N/A 4/21/2022    Procedure: Left Heart Cath;  Surgeon: Benjamin Hui MD;  Location: [facility] CATH INVASIVE LOCATION;  Service: Cardiovascular;  Laterality: N/A;    CARDIAC CATHETERIZATION N/A 4/21/2022    Procedure: Coronary angiography;  Surgeon: Benjamin Hui MD;  Location:  [facility] CATH INVASIVE LOCATION;  Service: Cardiovascular;  Laterality: N/A;    CARDIAC CATHETERIZATION N/A 4/21/2022    Procedure: Left ventriculography;  Surgeon: Benjamin Hui MD;  Location: [facility] CATH INVASIVE LOCATION;  Service: Cardiovascular;  Laterality: N/A;    INGUINAL HERNIA REPAIR Bilateral 3/9/2023    Procedure: umbilical hernia repair with laparoscopic bilateral inguinal hernia repair;  Surgeon: Rosalie Miller DO;  Location: [facility] OR;  Service: General;  Laterality: Bilateral;    UMBILICAL HERNIA REPAIR N/A 3/9/2023    Procedure: UMBILICAL HERNIA REPAIR;  Surgeon: Rosalie Miller DO;  Location:  [facility] OR;  Service: General;  Laterality: N/A;       Family History   Problem Relation Age of Onset    Hypertension Mother     Cancer Mother     Malig Hyperthermia Neg Hx                  Objective:  Vitals:    02/11/25 0830   BP: 110/71   Pulse: 64   Weight: 122 kg (270 lb)   Height: 180.3 cm (71\")         02/11/25  0830   Weight: 122 kg (270 lb)     Body mass index is 37.66 kg/m².           Left Hip Exam     Tenderness   The patient is experiencing no tenderness.     Range of Motion   Flexion:  normal   External rotation:  normal   Internal rotation: normal     Muscle Strength   Flexion: 5/5     Tests   TRESA: negative  Fadir:  Negative FADIR test    Other   Erythema: absent  Scars: " absent  Sensation: normal  Pulse: present               Imaging:   MRI Hip Left Without Contrast    Result Date: 1/17/2025  1.No evidence for fracture or dislocation. No evidence for stress injury or stress reaction. No evidence for osseous necrosis. 2.Moderate osteoarthritic degenerative changes of the bilateral hips. 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 No displaced labral fragment or flap is seen. 4.Evidence for chronic tendinopathy involving the myotendinous attachments associated with the hips and pelvis bilaterally. There is no evidence for myotendinous avulsion. Electronically Signed: Alex Díaz MD  1/17/2025 3:53 PM EST  Workstation ID: [id number]    Imaging: 3 views of the left hip and pelvis were ordered and reviewed by myself in the office today  Indication: left hip pain  Findings: X-rays demonstrate no acute osseous abnormality and no signs of fracture dislocation or subluxation.  X-rays demonstrate no significant joint space narrowing, femoral head flattening, subchondral sclerosis, cystic changes, or periarticular osteophytes.  Comparative studies: None      Assessment:        1. Left leg pain           Plan:          Discussed treatment options at length with patient at today's visit.  I discussed with the patient that he does have some mild hip arthritis visible on MRI that is not apparent on plain radiographs.  I did discuss however that I do not think this is contributing to his pain in his left lateral lower leg.  The patient thinks that it may be blood flow related and he has had multiple stents placed in his heart already at a young age for stenosis.  The patient is set up to see his cardiologist coming up and would like them to weigh in on his leg pain and whether they think it may be blood flow related before seeking further treatment for his hip.  The patient denies any anterior groin pain or lateral hip pain.  I discussed with him that we could try a  intra-articular hip injection under fluoroscopy at the hospital to rule out the hip as a cause of any of his symptoms but he would like to hold off for now.  I discussed with them that if the pain worsens or starts to develop anterior groin pain or lateral hip pain to please let us know.  Follow up: As needed with Taryn Dennis was in agreement with plan and had all questions answered.     Medications:  No orders of the defined types were placed in this encounter.      Followup:  No follow-ups on file.    Diagnoses and all orders for this visit:    1. Left leg pain (Primary)  -     XR Hip With or Without Pelvis 2 - 3 View Left          Dictated utilizing Dragon dictation

## 2025-03-13 ENCOUNTER — OFFICE VISIT (OUTPATIENT)
Dept: CARDIOLOGY | Facility: CLINIC | Age: 50
End: 2025-03-13
Payer: COMMERCIAL

## 2025-03-13 VITALS
HEART RATE: 60 BPM | HEIGHT: 71 IN | DIASTOLIC BLOOD PRESSURE: 88 MMHG | SYSTOLIC BLOOD PRESSURE: 124 MMHG | BODY MASS INDEX: 37.38 KG/M2 | WEIGHT: 267 LBS

## 2025-03-13 DIAGNOSIS — I25.10 CORONARY ARTERY DISEASE INVOLVING NATIVE CORONARY ARTERY OF NATIVE HEART WITHOUT ANGINA PECTORIS: Primary | ICD-10-CM

## 2025-03-13 PROCEDURE — 99214 OFFICE O/P EST MOD 30 MIN: CPT | Performed by: INTERNAL MEDICINE

## 2025-03-13 PROCEDURE — 93000 ELECTROCARDIOGRAM COMPLETE: CPT | Performed by: INTERNAL MEDICINE

## 2025-03-13 NOTE — PROGRESS NOTES
"      CARDIOLOGY    Bo Torres MD    ENCOUNTER DATE:  03/13/2025    Blake Dennis / 50 y.o. / male        CHIEF COMPLAINT / REASON FOR OFFICE VISIT     Coronary Artery Disease (03/12/2024 Follow up)      HISTORY OF PRESENT ILLNESS       Coronary Artery Disease      Blake Dennis is a 50 y.o. male who presents today for reevaluation.  Clinically he is doing great he has been exercising more.  He has lost about 30 pounds.  He is exercising doing about 15 to 30 minutes but does not like for quality exercise.  Encouraged to do better cardiovascular exercise.      The following portions of the patient's history were reviewed and updated as appropriate: allergies, current medications, past family history, past medical history, past social history, past surgical history and problem list.      VITAL SIGNS     Visit Vitals  /88 (BP Location: Left arm)   Pulse 60   Ht 180.3 cm (71\")   Wt 121 kg (267 lb)   BMI 37.24 kg/m²         Wt Readings from Last 3 Encounters:   03/13/25 121 kg (267 lb)   02/11/25 122 kg (270 lb)   01/17/25 130 kg (286 lb)     Body mass index is 37.24 kg/m².      REVIEW OF SYSTEMS   ROS        PHYSICAL EXAMINATION     Vitals reviewed.   Constitutional:       Appearance: Healthy appearance.   Pulmonary:      Effort: Pulmonary effort is normal.   Cardiovascular:      Normal rate. Regular rhythm. Normal S1. Normal S2.       Murmurs: There is no murmur.      No gallop.  No click. No rub.   Pulses:     Intact distal pulses.   Edema:     Peripheral edema absent.   Neurological:      Mental Status: Alert.           REVIEWED DATA       ECG 12 Lead    Date/Time: 3/13/2025 3:04 PM  Performed by: Bo Torres MD    Authorized by: Bo Torres MD  Comparison: compared with previous ECG from 5/8/2023  Similar to previous ECG  Rhythm: sinus rhythm    Clinical impression: normal ECG          Cardiac Procedures:            ASSESSMENT & PLAN      Diagnosis Plan   1. Coronary artery " disease involving native coronary artery of native heart without angina pectoris              SUMMARY/DISCUSSION  Coronary artery disease.  Overall patient is actually doing quite well.  He is lost about 30 pounds he is working out again.  He does do cardio but I am concerned about the quality of cardio did tell him to step up his pace a little bit.  His total cholesterol is 132 his LDL is 73 his HDL is 31.  Overall he is doing much better he looks good this year.  Diastolic pressure little bit elevated we will follow this in the future.  Overall he is doing great see him back in 1 year or sooner if issues.        MEDICATIONS         Discharge Medications            Accurate as of March 13, 2025  2:56 PM. If you have any questions, ask your nurse or doctor.                Continue These Medications        Instructions Start Date   ALBUTEROL IN   2 puffs, As Needed      atorvastatin 20 MG tablet  Commonly known as: LIPITOR   20 mg, Nightly      cefdinir 300 MG capsule  Commonly known as: OMNICEF   1 capsule, Every 12 Hours Scheduled      cholecalciferol 25 MCG (1000 UT) tablet  Commonly known as: VITAMIN D3   4,000 Units, Daily      clopidogrel 75 MG tablet  Commonly known as: PLAVIX   75 mg, Oral, Daily      fish oil 1000 MG capsule capsule   1,000 mg, 2 Times Daily      gabapentin 300 MG capsule  Commonly known as: NEURONTIN   1 capsule, 3 times daily      magnesium oxide 400 MG tablet  Commonly known as: MAG-OX   400 mg, Daily      meloxicam 15 MG tablet  Commonly known as: MOBIC   1 tablet, Daily      metoprolol succinate XL 25 MG 24 hr tablet  Commonly known as: TOPROL-XL   25 mg, Oral, Daily      nitroglycerin 0.4 MG SL tablet  Commonly known as: NITROSTAT   0.4 mg, Sublingual, Every 5 Minutes PRN, Place one tablet under tongue as needed for chest pain.      Tirzepatide 5 MG/0.5ML solution auto-injector   Inject 0.5mL (5mg=50 units) subcutaneously once weekly for four (4) weeks.      Vitamin B-12 5000 MCG  lozenge   1 tablet, See Admin Instructions                   **Dragon Disclaimer:   Much of this encounter note is an electronic transcription/translation of spoken language to printed text. The electronic translation of spoken language may permit erroneous, or at times, nonsensical words or phrases to be inadvertently transcribed. Although I have reviewed the note for such errors, some may still exist.

## 2025-03-17 RX ORDER — CLOPIDOGREL BISULFATE 75 MG/1
75 TABLET ORAL DAILY
Qty: 90 TABLET | Refills: 1 | Status: SHIPPED | OUTPATIENT
Start: 2025-03-17

## 2025-07-29 ENCOUNTER — OFFICE VISIT (OUTPATIENT)
Dept: ORTHOPEDIC SURGERY | Facility: CLINIC | Age: 50
End: 2025-07-29
Payer: COMMERCIAL

## 2025-07-29 VITALS — BODY MASS INDEX: 37.38 KG/M2 | HEIGHT: 71 IN | WEIGHT: 267 LBS

## 2025-07-29 DIAGNOSIS — M16.12 PRIMARY OSTEOARTHRITIS OF LEFT HIP: Primary | ICD-10-CM

## 2025-07-29 DIAGNOSIS — M79.672 LEFT FOOT PAIN: ICD-10-CM

## 2025-07-29 DIAGNOSIS — M62.58 MUSCLE ATROPHY OF LOWER EXTREMITY: ICD-10-CM

## 2025-07-29 DIAGNOSIS — R20.2 PARESTHESIA OF LEFT LOWER EXTREMITY: ICD-10-CM

## 2025-07-29 RX ORDER — EZETIMIBE 10 MG/1
10 TABLET ORAL DAILY
COMMUNITY
Start: 2025-07-09 | End: 2026-07-09

## 2025-07-29 NOTE — PROGRESS NOTES
Subjective:     Patient ID: Blake Dennis is a 50 y.o. male.    Chief Complaint:         History of Present Illness  The patient presents for evaluation of leg pain.    He has been experiencing persistent pain in his leg for several years, which has now extended to his foot. He was last seen in 02/2025. He was referred by his family doctor for an MRI of the hip, suspecting a labral tear as the cause of the pain. The MRI revealed a possible tear. He occasionally experiences groin pain. He also reports back issues and occasional numbness. He has undergone injections in his back without any relief. An MRI of his lower leg was performed, but no abnormalities were found. He has also had an EMG test, which did not reveal any issues. He has consulted a neurologist.     He experiences numbness in his left foot when using the elliptical machine. He has tried various treatments, including injections, but none have provided relief. He recalls a previous consultation with Taryn, during which x-rays of his legs and foot were taken, revealing no abnormalities. He underwent a scan of his leg to check for poor circulation and a nerve test, both of which were normal. Currently, he feels pain at the bottom of his foot and toes. He noticed that prolonged sitting, such as during a drive to [facility], exacerbates the pain in his leg and foot. The pain intensifies when he is at home in the evenings, particularly when he sits in his recliner with his feet elevated. However, the pain does not seem to bother him while he is at work.    SOCIAL HISTORY  Exercise: Uses an elliptical machine; experiences numbness in the left foot during exercise.    Social History     Occupational History    Not on file   Tobacco Use    Smoking status: Never     Passive exposure: Never    Smokeless tobacco: Never    Tobacco comments:     caffeine use 1 cup coffee daily   Vaping Use    Vaping status: Never Used   Substance and Sexual Activity    Alcohol  use: Not Currently     Alcohol/week: 6.0 standard drinks of alcohol     Types: 6 Shots of liquor per week     Comment: very rarely    Drug use: Never    Sexual activity: Defer      Past Medical History:   Diagnosis Date    Antiplatelet or antithrombotic long-term use     Plavix    BMI 38.0-38.9,adult     CAD (coronary artery disease) 2020    h/o stents x2, Dr Torres follows    Chest pain     Elevated cholesterol     H/O heart artery stent     x2 2020 LAD    Hypertension     Sleep apnea 1/15/2022    CPAP     Past Surgical History:   Procedure Laterality Date    CARDIAC CATHETERIZATION N/A 4/27/2020    Procedure: Coronary angiography;  Surgeon: Mj Anne MD;  Location: [facility] CATH INVASIVE LOCATION;  Service: Cardiovascular;  Laterality: N/A;    CARDIAC CATHETERIZATION N/A 4/27/2020    Procedure: Left heart cath;  Surgeon: Mj Anne MD;  Location: [facility] CATH INVASIVE LOCATION;  Service: Cardiovascular;  Laterality: N/A;    CARDIAC CATHETERIZATION N/A 4/27/2020    Procedure: Left ventriculography;  Surgeon: Mj Anne MD;  Location: [facility] CATH INVASIVE LOCATION;  Service: Cardiovascular;  Laterality: N/A;    CARDIAC CATHETERIZATION N/A 4/27/2020    Procedure: Stent TANNER coronary;  Surgeon: Mj Anne MD;  Location: [facility] CATH INVASIVE LOCATION;  Service: Cardiovascular;  Laterality: N/A;    CARDIAC CATHETERIZATION N/A 4/21/2022    Procedure: Left Heart Cath;  Surgeon: Benjamin Hui MD;  Location: [facility] CATH INVASIVE LOCATION;  Service: Cardiovascular;  Laterality: N/A;    CARDIAC CATHETERIZATION N/A 4/21/2022    Procedure: Coronary angiography;  Surgeon: Benjamin Hui MD;  Location: [facility] CATH INVASIVE LOCATION;  Service: Cardiovascular;  Laterality: N/A;    CARDIAC CATHETERIZATION N/A 4/21/2022    Procedure: Left ventriculography;  Surgeon: Benjamin Hui MD;  Location: [facility] CATH INVASIVE LOCATION;  Service: Cardiovascular;  Laterality: N/A;    INGUINAL HERNIA REPAIR  "Bilateral 3/9/2023    Procedure: umbilical hernia repair with laparoscopic bilateral inguinal hernia repair;  Surgeon: Rosalie Miller DO;  Location: [facility] OR;  Service: General;  Laterality: Bilateral;    UMBILICAL HERNIA REPAIR N/A 3/9/2023    Procedure: UMBILICAL HERNIA REPAIR;  Surgeon: Rosalie Miller DO;  Location: [facility] OR;  Service: General;  Laterality: N/A;       Family History   Problem Relation Age of Onset    Hypertension Mother     Cancer Mother     Malig Hyperthermia Neg Hx                  Objective:  Vitals:    07/29/25 1430   Weight: 121 kg (267 lb)   Height: 180.3 cm (71\")         07/29/25  1430   Weight: 121 kg (267 lb)     Body mass index is 37.24 kg/m².           Left Ankle Exam     Comments:  Decree sensation on the bottom of his foot with toe pain      Left Hip Exam     Tenderness   The patient is experiencing tenderness in the anterior.    Range of Motion   Flexion:  normal   External rotation:  normal   Internal rotation: normal     Muscle Strength   Flexion: 5/5     Tests   TRESA: negative  Fadir:  Negative FADIR test    Other   Erythema: absent  Scars: absent  Sensation: normal  Pulse: present                   Assessment:        1. Primary osteoarthritis of left hip    2. Paresthesia of left lower extremity    3. Muscle atrophy of lower extremity    4. Left foot pain             Assessment & Plan  1. Leg pain:  The leg pain is likely more related to back issues than hip problems. The possibility of exertional compartment syndrome was considered but deemed unlikely due to the worsening of symptoms during periods of inactivity. Despite previous unsuccessful back injections, a hip injection with steroid and numbing medication will be administered to assess its impact on the leg pain. If the injection alleviates symptoms, it may indicate the pain originates from the hip joint, potentially leading to further hip treatments such as continued injections or hip replacement. A " referral to podiatrist Dr. Mark Holm has been made for further evaluation of the foot pain and numbness, as his expertise may provide additional insights.       Blake Dennis was in agreement with plan and had all questions answered.     Medications:  No orders of the defined types were placed in this encounter.      Followup:  No follow-ups on file.    Diagnoses and all orders for this visit:    1. Primary osteoarthritis of left hip (Primary)  -     FL Guide For Pain Meds Injection Joint; Future    2. Paresthesia of left lower extremity  -     Ambulatory Referral to Podiatry    3. Muscle atrophy of lower extremity  -     Ambulatory Referral to Podiatry    4. Left foot pain  -     Ambulatory Referral to Podiatry          Dictated utilizing Dragon dictation     Patient or patient representative verbalized consent for the use of Ambient Listening during the visit with  Sathya Swift MD for chart documentation. 7/29/2025  14:58 EDT

## 2025-07-31 RX ORDER — METOPROLOL SUCCINATE 25 MG/1
25 TABLET, EXTENDED RELEASE ORAL DAILY
Qty: 90 TABLET | Refills: 1 | Status: SHIPPED | OUTPATIENT
Start: 2025-07-31

## 2025-08-06 RX ORDER — CLOPIDOGREL BISULFATE 75 MG/1
75 TABLET ORAL DAILY
Qty: 90 TABLET | Refills: 3 | Status: SHIPPED | OUTPATIENT
Start: 2025-08-06

## 2025-08-07 ENCOUNTER — OFFICE VISIT (OUTPATIENT)
Age: 50
End: 2025-08-07
Payer: COMMERCIAL

## 2025-08-07 VITALS — RESPIRATION RATE: 20 BRPM | WEIGHT: 267 LBS | HEIGHT: 71 IN | BODY MASS INDEX: 37.38 KG/M2

## 2025-08-07 DIAGNOSIS — M79.672 LEFT FOOT PAIN: Primary | ICD-10-CM

## 2025-08-07 DIAGNOSIS — M54.32 SCIATICA OF LEFT SIDE: ICD-10-CM

## 2025-08-07 DIAGNOSIS — G57.92 NEURITIS OF LEFT LOWER EXTREMITY: ICD-10-CM

## 2025-08-07 RX ORDER — CETIRIZINE HYDROCHLORIDE 10 MG/1
CAPSULE, LIQUID FILLED ORAL
COMMUNITY

## 2025-08-20 ENCOUNTER — HOSPITAL ENCOUNTER (OUTPATIENT)
Dept: GENERAL RADIOLOGY | Facility: HOSPITAL | Age: 50
Discharge: HOME | End: 2025-08-20
Admitting: INTERNAL MEDICINE
Payer: COMMERCIAL

## 2025-08-20 DIAGNOSIS — M16.12 PRIMARY OSTEOARTHRITIS OF LEFT HIP: ICD-10-CM

## 2025-08-20 PROCEDURE — 25010000002 LIDOCAINE 1 % SOLUTION: Performed by: INTERNAL MEDICINE

## 2025-08-20 PROCEDURE — 77002 NEEDLE LOCALIZATION BY XRAY: CPT

## 2025-08-20 PROCEDURE — 25010000002 METHYLPREDNISOLONE PER 125 MG: Performed by: INTERNAL MEDICINE

## 2025-08-20 PROCEDURE — 25010000002 BUPIVACAINE (PF) 0.25 % SOLUTION: Performed by: INTERNAL MEDICINE

## 2025-08-20 PROCEDURE — 25510000001 IOPAMIDOL 61 % SOLUTION: Performed by: INTERNAL MEDICINE

## 2025-08-20 RX ORDER — METHYLPREDNISOLONE SODIUM SUCCINATE 125 MG/2ML
80 INJECTION, POWDER, LYOPHILIZED, FOR SOLUTION INTRAMUSCULAR; INTRAVENOUS
Status: COMPLETED | OUTPATIENT
Start: 2025-08-20 | End: 2025-08-20

## 2025-08-20 RX ORDER — IOPAMIDOL 612 MG/ML
30 INJECTION, SOLUTION INTRAVASCULAR
Status: COMPLETED | OUTPATIENT
Start: 2025-08-20 | End: 2025-08-20

## 2025-08-20 RX ORDER — LIDOCAINE HYDROCHLORIDE 10 MG/ML
10 INJECTION, SOLUTION INFILTRATION; PERINEURAL ONCE
Status: COMPLETED | OUTPATIENT
Start: 2025-08-20 | End: 2025-08-20

## 2025-08-20 RX ORDER — BUPIVACAINE HYDROCHLORIDE 2.5 MG/ML
10 INJECTION, SOLUTION EPIDURAL; INFILTRATION; INTRACAUDAL; PERINEURAL ONCE
Status: COMPLETED | OUTPATIENT
Start: 2025-08-20 | End: 2025-08-20

## 2025-08-20 RX ADMIN — METHYLPREDNISOLONE SODIUM SUCCINATE 80 MG: 125 INJECTION, POWDER, FOR SOLUTION INTRAMUSCULAR; INTRAVENOUS at 07:36

## 2025-08-20 RX ADMIN — BUPIVACAINE HYDROCHLORIDE 3 ML: 2.5 INJECTION, SOLUTION EPIDURAL; INFILTRATION; INTRACAUDAL; PERINEURAL at 07:36

## 2025-08-20 RX ADMIN — IOPAMIDOL 0.5 ML: 612 INJECTION, SOLUTION INTRAVENOUS at 07:36

## 2025-08-20 RX ADMIN — LIDOCAINE HYDROCHLORIDE 2 ML: 10 INJECTION, SOLUTION INFILTRATION; PERINEURAL at 07:36

## (undated) DEVICE — TROCARS: Brand: KII® BALLOON BLUNT TIP SYSTEM

## (undated) DEVICE — ENDOPATH XCEL UNIVERSAL TROCAR STABLILITY SLEEVES: Brand: ENDOPATH XCEL

## (undated) DEVICE — SUT NUROLON 0 CT1 CR8 18IN C521D

## (undated) DEVICE — DRSNG TELFA PAD NONADH STR 1S 3X8IN

## (undated) DEVICE — PK CATH CARD 40

## (undated) DEVICE — UNDYED BRAIDED (POLYGLACTIN 910), SYNTHETIC ABSORBABLE SUTURE: Brand: COATED VICRYL

## (undated) DEVICE — SUT VIC 3/0 CTI 36IN J944H

## (undated) DEVICE — SOL IRR H2O BTL 1000ML STRL

## (undated) DEVICE — KT MANIFLD CARDIAC

## (undated) DEVICE — LAPAROSCOPIC SCOPE WARMER: Brand: DEROYAL

## (undated) DEVICE — Device: Brand: PROWATER

## (undated) DEVICE — GW EMR FIX EXCHG J STD .035 3MM 260CM

## (undated) DEVICE — LAPAROSCOPIC SMOKE FILTRATION SYSTEM: Brand: PALL LAPAROSHIELD® PLUS LAPAROSCOPIC SMOKE FILTRATION SYSTEM

## (undated) DEVICE — GLIDESHEATH SLENDER STAINLESS STEEL KIT: Brand: GLIDESHEATH SLENDER

## (undated) DEVICE — DRSNG SURESITE WNDW 4X4.5

## (undated) DEVICE — CATH DIAG CARD PERFORMA JL3.5 BT 4F100CM

## (undated) DEVICE — APPL HEMO ENDO SURGICEL 2IN1 1P/U STRL

## (undated) DEVICE — ENDOPATH XCEL BLADELESS TROCARS WITH STABILITY SLEEVES: Brand: ENDOPATH XCEL

## (undated) DEVICE — SUT VIC 0/0 UR6 27IN DYED J603H

## (undated) DEVICE — SUT NUROLON 3-0 SH-1 C503D

## (undated) DEVICE — CATH4F INF PIG 145Â° MOD 110CM: Brand: INFINITI

## (undated) DEVICE — GUIDELINER CATHETERS ARE INTENDED TO BE USED IN CONJUNCTION WITH GUIDE CATHETERS TO ACCESS DISCRETE REGIONS OF THE CORONARY AND/OR PERIPHERAL VASCULATURE, AND TO FACILITATE PLACEMENT OF INTERVENTIONAL DEVICES.: Brand: GUIDELINER® V3 CATHETER

## (undated) DEVICE — RADIFOCUS OPTITORQUE ANGIOGRAPHIC CATHETER: Brand: OPTITORQUE

## (undated) DEVICE — TBG PENCL TELESCP MEGADYNE SMOKE EVAC 10FT

## (undated) DEVICE — TREK CORONARY DILATATION CATHETER 2.50 MM X 15 MM / RAPID-EXCHANGE: Brand: TREK

## (undated) DEVICE — LAPAROVUE VISIBILITY SYSTEM LAPAROSCOPIC SOLUTIONS: Brand: LAPAROVUE

## (undated) DEVICE — PATIENT RETURN ELECTRODE, SINGLE-USE, CONTACT QUALITY MONITORING, ADULT, WITH 9FT CORD, FOR PATIENTS WEIGING OVER 33LBS. (15KG): Brand: MEGADYNE

## (undated) DEVICE — CATH VENT MIV RADL PIG ST TIP 5F 110CM

## (undated) DEVICE — TBG INSUFL W FLTR STRL

## (undated) DEVICE — DEV INDEFLATOR

## (undated) DEVICE — SAFELINER SUCTION CANISTER 1000CC: Brand: DEROYAL

## (undated) DEVICE — ENDOPATH PNEUMONEEDLE INSUFFLATION NEEDLES WITH LUER LOCK CONNECTORS 120MM: Brand: ENDOPATH

## (undated) DEVICE — TRAP FLD MINIVAC MEGADYNE 100ML

## (undated) DEVICE — APPL CHLORAPREP HI/LITE 26ML ORNG

## (undated) DEVICE — DRSNG SURESITE WNDW 2.38X2.75

## (undated) DEVICE — CATH GUIDE ZUMA2 EBU 6F 3.5X100CM

## (undated) DEVICE — TOTAL TRAY, 16FR 10ML SIL FOLEY, URN: Brand: MEDLINE

## (undated) DEVICE — METZENBAUM SCISSOR TIP, DISPOSABLE: Brand: RENEW

## (undated) DEVICE — GLV SURG SENSICARE W/ALOE PF LF 6.5 STRL

## (undated) DEVICE — PK LAP GEN 90

## (undated) DEVICE — CATH DIAG CARD PERFORM JR4.0 BT 4F100CM

## (undated) DEVICE — CATH DIAG CARD PERFORMA MPA1 BT 4F 100CM

## (undated) DEVICE — SPNG GZ 2S 2X2 8PLY STRL PK/2

## (undated) DEVICE — DECANTER: Brand: UNBRANDED

## (undated) DEVICE — SUT MNCRYL 2/0 SH 27IN Y417H

## (undated) DEVICE — GLIDESHEATH BASIC HYDROPHILIC COATED INTRODUCER SHEATH: Brand: GLIDESHEATH

## (undated) DEVICE — ADHS SKIN PREMIERPRO EXOFIN TOPICAL HI/VISC .5ML